# Patient Record
Sex: MALE | Race: WHITE | NOT HISPANIC OR LATINO
[De-identification: names, ages, dates, MRNs, and addresses within clinical notes are randomized per-mention and may not be internally consistent; named-entity substitution may affect disease eponyms.]

---

## 2018-02-21 ENCOUNTER — RESULT REVIEW (OUTPATIENT)
Age: 58
End: 2018-02-21

## 2018-10-16 ENCOUNTER — INPATIENT (INPATIENT)
Facility: HOSPITAL | Age: 58
LOS: 1 days | Discharge: ANOTHER HOSPITAL | End: 2018-10-18
Attending: INTERNAL MEDICINE | Admitting: INTERNAL MEDICINE
Payer: COMMERCIAL

## 2018-10-16 VITALS
HEART RATE: 114 BPM | RESPIRATION RATE: 16 BRPM | SYSTOLIC BLOOD PRESSURE: 153 MMHG | TEMPERATURE: 99 F | OXYGEN SATURATION: 100 % | DIASTOLIC BLOOD PRESSURE: 92 MMHG

## 2018-10-16 DIAGNOSIS — K42.9 UMBILICAL HERNIA WITHOUT OBSTRUCTION OR GANGRENE: Chronic | ICD-10-CM

## 2018-10-16 LAB
ALBUMIN SERPL ELPH-MCNC: 4.2 G/DL — SIGNIFICANT CHANGE UP (ref 3.3–5)
ALP SERPL-CCNC: 116 U/L — SIGNIFICANT CHANGE UP (ref 40–120)
ALT FLD-CCNC: 56 U/L — HIGH (ref 4–41)
APTT BLD: 33.4 SEC — SIGNIFICANT CHANGE UP (ref 27.5–37.4)
AST SERPL-CCNC: 39 U/L — SIGNIFICANT CHANGE UP (ref 4–40)
BASE EXCESS BLDV CALC-SCNC: -2.7 MMOL/L — SIGNIFICANT CHANGE UP
BASOPHILS # BLD AUTO: 0.03 K/UL — SIGNIFICANT CHANGE UP (ref 0–0.2)
BASOPHILS NFR BLD AUTO: 0.5 % — SIGNIFICANT CHANGE UP (ref 0–2)
BILIRUB SERPL-MCNC: 0.4 MG/DL — SIGNIFICANT CHANGE UP (ref 0.2–1.2)
BLOOD GAS VENOUS - CREATININE: 0.76 MG/DL — SIGNIFICANT CHANGE UP (ref 0.5–1.3)
BUN SERPL-MCNC: 19 MG/DL — SIGNIFICANT CHANGE UP (ref 7–23)
CALCIUM SERPL-MCNC: 9 MG/DL — SIGNIFICANT CHANGE UP (ref 8.4–10.5)
CHLORIDE BLDV-SCNC: 108 MMOL/L — SIGNIFICANT CHANGE UP (ref 96–108)
CHLORIDE SERPL-SCNC: 97 MMOL/L — LOW (ref 98–107)
CO2 SERPL-SCNC: 18 MMOL/L — LOW (ref 22–31)
CREAT SERPL-MCNC: 0.91 MG/DL — SIGNIFICANT CHANGE UP (ref 0.5–1.3)
EOSINOPHIL # BLD AUTO: 0.11 K/UL — SIGNIFICANT CHANGE UP (ref 0–0.5)
EOSINOPHIL NFR BLD AUTO: 1.9 % — SIGNIFICANT CHANGE UP (ref 0–6)
GAS PNL BLDV: 130 MMOL/L — LOW (ref 136–146)
GLUCOSE BLDV-MCNC: 312 — HIGH (ref 70–99)
GLUCOSE SERPL-MCNC: 329 MG/DL — HIGH (ref 70–99)
HBA1C BLD-MCNC: 9 % — HIGH (ref 4–5.6)
HCO3 BLDV-SCNC: 23 MMOL/L — SIGNIFICANT CHANGE UP (ref 20–27)
HCT VFR BLD CALC: 42.3 % — SIGNIFICANT CHANGE UP (ref 39–50)
HCT VFR BLDV CALC: 45.6 % — SIGNIFICANT CHANGE UP (ref 39–51)
HGB BLD-MCNC: 14.4 G/DL — SIGNIFICANT CHANGE UP (ref 13–17)
HGB BLDV-MCNC: 14.9 G/DL — SIGNIFICANT CHANGE UP (ref 13–17)
IMM GRANULOCYTES # BLD AUTO: 0.02 # — SIGNIFICANT CHANGE UP
IMM GRANULOCYTES NFR BLD AUTO: 0.3 % — SIGNIFICANT CHANGE UP (ref 0–1.5)
INR BLD: 0.95 — SIGNIFICANT CHANGE UP (ref 0.88–1.17)
LACTATE BLDV-MCNC: 1.8 MMOL/L — SIGNIFICANT CHANGE UP (ref 0.5–2)
LYMPHOCYTES # BLD AUTO: 1.18 K/UL — SIGNIFICANT CHANGE UP (ref 1–3.3)
LYMPHOCYTES # BLD AUTO: 20.5 % — SIGNIFICANT CHANGE UP (ref 13–44)
MCHC RBC-ENTMCNC: 30.4 PG — SIGNIFICANT CHANGE UP (ref 27–34)
MCHC RBC-ENTMCNC: 34 % — SIGNIFICANT CHANGE UP (ref 32–36)
MCV RBC AUTO: 89.2 FL — SIGNIFICANT CHANGE UP (ref 80–100)
MONOCYTES # BLD AUTO: 0.78 K/UL — SIGNIFICANT CHANGE UP (ref 0–0.9)
MONOCYTES NFR BLD AUTO: 13.6 % — SIGNIFICANT CHANGE UP (ref 2–14)
NEUTROPHILS # BLD AUTO: 3.63 K/UL — SIGNIFICANT CHANGE UP (ref 1.8–7.4)
NEUTROPHILS NFR BLD AUTO: 63.2 % — SIGNIFICANT CHANGE UP (ref 43–77)
NRBC # FLD: 0 — SIGNIFICANT CHANGE UP
PCO2 BLDV: 33 MMHG — LOW (ref 41–51)
PH BLDV: 7.42 PH — SIGNIFICANT CHANGE UP (ref 7.32–7.43)
PLATELET # BLD AUTO: 255 K/UL — SIGNIFICANT CHANGE UP (ref 150–400)
PMV BLD: 9.9 FL — SIGNIFICANT CHANGE UP (ref 7–13)
PO2 BLDV: 60 MMHG — HIGH (ref 35–40)
POTASSIUM BLDV-SCNC: 3.7 MMOL/L — SIGNIFICANT CHANGE UP (ref 3.4–4.5)
POTASSIUM SERPL-MCNC: 3.9 MMOL/L — SIGNIFICANT CHANGE UP (ref 3.5–5.3)
POTASSIUM SERPL-SCNC: 3.9 MMOL/L — SIGNIFICANT CHANGE UP (ref 3.5–5.3)
PROT SERPL-MCNC: 7.3 G/DL — SIGNIFICANT CHANGE UP (ref 6–8.3)
PROTHROM AB SERPL-ACNC: 10.9 SEC — SIGNIFICANT CHANGE UP (ref 9.8–13.1)
RBC # BLD: 4.74 M/UL — SIGNIFICANT CHANGE UP (ref 4.2–5.8)
RBC # FLD: 12.9 % — SIGNIFICANT CHANGE UP (ref 10.3–14.5)
SAO2 % BLDV: 91.6 % — HIGH (ref 60–85)
SODIUM SERPL-SCNC: 133 MMOL/L — LOW (ref 135–145)
TROPONIN T, HIGH SENSITIVITY: 11 NG/L — SIGNIFICANT CHANGE UP (ref ?–14)
TROPONIN T, HIGH SENSITIVITY: 17 NG/L — SIGNIFICANT CHANGE UP (ref ?–14)
WBC # BLD: 5.75 K/UL — SIGNIFICANT CHANGE UP (ref 3.8–10.5)
WBC # FLD AUTO: 5.75 K/UL — SIGNIFICANT CHANGE UP (ref 3.8–10.5)

## 2018-10-16 RX ORDER — ASPIRIN/CALCIUM CARB/MAGNESIUM 324 MG
162 TABLET ORAL DAILY
Qty: 0 | Refills: 0 | Status: DISCONTINUED | OUTPATIENT
Start: 2018-10-16 | End: 2018-10-16

## 2018-10-16 RX ORDER — LABETALOL HCL 100 MG
10 TABLET ORAL ONCE
Qty: 0 | Refills: 0 | Status: DISCONTINUED | OUTPATIENT
Start: 2018-10-16 | End: 2018-10-16

## 2018-10-16 RX ORDER — SODIUM CHLORIDE 9 MG/ML
1000 INJECTION, SOLUTION INTRAVENOUS ONCE
Qty: 0 | Refills: 0 | Status: COMPLETED | OUTPATIENT
Start: 2018-10-16 | End: 2018-10-16

## 2018-10-16 RX ORDER — DIAZEPAM 5 MG
5 TABLET ORAL ONCE
Qty: 0 | Refills: 0 | Status: DISCONTINUED | OUTPATIENT
Start: 2018-10-16 | End: 2018-10-16

## 2018-10-16 RX ORDER — ASPIRIN/CALCIUM CARB/MAGNESIUM 324 MG
162 TABLET ORAL ONCE
Qty: 0 | Refills: 0 | Status: COMPLETED | OUTPATIENT
Start: 2018-10-16 | End: 2018-10-16

## 2018-10-16 RX ORDER — ACETAMINOPHEN 500 MG
650 TABLET ORAL ONCE
Qty: 0 | Refills: 0 | Status: COMPLETED | OUTPATIENT
Start: 2018-10-16 | End: 2018-10-16

## 2018-10-16 RX ORDER — KETOROLAC TROMETHAMINE 30 MG/ML
15 SYRINGE (ML) INJECTION ONCE
Qty: 0 | Refills: 0 | Status: DISCONTINUED | OUTPATIENT
Start: 2018-10-16 | End: 2018-10-16

## 2018-10-16 RX ADMIN — Medication 162 MILLIGRAM(S): at 11:15

## 2018-10-16 RX ADMIN — Medication 650 MILLIGRAM(S): at 20:50

## 2018-10-16 RX ADMIN — Medication 650 MILLIGRAM(S): at 19:14

## 2018-10-16 RX ADMIN — Medication 5 MILLIGRAM(S): at 21:37

## 2018-10-16 RX ADMIN — SODIUM CHLORIDE 1000 MILLILITER(S): 9 INJECTION, SOLUTION INTRAVENOUS at 11:16

## 2018-10-16 RX ADMIN — Medication 15 MILLIGRAM(S): at 21:38

## 2018-10-16 RX ADMIN — Medication 15 MILLIGRAM(S): at 22:30

## 2018-10-16 RX ADMIN — SODIUM CHLORIDE 1000 MILLILITER(S): 9 INJECTION, SOLUTION INTRAVENOUS at 10:11

## 2018-10-16 NOTE — ED PROVIDER NOTE - OBJECTIVE STATEMENT
EM PGY1 Becky Banks MD: 59yo male with PMH of DM type 1 presents with chest pain since 5AM today. Pain is constant, sharp, in left upper chest radiating to left scapula. Pt has left arm numbness, weakness, feels a cold sensation in that arm. Never experienced this before. Took Tylenol at around 6AM. Pt has 15 pack year smoking history. Denies EtOH/drug use. Denies diaphoresis, SOB, N/V, fever, chills, abdominal pain.

## 2018-10-16 NOTE — ED ADULT NURSE NOTE - OBJECTIVE STATEMENT
pt received to room 8, ambulatory, c/o a sharp right sided chest pain that radiates to back and a feeling of weakness, numbness and tingling in left arm. pt states pain woke him from sleep around 5am.  pt has not improved since. pt took two tylenol with no relief.  pt states he cannot hold his coffee or drive as a result. pt denies SOB, nausea, abdominal pain. pt able to ambulate, no left sided weakness or decreased sensation noted to lower extremities. lower extremities equal in temperature. decreased sensation noted to left arm, no arm drift no slurred speech no facial droop. strength reduced in LUE. normal sinus on tele monitor, IV started, labs sent. vitals as noted. MD at bedside.

## 2018-10-16 NOTE — ED PROVIDER NOTE - ATTENDING CONTRIBUTION TO CARE
DR. ALVAREZ, ATTENDING MD-  I performed a face to face bedside interview with patient regarding history of present illness, review of symptoms and past medical history. I completed an independent physical exam.  I have discussed patient's plan of care with the resident.   Documentation as above in the note.   HPI: 57 yo M presents with chest pain, left sided, radiating to left arm with numbness and weakness left arm this morning upon waking. no slurring speech, no LE weakness. Chest pain radiating to back, no diaphoresis. Was driving to work then decided to come to ED.   EXAM: NAD,  MDM: Concern for ACS vs AAA/Dissection. Unlikely stroke/ICH. Will obtain CT head, labs, and CT chest/abd to r/o dissection but most likely needs admission for ECHO and stress given history and physical exam findings. DR. ALVAREZ, ATTENDING MD-  I performed a face to face bedside interview with patient regarding history of present illness, review of symptoms and past medical history. I completed an independent physical exam.  I have discussed patient's plan of care with the resident.   Documentation as above in the note.   HPI: 59 yo M presents with chest pain, left sided, radiating to left arm with numbness and weakness left arm this morning upon waking. no slurring speech, no LE weakness. Chest pain radiating to back, no diaphoresis. Was driving to work then decided to come to ED.   EXAM: NAD, Heart RRR; no M/R/G/JVD, Lungs CTAB bilaterally. Abd soft nontender. Pulses equal and palpable bilaterally. Warm and well perfused.   MDM: Concern for ACS vs AAA/Dissection. Unlikely stroke/ICH. Will obtain CT head, labs, and CT chest/abd to r/o dissection but most likely needs admission for ECHO and stress given history and physical exam findings.

## 2018-10-16 NOTE — CONSULT NOTE ADULT - ASSESSMENT
Mr. Lombardo is a 58y old Male with Hx of medicated DM2 who presents with acute LUE numbness and left shoulder pain since awakening this morning. This was initially associated with severe chest pain, which has since improved. Physical exam was notable for weakness of the left triceps and deltoid, and decreased sensation to light touch in the lower arm and hand. Labs showed hyperglycemia, CT head was negative, and EKG/serial troponins showed no sign of myocardial ischemia. Numbness and weakness is likely due to compressive vs. stretch neuropathy. Given improvement, additional imaging is not indicated at this time.     Recommend continuing aspirin and acetaminophen for pain control and repeat assessment for further improvement.  Pt can follow up with neurology as outpatient as needed if symptoms persist. Possible EMG/NCS as outpatient. Mr. Lombardo is a 58y old Male with Hx of medicated DM2 who presents with acute LUE numbness and left shoulder pain since awakening this morning. This was initially associated with severe chest pain, which has since improved. Physical exam was notable for weakness of the left triceps and deltoid, and decreased sensation to light touch in the lower arm and hand. Labs showed hyperglycemia, CT head was negative, and EKG/serial troponins showed no sign of myocardial ischemia. Numbness and weakness is likely due to compressive vs. stretch neuropathy. Given improvement, additional imaging is not indicated at this time.     Recommend continuing aspirin and acetaminophen for pain control and repeat assessment for further improvement.  Pt can follow up with neurology as outpatient as needed if symptoms persist. Possible EMG/NCS as outpatient. [address] [place] [address]. [phone].

## 2018-10-16 NOTE — ED ADULT NURSE REASSESSMENT NOTE - NS ED NURSE REASSESS COMMENT FT1
VSS.  Repeat EKG done as ordered.  Report given to receiving RN in CDU.  Pt to CDU in [facility].
SL x2 to right arm noted.  both ivs flushed and patent.  pts VSS.  s/p cta. awaits results.  resting in nad.

## 2018-10-16 NOTE — ED CDU PROVIDER INITIAL DAY NOTE - CHPI ED SYMPTOMS POS
Detail Level: Detailed Additional Notes (Optional): Discussed pathology numbness, weakness/CHEST PAIN

## 2018-10-16 NOTE — ED CDU PROVIDER INITIAL DAY NOTE - MEDICAL DECISION MAKING DETAILS
Pt is a 59 y/o M smoker PMHx DM, hernia repair p/w chest pain x 8 hrs.  - r/o ACS, possible radiculopathy -- telemetry, stress, neuro consult

## 2018-10-16 NOTE — ED ADULT NURSE NOTE - INTERVENTIONS DEFINITIONS
Provide visual cue, wrist band, yellow gown, etc./Monitor gait and stability/Non-slip footwear when patient is off stretcher/Stretcher in lowest position, wheels locked, appropriate side rails in place/Instruct patient to call for assistance/Physically safe environment: no spills, clutter or unnecessary equipment/Monitor for mental status changes and reorient to person, place, and time

## 2018-10-16 NOTE — CONSULT NOTE ADULT - SUBJECTIVE AND OBJECTIVE BOX
VINCENT LOMBARDOPatient is a 58y old  Male who presents with a chief complaint of     HPI:          MEDICATIONS  (STANDING):    MEDICATIONS  (PRN):    PAST MEDICAL & SURGICAL HISTORY:  Diabetes, type I  Paraumbilical hernia    FAMILY HISTORY:    Allergies    No Known Allergies    Intolerances        SHx - No smoking, No ETOH, No drug abuse      REVIEW OF SYSTEMS:    Constitutional: No fever, weight loss, or fatigue  Eyes: No eye pain, visual disturbances, or discharge  ENMT:  No difficulty hearing, tinnitus, vertigo; No sinus or throat pain  Neck: No pain or stiffness  Respiratory: No cough, wheezing, or shortness of breath  Cardiovascular: No chest pain, palpitations, or leg swelling  Gastrointestinal: No abdominal pain, nausea, vomiting, diarrhea or constipation.   Genitourinary: No dysuria, frequency, hematuria, or incontinence  Neurological: As per HPI  Skin: No itching, burning, rashes, or lesions   Endocrine: No heat or cold intolerance; No hair loss  Musculoskeletal: No joint pain or swelling; No muscle, back, or extremity pain  Psychiatric: No depression, anxiety, mood swings, or difficulty sleeping  Heme/Lymph: No easy bruising or bleeding; No enlarged glands      Vital Signs Last 24 Hrs  T(C): 36.6 (16 Oct 2018 12:07), Max: 37 (16 Oct 2018 06:19)  T(F): 97.9 (16 Oct 2018 12:07), Max: 98.6 (16 Oct 2018 06:19)  HR: 90 (16 Oct 2018 12:07) (90 - 114)  BP: 116/80 (16 Oct 2018 12:07) (116/80 - 157/101)  BP(mean): --  RR: 18 (16 Oct 2018 12:07) (16 - 18)  SpO2: 99% (16 Oct 2018 12:07) (99% - 100%)    General Exam:   General appearance: No acute distress    Cardiac:  Pulm:                 Neurological Exam:  Mental Status: Orientated to self, date and place.  Attention intact.  No dysarthria. Speech fluent.  Cranial Nerves:   PERRL, EOMI, VFF, no nystagmus.    CN V1-3 intact to light touch .  No facial asymmetry.  Hearing intact bilaterally.  Tongue  midline.  Sternocleidomastoid and Trapezius intact bilaterally.    Motor:   Tone: normal.                  Strength:     [] Upper extremity                      Delt       Bicep    Tricep                                                  R         5/5        5/5        5/5       5/5                                               L          5/5 5/5 5/5 5/5  [] Lower extremity                       HF          KE          KF        DF         PF                                               R        5/5 5/5 5/5 5/5 5/5                                               L         5/5 5/5 5/5 5/5 5/5             Dysmetria: None to finger-nose-finger or heel-shin-heel  No truncal ataxia.    Tremor: No resting, postural or action tremor.  No myoclonus.    Sensation: intact to light touch, pinprick, vibration and proprioception    Deep Tendon Reflexes:     Biceps          Triceps      BR        Patellar        Ankle         Babinski                                  R       2+                   2+           2+            2+               2+           downgoing                                  L        2+                  2+           2+            2+               2+           downgoing    Gait: normal.      Other:    10-16    133<L>  |  97<L>  |  19  ----------------------------<  329<H>  3.9   |  18<L>  |  0.91    Ca    9.0      16 Oct 2018 07:30    TPro  7.3  /  Alb  4.2  /  TBili  0.4  /  DBili  x   /  AST  39  /  ALT  56<H>  /  AlkPhos  116  10-16                            14.4   5.75  )-----------( 255      ( 16 Oct 2018 07:30 )             42.3       Radiology    CT:   MRI  EKG:  tele:  TTE:  EEG: SATHISH LOMBARDO    HPI: Patient is a 58y old Male with Hx of medicated DM2 who presents with a chief complaint of LUE numbness and left shoulder pain. He woke up this morning with sharp, 10/10 left-sided chest pain radiating to his back and associated with numbness and weakness. The shoulder pain has since improved to a 6/10 with aspirin, acetaminophen, and rest and is exacerbated by pulling himself to sitting from lying supine. He denies any recent traumas or heavy lifting and feels his pain is due to "trapping a nerve by sleeping on it funny." The numbness is mainly on the anterior side of the forearm into the first three digits, which is currently improving from this morning. He denies any headache, visual symptoms, difficulty walking.         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 No sinus or throat pain  Respiratory: 2 days of cough associated with rhinorrhea  Cardiovascular: chest pain. No palpitations, or leg swelling  Gastrointestinal: No abdominal pain, nausea, vomiting, diarrhea or constipation.   Genitourinary: No dysuria, frequency, hematuria, or incontinence  Neurological: As per HPI  Skin: No itching, burning, rashes, or lesions   Endocrine: No heat or cold intolerance; No hair loss  Musculoskeletal: No joint pain or swelling; No muscle, back, or extremity pain      Vital Signs Last 24 Hrs  T(C): 36.6 (16 Oct 2018 12:07), Max: 37 (16 Oct 2018 06:19)  T(F): 97.9 (16 Oct 2018 12:07), Max: 98.6 (16 Oct 2018 06:19)  HR: 90 (16 Oct 2018 12:07) (90 - 114)  BP: 116/80 (16 Oct 2018 12:07) (116/80 - 157/101)  BP(mean): --  RR: 18 (16 Oct 2018 12:07) (16 - 18)  SpO2: 99% (16 Oct 2018 12:07) (99% - 100%)    General Exam:   General appearance: No acute distress    Cardiac: Extremities well perfused, regular rate and rhythm  Pulm: Breathing nonlabored and even bilaterally  Neurological Exam:  Mental Status: Orientated to self, date and place.  Attention intact.  No dysarthria. Speech fluent.  Cranial Nerves:   PERRL, EOMI, VFF, no nystagmus.    CN V1-3 intact to light touch .  No facial asymmetry.  Hearing intact bilaterally.  Tongue and uvula midline.  Sternocleidomastoid intact bilaterally. Left trapezius motion limited by pain    Motor:   Tone: normal.                  Strength:     [] Upper extremity                      Delt       Bicep    Tricep              supraspinatus    wrist ext       wrist flex         finger flex                                          R         5/5        5/5        5/5       5/5                5/5                5/5               5/5                5/5                                           L          4+/5        5/5        4/5       5/5             4+/5              5/5               5/5                 5/5   [] Lower extremity                       HF          KE          KF        DF         PF                                               R        5/5        5/5        5/5       5/5       5/5                                               L         5/5        5/5       5/5       5/5        5/5             Dysmetria: None to finger-nose-finger or heel-shin-heel  No truncal ataxia.    Tremor: No resting, postural or action tremor.  No myoclonus.    Sensation: intact to light touch, vibration and proprioception. Sensation of light touch "blunted" on left medial forearm and medial palmar surface of left hand.    Deep Tendon Reflexes:     Biceps          Triceps      BR        Patellar        Ankle                                          R       2+                   2+           2+            2+               2+                                             L        1+                  1+           1+            2+               2+               Gait: normal.      Other:    10-16    133<L>  |  97<L>  |  19  ----------------------------<  329<H>  3.9   |  18<L>  |  0.91    Ca    9.0      16 Oct 2018 07:30    TPro  7.3  /  Alb  4.2  /  TBili  0.4  /  DBili  x   /  AST  39  /  ALT  56<H>  /  AlkPhos  116  10-16                            14.4   5.75  )-----------( 255      ( 16 Oct 2018 07:30 )             42.3       Radiology    CT: < from: CT Head No Cont (10.16.18 @ 12:35) >  IMPRESSION:    Normal non-contrast CT of the brain.

## 2018-10-16 NOTE — ED PROVIDER NOTE - PHYSICAL EXAMINATION
EM PGY1 Becky Banks MD:   CONSTITUTIONAL: Nontoxic, well nourished, well developed, middle-aged male, resting comfortably in no acute distress  HEAD: Normocephalic; atraumatic  EYES: Normal inspection, EOMI  ENMT: External appears normal; normal oropharynx  NECK: Supple; non-tender; no cervical lymphadenopathy  CARD: RRR; no audible murmurs, rubs, or gallops  RESP: No respiratory distress, lungs ctab/l  ABD: Soft, non-distended; non-tender; no rebound or guarding, well healed scar from periumbilical hernia    EXT: No LE pitting edema or calf tenderness; distal pulses intact with good capillary refill  SKIN: Warm, dry, intact  NEURO: aaox3, 4.5/5 strength left UE, 5/5 strength right UE and b/l LE, sensation decreased on radial aspect of left arm and first two digits on left

## 2018-10-16 NOTE — ED PROVIDER NOTE - MEDICAL DECISION MAKING DETAILS
EM PGY1 Becky Banks MD: 59yo male with PMH of DM type 1 presents with chest pain since 5AM today. Pt is hypertensive, tachycardic in ED. Left sided chest pain with neurological deficit in left arm. No pulse deficits. ACS vs aortic dissection vs radiculopathy. Plan: labs, troponin, CTA chest/abdomen, EKG, CXR.

## 2018-10-16 NOTE — ED CDU PROVIDER INITIAL DAY NOTE - OBJECTIVE STATEMENT
Pt is a 59 y/o M smoker PMHx DM, hernia repair p/w chest pain x 8 hrs.  Pt notes after waking up 8 hrs, pt tried to get up out of bed and noticed left upper chest pain radiating to left upper back and shoulder described as moderate to severe aching pain.  Pt notes pain radiated down left arm.  Pt also noted left arm numbness, tingling and weakness, stating that, he felt it was difficult to get a good .  Pt states chest pain and back pain worsens with movement and lifting with arm.  Pt states over time, pain has improved and weakness has nearly entirely resolved.  Pt still notes tingling to left arm.  Pt denies any fevers, chills, nausea, vomiting, headache, neck pain, trauma, falls, calf pain/swelling, h/o dvt/pe in past, hemoptysis, recent prolonged immobilization, recent surgeries, illicit drug use.  Pt's father had stents placed when father was in his 70s.

## 2018-10-16 NOTE — ED PROVIDER NOTE - CARE PLAN
Principal Discharge DX:	Numbness and tingling in left arm  Secondary Diagnosis:	Chest pain, unspecified type

## 2018-10-16 NOTE — ED CDU PROVIDER INITIAL DAY NOTE - NONTENDER LOCATION
left upper quadrant/right upper quadrant/right lower quadrant/left lower quadrant/umbilical/right costovertebral angle/periumbilical/suprapubic/left costovertebral angle

## 2018-10-16 NOTE — ED CDU PROVIDER INITIAL DAY NOTE - ATTENDING CONTRIBUTION TO CARE
Dr. Acosta: I performed a face to face bedside interview with patient regarding history of present illness, review of symptoms and past medical history. I completed an independent physical exam.  I have discussed patient's plan of care with PA.   I agree with note as stated above, having amended the EMR as needed to reflect my findings.   This includes HISTORY OF PRESENT ILLNESS, HIV, PAST MEDICAL/SURGICAL/FAMILY/SOCIAL HISTORY, ALLERGIES AND HOME MEDICATIONS, REVIEW OF SYSTEMS, PHYSICAL EXAM, and any PROGRESS NOTES during the time I functioned as the attending physician for this patient. 58M h/o dm, active smoker c/o cp x hours. Today pt woke up noted cp, left side chest, radiating over shoulder into left upper back and down lue. CP lasted hours, has since resolved, LUE tingling persists. Denies exertional sx. PE nad, aaox3, ctabl, rrr, s1s2, abd soft ntnd, neg le edema DDX atypical chest pain PLAN trop #2, tele monitor, stress.

## 2018-10-16 NOTE — ED PROVIDER NOTE - NS ED ROS FT
EM PGY1 Becky Banks MD:   General: denies fever, chills  HENT: denies nasal congestion, sore throat, rhinorrhea  Eyes: denies vision changes  CV: +chest pain  Resp: denies difficulty breathing, cough  Abdominal: denies nausea, vomiting, diarrhea, abdominal pain, blood in stool, dark stool  : denies pain with urination  MSK: denies recent trauma  Neuro: denies headaches, tingling, dizziness, lightheadedness, +numbness  Skin: denies new rashes

## 2018-10-16 NOTE — ED ADULT TRIAGE NOTE - CHIEF COMPLAINT QUOTE
Pt arrives w/ c/o left arm pain w/ chest discomfort radiating to shoulder pain since 0530. Pt rpts left hand pins and needles sensation. Denies SOB, nausea or vomiting.

## 2018-10-17 ENCOUNTER — TRANSCRIPTION ENCOUNTER (OUTPATIENT)
Age: 58
End: 2018-10-17

## 2018-10-17 VITALS
SYSTOLIC BLOOD PRESSURE: 146 MMHG | DIASTOLIC BLOOD PRESSURE: 95 MMHG | HEART RATE: 74 BPM | TEMPERATURE: 98 F | RESPIRATION RATE: 16 BRPM | OXYGEN SATURATION: 98 %

## 2018-10-17 DIAGNOSIS — E78.5 HYPERLIPIDEMIA, UNSPECIFIED: ICD-10-CM

## 2018-10-17 DIAGNOSIS — R07.9 CHEST PAIN, UNSPECIFIED: ICD-10-CM

## 2018-10-17 DIAGNOSIS — R94.39 ABNORMAL RESULT OF OTHER CARDIOVASCULAR FUNCTION STUDY: ICD-10-CM

## 2018-10-17 DIAGNOSIS — R20.0 ANESTHESIA OF SKIN: ICD-10-CM

## 2018-10-17 DIAGNOSIS — E11.9 TYPE 2 DIABETES MELLITUS WITHOUT COMPLICATIONS: ICD-10-CM

## 2018-10-17 LAB
BUN SERPL-MCNC: 17 MG/DL — SIGNIFICANT CHANGE UP (ref 7–23)
CALCIUM SERPL-MCNC: 8.9 MG/DL — SIGNIFICANT CHANGE UP (ref 8.4–10.5)
CHLORIDE SERPL-SCNC: 98 MMOL/L — SIGNIFICANT CHANGE UP (ref 98–107)
CO2 SERPL-SCNC: 20 MMOL/L — LOW (ref 22–31)
CREAT SERPL-MCNC: 1.06 MG/DL — SIGNIFICANT CHANGE UP (ref 0.5–1.3)
GLUCOSE BLDC GLUCOMTR-MCNC: 189 MG/DL — HIGH (ref 70–99)
GLUCOSE SERPL-MCNC: 223 MG/DL — HIGH (ref 70–99)
HCT VFR BLD CALC: 43.1 % — SIGNIFICANT CHANGE UP (ref 39–50)
HGB BLD-MCNC: 14.5 G/DL — SIGNIFICANT CHANGE UP (ref 13–17)
MCHC RBC-ENTMCNC: 29.7 PG — SIGNIFICANT CHANGE UP (ref 27–34)
MCHC RBC-ENTMCNC: 33.6 % — SIGNIFICANT CHANGE UP (ref 32–36)
MCV RBC AUTO: 88.1 FL — SIGNIFICANT CHANGE UP (ref 80–100)
NRBC # FLD: 0 — SIGNIFICANT CHANGE UP
PLATELET # BLD AUTO: 252 K/UL — SIGNIFICANT CHANGE UP (ref 150–400)
PMV BLD: 10.1 FL — SIGNIFICANT CHANGE UP (ref 7–13)
POTASSIUM SERPL-MCNC: 3.7 MMOL/L — SIGNIFICANT CHANGE UP (ref 3.5–5.3)
POTASSIUM SERPL-SCNC: 3.7 MMOL/L — SIGNIFICANT CHANGE UP (ref 3.5–5.3)
RBC # BLD: 4.89 M/UL — SIGNIFICANT CHANGE UP (ref 4.2–5.8)
RBC # FLD: 12.7 % — SIGNIFICANT CHANGE UP (ref 10.3–14.5)
SODIUM SERPL-SCNC: 136 MMOL/L — SIGNIFICANT CHANGE UP (ref 135–145)
WBC # BLD: 5.56 K/UL — SIGNIFICANT CHANGE UP (ref 3.8–10.5)
WBC # FLD AUTO: 5.56 K/UL — SIGNIFICANT CHANGE UP (ref 3.8–10.5)

## 2018-10-17 PROCEDURE — 78452 HT MUSCLE IMAGE SPECT MULT: CPT | Mod: 26

## 2018-10-17 PROCEDURE — 93018 CV STRESS TEST I&R ONLY: CPT | Mod: GC

## 2018-10-17 PROCEDURE — 93016 CV STRESS TEST SUPVJ ONLY: CPT | Mod: GC

## 2018-10-17 PROCEDURE — 93010 ELECTROCARDIOGRAM REPORT: CPT

## 2018-10-17 RX ORDER — OXYCODONE AND ACETAMINOPHEN 5; 325 MG/1; MG/1
1 TABLET ORAL ONCE
Qty: 0 | Refills: 0 | Status: DISCONTINUED | OUTPATIENT
Start: 2018-10-17 | End: 2018-10-17

## 2018-10-17 RX ORDER — DEXTROSE 50 % IN WATER 50 %
12.5 SYRINGE (ML) INTRAVENOUS ONCE
Qty: 0 | Refills: 0 | Status: DISCONTINUED | OUTPATIENT
Start: 2018-10-17 | End: 2018-10-18

## 2018-10-17 RX ORDER — SODIUM CHLORIDE 9 MG/ML
500 INJECTION INTRAMUSCULAR; INTRAVENOUS; SUBCUTANEOUS
Qty: 0 | Refills: 0 | Status: DISCONTINUED | OUTPATIENT
Start: 2018-10-17 | End: 2018-10-18

## 2018-10-17 RX ORDER — INSULIN LISPRO 100/ML
VIAL (ML) SUBCUTANEOUS
Qty: 0 | Refills: 0 | Status: DISCONTINUED | OUTPATIENT
Start: 2018-10-17 | End: 2018-10-18

## 2018-10-17 RX ORDER — GLUCAGON INJECTION, SOLUTION 0.5 MG/.1ML
1 INJECTION, SOLUTION SUBCUTANEOUS ONCE
Qty: 0 | Refills: 0 | Status: DISCONTINUED | OUTPATIENT
Start: 2018-10-17 | End: 2018-10-18

## 2018-10-17 RX ORDER — SODIUM CHLORIDE 9 MG/ML
1000 INJECTION, SOLUTION INTRAVENOUS
Qty: 0 | Refills: 0 | Status: DISCONTINUED | OUTPATIENT
Start: 2018-10-17 | End: 2018-10-18

## 2018-10-17 RX ORDER — ASPIRIN/CALCIUM CARB/MAGNESIUM 324 MG
81 TABLET ORAL DAILY
Qty: 0 | Refills: 0 | Status: DISCONTINUED | OUTPATIENT
Start: 2018-10-18 | End: 2018-10-18

## 2018-10-17 RX ORDER — DEXTROSE 50 % IN WATER 50 %
25 SYRINGE (ML) INTRAVENOUS ONCE
Qty: 0 | Refills: 0 | Status: DISCONTINUED | OUTPATIENT
Start: 2018-10-17 | End: 2018-10-18

## 2018-10-17 RX ORDER — DEXTROSE 50 % IN WATER 50 %
15 SYRINGE (ML) INTRAVENOUS ONCE
Qty: 0 | Refills: 0 | Status: DISCONTINUED | OUTPATIENT
Start: 2018-10-17 | End: 2018-10-18

## 2018-10-17 RX ORDER — ATORVASTATIN CALCIUM 80 MG/1
40 TABLET, FILM COATED ORAL AT BEDTIME
Qty: 0 | Refills: 0 | Status: DISCONTINUED | OUTPATIENT
Start: 2018-10-17 | End: 2018-10-18

## 2018-10-17 RX ORDER — INFLUENZA VIRUS VACCINE 15; 15; 15; 15 UG/.5ML; UG/.5ML; UG/.5ML; UG/.5ML
0.5 SUSPENSION INTRAMUSCULAR ONCE
Qty: 0 | Refills: 0 | Status: DISCONTINUED | OUTPATIENT
Start: 2018-10-17 | End: 2018-10-18

## 2018-10-17 RX ORDER — ASPIRIN/CALCIUM CARB/MAGNESIUM 324 MG
324 TABLET ORAL ONCE
Qty: 0 | Refills: 0 | Status: DISCONTINUED | OUTPATIENT
Start: 2018-10-17 | End: 2018-10-18

## 2018-10-17 RX ORDER — METOPROLOL TARTRATE 50 MG
12.5 TABLET ORAL
Qty: 0 | Refills: 0 | COMMUNITY
Start: 2018-10-17

## 2018-10-17 RX ORDER — METOPROLOL TARTRATE 50 MG
12.5 TABLET ORAL
Qty: 0 | Refills: 0 | Status: DISCONTINUED | OUTPATIENT
Start: 2018-10-17 | End: 2018-10-18

## 2018-10-17 RX ORDER — ATORVASTATIN CALCIUM 80 MG/1
1 TABLET, FILM COATED ORAL
Qty: 0 | Refills: 0 | COMMUNITY
Start: 2018-10-17

## 2018-10-17 RX ORDER — ACETAMINOPHEN 500 MG
650 TABLET ORAL ONCE
Qty: 0 | Refills: 0 | Status: COMPLETED | OUTPATIENT
Start: 2018-10-17 | End: 2018-10-17

## 2018-10-17 RX ADMIN — SODIUM CHLORIDE 75 MILLILITER(S): 9 INJECTION INTRAMUSCULAR; INTRAVENOUS; SUBCUTANEOUS at 18:30

## 2018-10-17 RX ADMIN — Medication 1: at 22:00

## 2018-10-17 RX ADMIN — OXYCODONE AND ACETAMINOPHEN 1 TABLET(S): 5; 325 TABLET ORAL at 18:30

## 2018-10-17 RX ADMIN — Medication 650 MILLIGRAM(S): at 07:11

## 2018-10-17 RX ADMIN — Medication 650 MILLIGRAM(S): at 07:45

## 2018-10-17 RX ADMIN — ATORVASTATIN CALCIUM 40 MILLIGRAM(S): 80 TABLET, FILM COATED ORAL at 21:39

## 2018-10-17 RX ADMIN — Medication 12.5 MILLIGRAM(S): at 22:38

## 2018-10-17 RX ADMIN — Medication 100 MILLIGRAM(S): at 07:10

## 2018-10-17 NOTE — ED CDU PROVIDER DISPOSITION NOTE - ATTENDING CONTRIBUTION TO CARE
Attending Statement: I have reviewed and agree with all pertinent clinical information, including history and physical exam and agree with treatment plan of the PA, except as noted.

## 2018-10-17 NOTE — H&P CARDIOLOGY - HISTORY OF PRESENT ILLNESS
58 year old male current smoker with family history of CAD with DM-II who presented to [facility] ED 10/17 complaining of chest pain/pressure with associated left arm numbness/weakness.  Denies dizziness, syncope, edema, orthopnea and PND.  CT head was performed showing no CVA and neurology was consulted who thought his symptoms were secondary to neuropathy.  Underwent a Cardiac work up, R/O MI, underwent Nuclear stress test revealing EF 52%, medium sized, moderate defects in inferior and inferoseptal walls that are reversible, suggestive of ischemia.   In light of patients cardiac risk factors, symptoms and abnormal noninvasive test findings there is high suspicion for CAD. Patient is now referred to [facility] for a cardiac catheterization with possible PTCA/stent.

## 2018-10-17 NOTE — CONSULT NOTE ADULT - ATTENDING COMMENTS
EP ATTENDING    Patient seen and examined. Agree with above. Called for palpitations and tachycardia (sinus) in setting of chest pain and abnormal NST implying RCA ischemia. Rhythms on tele/ekg are sinus tachycardia, and gated SPECT LVEF is normal. Recommend cath, and then outpatient event monitoring if inpatient tele remains sinus. Over 15 minutes of tobacco cessation counseling performed.
Patient seen and examined with neurology team and above note reviewed and I agree with assessment and plan as outlined.   Patient presented with left arm and shoulder pain with associated numbness and tingling and weakness in his right arm. He recent trauma, fall or injury and he does have weakness, albeit, improving since yesterday in the left deltoid and triceps and supraspinatus.  Reflexes present and he has decreased sensation in the left arm compared to right side.  CT Head reviewed and was negative for any acute infarct.     DDX: brachial plexus stretch injury vs rotator cuff injury with nerve impingement. Would recommend PT and OT and complete cardiac workup and continue pain control as needed with NSAIDS and if no improvement in the next 2-3 weeks will perform outpatient EMG at [phone].  All questions answered and he understood the plan.

## 2018-10-17 NOTE — DISCHARGE NOTE ADULT - MEDICATION SUMMARY - MEDICATIONS TO TAKE
I will START or STAY ON the medications listed below when I get home from the hospital:    Aspirin Enteric Coated 81 mg oral delayed release tablet  -- 1 tab(s) by mouth once a day  -- Indication: For CAD    glimepiride 2 mg oral tablet  -- 1 tab(s) by mouth once a day  -- Indication: For Diabetes mellitus type 2 in nonobese    atorvastatin 40 mg oral tablet  -- 1 tab(s) by mouth once a day (at bedtime)  -- Indication: For CAD    metoprolol  -- 12.5 milligram(s) by mouth 2 times a day  -- Indication: For CAD

## 2018-10-17 NOTE — DISCHARGE NOTE ADULT - CARE PROVIDER_API CALL
Rufus Baldwin), Surgery; Surgical Critical Care; Thoracic and Cardiac Surgery  [address] [place] [id number]  Phone: [phone]  Fax: [phone]

## 2018-10-17 NOTE — DISCHARGE NOTE ADULT - HOSPITAL COURSE
59 y/o M smoker PMHx DM, hernia repair p/w chest pain x 8 hrs.  Pt notes after waking up 8 hrs, pt tried to get up out of bed and noticed left upper chest pain radiating to left upper back and shoulder described as moderate to severe aching pain. Patient was initially stayed in CDU for stress test which showed * Myocardial Perfusion SPECT results are abnormal at 90 % of MPHR. * There are medium sized, moderate defects in inferior and inferoseptal walls that are reversible, suggestive of  ischemia. * Post-stress gated wall motion analysis was performed (LVEF = 52 %;LVEDV = 62 ml.), revealing mild hypokinesis of the inferior wall. RV function appeared normal.  RV function appeared normal. *** No previous Nuclear/Stress exam. Patient was admitted for abnormal stress test and underwent cardiac cath which showed: pLAD 70 with +IFR 0.89, OM1 90, m/dRCA 70; RFA access . Sheath removed by 21:30. RFA site appears stable. Heparin gtt to be started 6 hours s/p sheath removal. Patient to be transferred [facility] for CABG eval with Dr. Baldwin. Discussed with Dr. Hoffman 57 y/o M smoker PMHx DM, hernia repair p/w chest pain x 8 hrs.  Pt notes after waking up 8 hrs, pt tried to get up out of bed and noticed left upper chest pain radiating to left upper back and shoulder described as moderate to severe aching pain.     Patient was consulted by neurology for left arm and shoulder pain with associated numbness and tingling and weakness in his right arm. CT Head negative for any acute infarct.  Per neuro: DDX: brachial plexus stretch injury vs rotator cuff injury with nerve impingement. Would recommend PT and OT and complete cardiac workup and continue pain control as needed with NSAIDS and if no improvement in the next 2-3 weeks will perform outpatient EMG at [phone].    EP was consulted for occasional palpitations.  Recommended Outpatient event monitor.     Patient was initially stayed in CDU for stress test which showed * Myocardial Perfusion SPECT results are abnormal at 90 % of MPHR. * There are medium sized, moderate defects in inferior and inferoseptal walls that are reversible, suggestive of ischemia. * Post-stress gated wall motion analysis was performed (LVEF = 52 %;LVEDV = 62 ml.), revealing mild hypokinesis of the inferior wall. RV function appeared normal.  RV function appeared normal. *** No previous Nuclear/Stress exam. Patient was admitted for abnormal stress test and underwent cardiac cath which showed: pLAD 70 with +IFR 0.89, OM1 90, m/dRCA 70; RFA access . Sheath removed by 21:30. RFA site appears stable. No signs of bleeding or hematoma. Good pedal pulse.  It was recommended that Heparin gtt to be started 6 hours s/p sheath removal. Patient to be transferred [facility] for CABG eval with Dr. Baldwin. Discussed with Dr. Hoffman

## 2018-10-17 NOTE — ED CDU PROVIDER DISPOSITION NOTE - CLINICAL COURSE
59yo M hx of DM, smoker, presented chest pain and sob. States that he had "really bad" left sided chest pain radiated to the left arm, with "tingling and numbness of the left arm" no sob/daniels no orthopnea. no productive cough. no fever or chills. no nausea/vomit no abdominal pain. no leg swelling or calf pain. no travel. Now denies chest pain. Endorsing left arm/shoulder sharp pain.   Vital signs noted. pt sitting up eating, nontoxic. EOMI. mmm. normal S1-S2 No resp distress. able to speak in full and clear sentences. no wheeze, rales or stridor. AO3  plan tele monitoring no events. trop wnl. stress: moderate defect in inferior and inferoseptal wall that are reversible suggestive of ischemia.   cardiology consulted. pt admitted.

## 2018-10-17 NOTE — ED CDU PROVIDER SUBSEQUENT DAY NOTE - MEDICAL DECISION MAKING DETAILS
A:  -59yo M smoker w/ DM c/o chest pain, consider radicular component  P:  -Stress test, Valium 5mgs/Toradol 15mgs IVP

## 2018-10-17 NOTE — CONSULT NOTE ADULT - SUBJECTIVE AND OBJECTIVE BOX
Patient is a 58y old  Male who presents with a chief complaint of chest pain .      HPI:  58 year old male current smoker with family history of CAD with DM-II who presented to [facility] ED 10/17 complaining of chest pain/pressure with associated left arm numbness/weakness.  Denies dizziness, syncope, edema, orthopnea and PND.  CT head was performed showing no CVA and neurology was consulted who thought his symptoms were secondary to neuropathy.  Underwent a Cardiac work up, R/O MI, underwent Nuclear stress test revealing EF 52%, medium sized, moderate defects in inferior and inferoseptal walls that are reversible, suggestive of ischemia.   In light of patients cardiac risk factors, symptoms and abnormal noninvasive test findings there is high suspicion for CAD. Still have left shoulder pain with numbness left hand thumb and index finger.       PAST MEDICAL & SURGICAL HISTORY:  Umbilical hernia  Diabetes mellitus type 2 in nonobese  Diabetes, type I  Paraumbilical hernia      Social History:    FAMILY HISTORY:  Family history of coronary artery disease (Father, Mother)      Allergies    No Known Allergies    Intolerances        REVIEW OF SYSTEMS:    CONSTITUTIONAL: No fever, weight loss, or fatigue  EYES: No eye pain, visual disturbances, or discharge  RESPIRATORY: No cough, wheezing, chills or hemoptysis; No shortness of breath  CARDIOVASCULAR: No chest pain, palpitations, dizziness, or leg swelling  GASTROINTESTINAL: No abdominal or epigastric pain. No nausea, vomiting, or hematemesis; No diarrhea or constipation. No melena or hematochezia.  GENITOURINARY: No dysuria, frequency, hematuria, or incontinence  NEUROLOGICAL: No headaches, memory loss, loss of strength, numbness, or tremors  SKIN: No itching, burning, rashes, or lesions   ENDOCRINE: No heat or cold intolerance; No hair loss  MUSCULOSKELETAL: No joint pain or swelling; No muscle, back, or extremity pain  PSYCHIATRIC: No depression, anxiety, mood swings, or difficulty sleeping      MEDICATIONS  (STANDING):  aspirin  chewable 324 milliGRAM(s) Oral once    MEDICATIONS  (PRN):      Vital Signs Last 24 Hrs  T(C): 37 (17 Oct 2018 13:55), Max: 37 (17 Oct 2018 13:55)  T(F): 98.6 (17 Oct 2018 13:55), Max: 98.6 (17 Oct 2018 13:55)  HR: 85 (17 Oct 2018 13:55) (85 - 94)  BP: 134/94 (17 Oct 2018 13:55) (122/79 - 136/88)  BP(mean): --  RR: 16 (17 Oct 2018 13:55) (16 - 20)  SpO2: 97% (17 Oct 2018 13:55) (97% - 100%)    PHYSICAL EXAM:    GENERAL: NAD, well-groomed, well-developed  HEAD:  Atraumatic, Normocephalic  EYES: EOMI, PERRLA, conjunctiva and sclera clear  ENMT: No tonsillar erythema, exudates, or enlargement; Moist mucous membranes, Good dentition, No lesions  NECK: Supple, No JVD, Normal thyroid  NERVOUS SYSTEM:  Alert & Oriented X3, Good concentration; Motor Strength 5/5 B/L upper and lower extremities; DTRs 2+ intact and symmetric  CHEST/LUNG: Clear to percussion bilaterally; No rales, rhonchi, wheezing, or rubs  HEART: Regular rate and rhythm; No murmurs, rubs, or gallops  ABDOMEN: Soft, Nontender, Nondistended; Bowel sounds present  EXTREMITIES:  2+ Peripheral Pulses, No clubbing, cyanosis, or edema  LYMPH: No lymphadenopathy noted  SKIN: No rashes or lesions    LABS:                        14.4   5.75  )-----------( 255      ( 16 Oct 2018 07:30 )             42.3     10-16    133<L>  |  97<L>  |  19  ----------------------------<  329<H>  3.9   |  18<L>  |  0.91    Ca    9.0      16 Oct 2018 07:30    TPro  7.3  /  Alb  4.2  /  TBili  0.4  /  DBili  x   /  AST  39  /  ALT  56<H>  /  AlkPhos  116  10-16    PT/INR - ( 16 Oct 2018 07:30 )   PT: 10.9 SEC;   INR: 0.95          PTT - ( 16 Oct 2018 07:30 )  PTT:33.4 SEC        RADIOLOGY & ADDITIONAL STUDIES: Patient is a 58y old  Male who presents with a chief complaint of chest pain .      HPI:  58 year old male current smoker with family history of CAD with DM-II who presented to [facility] ED 10/17 complaining of chest pain/pressure with associated left arm numbness/weakness.  Denies dizziness, syncope, edema, orthopnea and PND.  CT head was performed showing no CVA and neurology was consulted who thought his symptoms were secondary to neuropathy.  Underwent a Cardiac work up, R/O MI, underwent Nuclear stress test revealing EF 52%, medium sized, moderate defects in inferior and inferoseptal walls that are reversible, suggestive of ischemia.   In light of patients cardiac risk factors, symptoms and abnormal noninvasive test findings there is high suspicion for CAD. Still have left shoulder pain with numbness left hand thumb and index finger.       PAST MEDICAL & SURGICAL HISTORY:  Umbilical hernia  Diabetes mellitus type 2 in nonobese  Diabetes, type I  Paraumbilical hernia      Social History:    FAMILY HISTORY:  Family history of coronary artery disease (Father, Mother)      Allergies    No Known Allergies    Intolerances        REVIEW OF SYSTEMS:    CONSTITUTIONAL: No fever, weight loss, or fatigue  EYES: No eye pain, visual disturbances, or discharge  RESPIRATORY: No cough, wheezing, chills or hemoptysis; No shortness of breath  CARDIOVASCULAR:  chest pain, but no palpitations, dizziness, or leg swelling  GASTROINTESTINAL: No abdominal or epigastric pain. No nausea, vomiting, or hematemesis; No diarrhea or constipation. No melena or hematochezia.  GENITOURINARY: No dysuria, frequency, hematuria, or incontinence  NEUROLOGICAL: No headaches, memory loss, loss of strength, but numbness left hand fingers.   SKIN: No itching, burning, rashes, or lesions   ENDOCRINE: No heat or cold intolerance; No hair loss      MEDICATIONS  (STANDING):  aspirin  chewable 324 milliGRAM(s) Oral once    MEDICATIONS  (PRN):      Vital Signs Last 24 Hrs  T(C): 37 (17 Oct 2018 13:55), Max: 37 (17 Oct 2018 13:55)  T(F): 98.6 (17 Oct 2018 13:55), Max: 98.6 (17 Oct 2018 13:55)  HR: 85 (17 Oct 2018 13:55) (85 - 94)  BP: 134/94 (17 Oct 2018 13:55) (122/79 - 136/88)  BP(mean): --  RR: 16 (17 Oct 2018 13:55) (16 - 20)  SpO2: 97% (17 Oct 2018 13:55) (97% - 100%)    PHYSICAL EXAM:    GENERAL: NAD, well-groomed, well-developed  HEAD:  Atraumatic, Normocephalic  EYES: EOMI, PERRLA, conjunctiva and sclera clear  ENMT: No tonsillar erythema, exudates, or enlargement; Moist mucous membranes, Good dentition, No lesions  NECK: Supple, No JVD, Normal thyroid  NERVOUS SYSTEM:  Alert & Oriented X3, No focal sign   CHEST/LUNG: Clear to percussion bilaterally; No rales, rhonchi, wheezing, or rubs  HEART: Regular rate and rhythm; No murmurs, rubs, or gallops  ABDOMEN: Soft, Nontender, Nondistended; Bowel sounds present  EXTREMITIES:  2+ Peripheral Pulses, No clubbing, cyanosis, or edema    LABS:                        14.4   5.75  )-----------( 255      ( 16 Oct 2018 07:30 )             42.3     10-16    133<L>  |  97<L>  |  19  ----------------------------<  329<H>  3.9   |  18<L>  |  0.91    Ca    9.0      16 Oct 2018 07:30    TPro  7.3  /  Alb  4.2  /  TBili  0.4  /  DBili  x   /  AST  39  /  ALT  56<H>  /  AlkPhos  116  10-16    PT/INR - ( 16 Oct 2018 07:30 )   PT: 10.9 SEC;   INR: 0.95          PTT - ( 16 Oct 2018 07:30 )  PTT:33.4 SEC        RADIOLOGY & ADDITIONAL STUDIES:

## 2018-10-17 NOTE — ED CDU PROVIDER SUBSEQUENT DAY NOTE - MUSCULOSKELETAL, MLM
Spine appears normal, range of motion is not limited, no muscle or joint tenderness, +chest wall tenderness L upper outer quadrant

## 2018-10-17 NOTE — ED CDU PROVIDER SUBSEQUENT DAY NOTE - PROGRESS NOTE DETAILS
JACOB Alexander: Pt resting comfortably in bed NAD, denies chest pain/sob.  Pt had stress test this AM.  Awaiting results.  Will continue to monitor. JACOB Alexander: received call from cardiology Dr Ferrer pt with +stress test.  Pt resting comfortably in bed, NAD, denies chest pain.  Pt advised of results.  Pt states he wants to talk to Cardiologist before admission. JACOB Alexander: pt seen at bedside by Dr Hoffman, pt agreeable to admission to cardiology.  Tele PA notified.

## 2018-10-17 NOTE — ED CDU PROVIDER SUBSEQUENT DAY NOTE - HISTORY
CDU Initial Day Note: Pt is a 57 y/o M smoker PMHx DM, hernia repair p/w chest pain x 8 hrs.  Pt notes after waking up 8 hrs, pt tried to get up out of bed and noticed left upper chest pain radiating to left upper back and shoulder described as moderate to severe aching pain.  Pt notes pain radiated down left arm.  Pt also noted left arm numbness, tingling and weakness, stating that, he felt it was difficult to get a good .  Pt states chest pain and back pain worsens with movement and lifting with arm.  Pt states over time, pain has improved and weakness has nearly entirely resolved.  Pt still notes tingling to left arm.  Pt denies any fevers, chills, nausea, vomiting, headache, neck pain, trauma, falls, calf pain/swelling, h/o dvt/pe in past, hemoptysis, recent prolonged immobilization, recent surgeries, illicit drug use.  Pt's father had stents placed when father was in his 70s.  CDU Subsequent Day Note: Pt resting in bed comfortably in NAD, VSS, pending Stress test in AM.

## 2018-10-17 NOTE — DISCHARGE NOTE ADULT - CARE PLAN
Principal Discharge DX:	CAD (coronary artery disease)  Goal:	You are being transferred [facility] for CABG Eval.  Assessment and plan of treatment:	You are being transferred to [facility] for CABG eval. Avoid heavy lifting. No strenuous activity x 3 weeks. No heavy lifting > 5-10 lbs x one week.  Monitor site of procedure for bleeding, pain, swelling, discharge. Notify MD if symptoms occur. You may shower,  no baths/swimming x one week. Heparin gtt to be started 6 hours post sheath pull. sheath pulled approximately at 21:30 on 10/17  Secondary Diagnosis:	Diabetes mellitus type 2 in nonobese  Goal:	To maintain a normal blood glucose level and HgA1C level < 5.7 and to prevent diabetic complications such as uncontrolled diabetes, diabetic coma, blindness, renal failure, and amputations.  Assessment and plan of treatment:	Monitor finger sticks pre-meal and bedtime, low salt, fat and carbohydrate diet, minimize glucose intake.  Exercise daily for at least 30 minutes and weight loss.  Follow up with primary care physician and endocrinologist for routine Hemoglobin A1C checks and management.  Follow up with your ophthalmologist for routine yearly vision exams.  Secondary Diagnosis:	HLD (hyperlipidemia)  Goal:	To maintain normal cholesterol levels to prevent stroke, coronary artery disease, peripheral vascular disease and heart attacks.  Assessment and plan of treatment:	Low fat diet, exercise daily and continue current medications. Follow up with primary care physician and cardiologist for management. Principal Discharge DX:	CAD (coronary artery disease)  Goal:	You are being transferred [facility] for CABG Eval.  Assessment and plan of treatment:	You are being transferred to [facility] for CABG eval. Avoid heavy lifting. No strenuous activity x 3 weeks. No heavy lifting > 5-10 lbs x one week.  Monitor site of procedure for bleeding, pain, swelling, discharge. Notify MD if symptoms occur. You may shower,  no baths/swimming x one week. Heparin gtt to be started 6 hours post sheath pull. sheath pulled approximately at 21:30 on 10/17  Secondary Diagnosis:	Diabetes mellitus type 2 in nonobese  Goal:	To maintain a normal blood glucose level and HgA1C level < 5.7 and to prevent diabetic complications such as uncontrolled diabetes, diabetic coma, blindness, renal failure, and amputations.  Assessment and plan of treatment:	Monitor finger sticks pre-meal and bedtime, low salt, fat and carbohydrate diet, minimize glucose intake.  Exercise daily for at least 30 minutes and weight loss.  Follow up with primary care physician and endocrinologist for routine Hemoglobin A1C checks and management.  Follow up with your ophthalmologist for routine yearly vision exams.  Secondary Diagnosis:	HLD (hyperlipidemia)  Goal:	To maintain normal cholesterol levels to prevent stroke, coronary artery disease, peripheral vascular disease and heart attacks.  Assessment and plan of treatment:	Low fat diet, exercise daily and continue current medications. Follow up with primary care physician and cardiologist for management.  Secondary Diagnosis:	LUE numbness  Goal:	You were seen by neurology for LUE numbness and pain. To improve your symptoms, pain control as needed.  Assessment and plan of treatment:	Neuro recommended: recommend PT and OT and complete cardiac workup and continue pain control as needed and if no improvement in the next 2-3 weeks will perform outpatient EMG at [phone].

## 2018-10-17 NOTE — CHART NOTE - NSCHARTNOTEFT_GEN_A_CORE
Rt femoral [5 ] Fr arterial sheath discontinued.Maunal pressure applied for  15  mins and good hemostatis obtained.No hematoma or bleeding noted.Vital sign stable .Patient tolerated procedure well. RN instructed to monitor groin for bleeding or hematoma  .

## 2018-10-17 NOTE — CONSULT NOTE ADULT - PROBLEM SELECTOR RECOMMENDATION 9
CTA chest and abdomen noted. Calcification of all 3 vessels . Awaiting cardiac cath . Cardiology helping.

## 2018-10-17 NOTE — CONSULT NOTE ADULT - ASSESSMENT
58 year old male current smoker with family history of CAD with DM-II who presented to [facility] ED 10/17 complaining of chest pain/pressure with associated left arm numbness/weakness.  Denies dizziness, syncope, edema, orthopnea and PND.  CT head was performed showing no CVA and neurology was consulted who thought his symptoms were secondary to neuropathy.  Underwent a Cardiac work up, R/O MI, underwent Nuclear stress test revealing EF 52%, medium sized, moderate defects in inferior and inferoseptal walls that are reversible, suggestive of ischemia.   In light of patients cardiac risk factors, symptoms and abnormal noninvasive test findings there is high suspicion for CAD. Still have left shoulder pain with numbness left hand thumb and index finger.

## 2018-10-17 NOTE — DISCHARGE NOTE ADULT - PATIENT PORTAL LINK FT
You can access the [facility] Patient Portal, offered by [facility], by registering with the following website: [email or web address]

## 2018-10-17 NOTE — CONSULT NOTE ADULT - SUBJECTIVE AND OBJECTIVE BOX
Requesting Physician : Dr. Gibson    Reason for Consultation: CAD    HISTORY OF PRESENT ILLNESS:  59 yo M with history of DM, tobacco abuse, FHx of cad who is being seen for new onset angina.  The patient was admitted with chest pain and left arm numbness/weakness.  CTH was performed showing no CVA and neurology was consulted who thought his symptoms were secondary to neuropathy.  NST performed inferior ischemia.  Interventional cardiology consulted for cath consult.  Pt. currently chest pain free.  He describes the pain as pressure like and occurring at rest.  Pt. reports medication compliance and no bleeding history.       PAST MEDICAL & SURGICAL HISTORY:  Diabetes mellitus type 2 in nonobese  Diabetes, type I  Paraumbilical hernia          MEDICATIONS:  MEDICATIONS  (STANDING):      Allergies    No Known Allergies    Intolerances        FAMILY HISTORY:  Family history of coronary artery disease (Father)        SOCIAL HISTORY:    [ ] Non-smoker  [x ] Smoker  [ ] Alcohol      REVIEW OF SYSTEMS:  [x ]chest pain  [  ]shortness of breath  [  ]palpitations  [  ]syncope  [ ]near syncope [x ]upper extremity weakness   [ ] lower extremity weakness  [  ]diplopia  [  ]altered mental status   [  ]fevers  [ ]chills [ ]nausea  [ ]vomitting  [  ]dysphagia    [ ]abdominal pain  [ ]melena  [ ]BRBPR    [  ]epistaxis  [  ]rash    [ ]lower extremity edema        [x ] All others negative	  [ ] Unable to obtain    PHYSICAL EXAM:  T(C): 37 (10-17-18 @ 13:55), Max: 37 (10-17-18 @ 13:55)  HR: 85 (10-17-18 @ 13:55) (85 - 94)  BP: 134/94 (10-17-18 @ 13:55) (122/79 - 136/88)  RR: 16 (10-17-18 @ 13:55) (16 - 20)  SpO2: 97% (10-17-18 @ 13:55) (97% - 100%)  Wt(kg): --  I&O's Summary        HEENT:   Normal oral mucosa, PERRL, EOMI	  Lymphatic: No lymphadenopathy , no edema  Cardiovascular: Normal S1 S2, No JVD, RRR, No murmurs , Peripheral pulses palpable 2+ bilaterally  Respiratory: Lungs clear to auscultation, normal effort 	  Gastrointestinal:  Soft, Non-tender, + BS	  Skin: No rashes, No ecchymoses, No cyanosis, warm to touch        TELEMETRY: SR	    ECG: NSR	  RADIOLOGY:  OTHER:     DIAGNOSTIC TESTING:  [ ] Echocardiogram:  [ ]  Catheterization:  [ ] Stress Test:    	  	  LABS:	 	    CARDIAC MARKERS:                              14.4   5.75  )-----------( 255      ( 16 Oct 2018 07:30 )             42.3     10-16    133<L>  |  97<L>  |  19  ----------------------------<  329<H>  3.9   |  18<L>  |  0.91    Ca    9.0      16 Oct 2018 07:30    TPro  7.3  /  Alb  4.2  /  TBili  0.4  /  DBili  x   /  AST  39  /  ALT  56<H>  /  AlkPhos  116  10-16    proBNP:   Lipid Profile:   HgA1c:   TSH:     ASSESSMENT/PLAN: 	58yMale with history of DM, Tobacco abuse, FHx of CAD admitted with chest pain and abnormal NST.   -Given symptoms, abnormal NST showing inferior ischemia, I am concerned for new onset unstable angina.   -Therefore recommend cardiac cath today  -All risks, benefits, indications, contraindications, inferior alternative options of cardiac cath explained to the patient.  He understands everything and elects for cardiac cath.   -Further workup pending cath      Linda Hoffman MD Requesting Physician : Dr. Gibson    Reason for Consultation: CAD    HISTORY OF PRESENT ILLNESS:  57 yo M with history of DM, tobacco abuse, FHx of cad who is being seen for new onset angina.  The patient was admitted with chest pain and left arm numbness/weakness.  CTH was performed showing no CVA and neurology was consulted who thought his symptoms were secondary to neuropathy.  NST performed inferior ischemia.  Interventional cardiology consulted for cath consult.  Pt. currently chest pain free.  He describes the pain as pressure like and occurring at rest.  Pt. reports medication compliance and no bleeding history.       PAST MEDICAL & SURGICAL HISTORY:  Diabetes mellitus type 2 in nonobese  Diabetes, type I  Paraumbilical hernia          MEDICATIONS:  MEDICATIONS  (STANDING):      Allergies    No Known Allergies    Intolerances        FAMILY HISTORY:  Family history of coronary artery disease (Father)        SOCIAL HISTORY:    [ ] Non-smoker  [x ] Smoker  [ ] Alcohol      REVIEW OF SYSTEMS:  [x ]chest pain  [  ]shortness of breath  [  ]palpitations  [  ]syncope  [ ]near syncope [x ]upper extremity weakness   [ ] lower extremity weakness  [  ]diplopia  [  ]altered mental status   [  ]fevers  [ ]chills [ ]nausea  [ ]vomitting  [  ]dysphagia    [ ]abdominal pain  [ ]melena  [ ]BRBPR    [  ]epistaxis  [  ]rash    [ ]lower extremity edema        [x ] All others negative	  [ ] Unable to obtain    PHYSICAL EXAM:  T(C): 37 (10-17-18 @ 13:55), Max: 37 (10-17-18 @ 13:55)  HR: 85 (10-17-18 @ 13:55) (85 - 94)  BP: 134/94 (10-17-18 @ 13:55) (122/79 - 136/88)  RR: 16 (10-17-18 @ 13:55) (16 - 20)  SpO2: 97% (10-17-18 @ 13:55) (97% - 100%)  Wt(kg): --  I&O's Summary        HEENT:   Normal oral mucosa, PERRL, EOMI	  Lymphatic: No lymphadenopathy , no edema  Cardiovascular: Normal S1 S2, No JVD, RRR, No murmurs , Peripheral pulses palpable 2+ bilaterally  Respiratory: Lungs clear to auscultation, normal effort 	  Gastrointestinal:  Soft, Non-tender, + BS	  Skin: No rashes, No ecchymoses, No cyanosis, warm to touch        TELEMETRY: SR	    ECG: NSR	  RADIOLOGY:  OTHER:     DIAGNOSTIC TESTING:  [ ] Echocardiogram:  [ ]  Catheterization:  [ ] Stress Test:    	  	  LABS:	 	    CARDIAC MARKERS:                              14.4   5.75  )-----------( 255      ( 16 Oct 2018 07:30 )             42.3     10-16    133<L>  |  97<L>  |  19  ----------------------------<  329<H>  3.9   |  18<L>  |  0.91    Ca    9.0      16 Oct 2018 07:30    TPro  7.3  /  Alb  4.2  /  TBili  0.4  /  DBili  x   /  AST  39  /  ALT  56<H>  /  AlkPhos  116  10-16    proBNP:   Lipid Profile:   HgA1c:   TSH:     ASSESSMENT/PLAN: 	58yMale with history of DM, Tobacco abuse, FHx of CAD admitted with chest pain and abnormal NST.   -Given symptoms, abnormal NST showing inferior ischemia, I am concerned for new onset unstable angina.   -Therefore recommend cardiac cath today  -All risks, benefits, indications, contraindications, inferior alternative options of cardiac cath explained to the patient.  He understands everything and elects for cardiac cath.   -Further workup pending cath  -tobacco cessation counseling provided       Linda Hoffman MD

## 2018-10-17 NOTE — ED CDU PROVIDER SUBSEQUENT DAY NOTE - ATTENDING CONTRIBUTION TO CARE
Attending Statement: I have reviewed and agree with all pertinent clinical information, including history and physical exam and agree with treatment plan of the PA, except as noted.  59yo M hx of DM, smoker, presented chest pain and sob. States that he had "really bad" left sided chest pain radiated to the left arm, with "tingling and numbness of the left arm" no sob/daniels no orthopnea. no productive cough. no fever or chills. no nausea/vomit no abdominal pain. no leg swelling or calf pain. no travel. Now denies chest pain. Endorsing left arm/shoulder sharp pain.   Vital signs noted. pt sitting up eating, nontoxic. EOMI. mmm. normal S1-S2 No resp distress. able to speak in full and clear sentences. no wheeze, rales or stridor. AO3  plan labs wn stress today

## 2018-10-17 NOTE — CONSULT NOTE ADULT - SUBJECTIVE AND OBJECTIVE BOX
HPI: 58 year old male with Type 2 DM and tobacco abuse, presented to ER 10/16 with severe chest pain radiating to his left shoulder with associated Left arm numbness and tingling.  CT head in ED negative and Neuro eval noted.  PT c/o ongoing L shoulder pain with movement and position.  He reports occasional palpitations, denies near syncope or syncope.      PAST MEDICAL & SURGICAL HISTORY:  Diabetes mellitus type 2   Paraumbilical hernia    MEDICATIONS  (STANDING): home: none    Allergies  No Known Allergies    FAMILY HISTORY:  Family history of coronary artery disease-father in 70s  Noncontributory for premature coronary disease or sudden cardiac death    SOCIAL HISTORY:    [ ] Non-smoker  [+ ] Smoker  [ ] Alcohol      REVIEW OF SYSTEMS:  [x ]chest pain  [  ]shortness of breath  [  ]palpitations  [  ]syncope  [ ]near syncope [ ]upper extremity weakness   [ ] lower extremity weakness  [  ]diplopia  [  ]altered mental status   [  ]fevers  [ ]chills [ ]nausea  [ ]vomiting  [  ]dysphagia    [ ]abdominal pain  [ ]melena  [ ]BRBPR    [  ]epistaxis  [  ]rash  [ ]lower extremity edema      [x ] All others negative	  [ ] Unable to obtain    PHYSICAL EXAM:  T(C): 37 (10-17-18 @ 13:55), Max: 37 (10-17-18 @ 13:55)  HR: 85 (10-17-18 @ 13:55) (85 - 94)  BP: 134/94 (10-17-18 @ 13:55) (122/79 - 136/88)  RR: 16 (10-17-18 @ 13:55) (16 - 20)  SpO2: 97% (10-17-18 @ 13:55) (97% - 100%)    Appearance: Normal	  HEENT:   Normal oral mucosa, PERRL, EOMI	  Lymphatic: No lymphadenopathy , no edema  Cardiovascular: Normal S1 S2, No JVD, No murmurs , Peripheral pulses palpable 2+ bilaterally  Respiratory: Lungs clear to auscultation, normal effort 	  Gastrointestinal:  Soft, Non-tender, + BS	  Skin: No rashes, No ecchymoses, No cyanosis, warm to touch  Psychiatry:  Mood & affect appropriate    DIAGNOSTIC DATA:      ECG:  NSR, normal intervals	    < from: Nuclear Stress Test-Exercise (10.17.18 @ 09:43) >  NUCLEAR FINDINGS:  Review of raw data shows: The study is of good technical  quality.  There are medium sized, moderate defects in inferior and  inferoseptal walls that are reversible, suggestive of  ischemia.  ------------------------------------------------------------------------  GATED ANALYSIS:  Post-stress gated wall motion analysis was performed (LVEF  = 52 %;LVEDV= 62 ml.), revealing mild hypokinesis of the  inferior wall. RV function appeared normal.  RV function  appeared normal.  ------------------------------------------------------------------------  IMPRESSIONS:Abnormal Study  * Myocardial Perfusion SPECT results are abnormal at 90 %  of MPHR.  * There are medium sized, moderate defects in inferior and  inferoseptal walls that are reversible, suggestive of  ischemia.  * Post-stress gated wall motion analysis was performed  (LVEF = 52 %;LVEDV = 62 ml.), revealing mild hypokinesis  of the inferior wall. RV function appeared normal.  RV  function appeared normal.  *** No previous Nuclear/Stress exam.  ------------------------------------------------------------------------  Confirmed on  10/17/2018- 13:17:14 by Alma Ferrer MD    < end of copied text >    < from: CT Head No Cont (10.16.18 @ 12:35) >  IMPRESSION:    Normal non-contrast CT of the brain.    < end of copied text >    < from: CT Angio Chest w/ IV Cont (10.16.18 @ 08:37) >  IMPRESSION:     1.  No intramural hematoma or aortic dissection.  2.  Coronary arterial calcifications involving the left anterior   descending artery, left circumflex artery, and right coronary artery.  < end of copied text >  	  LABS:	 	                      14.4   5.75  )-----------( 255      ( 16 Oct 2018 07:30 )             42.3    133<L>  |  97<L>  |  19  ----------------------------<  329<H>  3.9   |  18<L>  |  0.91    Ca    9.0      16 Oct 2018 07:30    TPro  7.3  /  Alb  4.2  /  TBili  0.4  /  DBili  x   /  AST  39  /  ALT  56<H>  /  AlkPhos  116  10-16      ASSESSMENT/PLAN:   58 year old male with Type 2 DM and tobacco abuse, presented to ER 10/16 with severe chest pain radiating to his left shoulder with associated Left arm numbness and tingling.  CT head in ED negative and Neuro eval noted.  PT c/o ongoing L shoulder pain with movement and position.  He reports occasional palpitations, denies near syncope or syncope.  A CTPA was negative for PE but revealed coronary calcifications, and a NST with ischemia, being admitted for [facility]    --f/u [facility] findings  --monitor tele  --When DC planned, recommend OP event monitor

## 2018-10-17 NOTE — DISCHARGE NOTE ADULT - PLAN OF CARE
You are being transferred [facility] for CABG Eval. You are being transferred to [facility] for CABG eval. Avoid heavy lifting. No strenuous activity x 3 weeks. No heavy lifting > 5-10 lbs x one week.  Monitor site of procedure for bleeding, pain, swelling, discharge. Notify MD if symptoms occur. You may shower,  no baths/swimming x one week. Heparin gtt to be started 6 hours post sheath pull. sheath pulled approximately at 21:30 on 10/17 To maintain a normal blood glucose level and HgA1C level < 5.7 and to prevent diabetic complications such as uncontrolled diabetes, diabetic coma, blindness, renal failure, and amputations. Monitor finger sticks pre-meal and bedtime, low salt, fat and carbohydrate diet, minimize glucose intake.  Exercise daily for at least 30 minutes and weight loss.  Follow up with primary care physician and endocrinologist for routine Hemoglobin A1C checks and management.  Follow up with your ophthalmologist for routine yearly vision exams. To maintain normal cholesterol levels to prevent stroke, coronary artery disease, peripheral vascular disease and heart attacks. Low fat diet, exercise daily and continue current medications. Follow up with primary care physician and cardiologist for management. You were seen by neurology for LUE numbness and pain. To improve your symptoms, pain control as needed. Neuro recommended: recommend PT and OT and complete cardiac workup and continue pain control as needed and if no improvement in the next 2-3 weeks will perform outpatient EMG at [phone].

## 2018-10-17 NOTE — CHART NOTE - NSCHARTNOTEFT_GEN_A_CORE
Patient is s/p cardiac cath. Patient without any complaints. RFA site is stable. No hematoma. No swelling Dressing is clean/intact/dry. good Pedal pulse..  will monitor closely. sheath removed at 21:30. It was recommended to start hep gtt 6 hours post sheath pull if RFA site is stable.

## 2018-10-18 ENCOUNTER — INPATIENT (INPATIENT)
Facility: HOSPITAL | Age: 58
LOS: 8 days | Discharge: HOME | DRG: 229 | End: 2018-10-27
Attending: THORACIC SURGERY (CARDIOTHORACIC VASCULAR SURGERY) | Admitting: THORACIC SURGERY (CARDIOTHORACIC VASCULAR SURGERY)
Payer: COMMERCIAL

## 2018-10-18 VITALS
OXYGEN SATURATION: 97 % | HEIGHT: 66 IN | HEART RATE: 71 BPM | DIASTOLIC BLOOD PRESSURE: 109 MMHG | RESPIRATION RATE: 18 BRPM | TEMPERATURE: 98 F | WEIGHT: 158.73 LBS | SYSTOLIC BLOOD PRESSURE: 162 MMHG

## 2018-10-18 DIAGNOSIS — I25.118 ATHEROSCLEROTIC HEART DISEASE OF NATIVE CORONARY ARTERY WITH OTHER FORMS OF ANGINA PECTORIS: ICD-10-CM

## 2018-10-18 DIAGNOSIS — I25.10 ATHEROSCLEROTIC HEART DISEASE OF NATIVE CORONARY ARTERY WITHOUT ANGINA PECTORIS: ICD-10-CM

## 2018-10-18 DIAGNOSIS — K42.9 UMBILICAL HERNIA WITHOUT OBSTRUCTION OR GANGRENE: Chronic | ICD-10-CM

## 2018-10-18 DIAGNOSIS — E11.9 TYPE 2 DIABETES MELLITUS WITHOUT COMPLICATIONS: ICD-10-CM

## 2018-10-18 PROBLEM — E10.9 TYPE 1 DIABETES MELLITUS WITHOUT COMPLICATIONS: Chronic | Status: INACTIVE | Noted: 2018-10-16 | Resolved: 2018-10-18

## 2018-10-18 LAB
ALBUMIN SERPL ELPH-MCNC: 3.8 G/DL — SIGNIFICANT CHANGE UP (ref 3.3–5)
ALP SERPL-CCNC: 77 U/L — SIGNIFICANT CHANGE UP (ref 40–120)
ALT FLD-CCNC: 60 U/L — HIGH (ref 10–45)
ANION GAP SERPL CALC-SCNC: 15 MMOL/L — SIGNIFICANT CHANGE UP (ref 5–17)
APPEARANCE UR: CLEAR — SIGNIFICANT CHANGE UP
APTT BLD: 30 SEC — SIGNIFICANT CHANGE UP (ref 27.5–37.4)
APTT BLD: 57.3 SEC — HIGH (ref 27.5–37.4)
AST SERPL-CCNC: 47 U/L — HIGH (ref 10–40)
BILIRUB SERPL-MCNC: 0.5 MG/DL — SIGNIFICANT CHANGE UP (ref 0.2–1.2)
BILIRUB UR-MCNC: NEGATIVE — SIGNIFICANT CHANGE UP
BLD GP AB SCN SERPL QL: NEGATIVE — SIGNIFICANT CHANGE UP
BUN SERPL-MCNC: 13 MG/DL — SIGNIFICANT CHANGE UP (ref 7–23)
CALCIUM SERPL-MCNC: 8.6 MG/DL — SIGNIFICANT CHANGE UP (ref 8.4–10.5)
CHLORIDE SERPL-SCNC: 103 MMOL/L — SIGNIFICANT CHANGE UP (ref 96–108)
CO2 SERPL-SCNC: 20 MMOL/L — LOW (ref 22–31)
COLOR SPEC: SIGNIFICANT CHANGE UP
CREAT SERPL-MCNC: 0.85 MG/DL — SIGNIFICANT CHANGE UP (ref 0.5–1.3)
DIFF PNL FLD: NEGATIVE — SIGNIFICANT CHANGE UP
GLUCOSE SERPL-MCNC: 177 MG/DL — HIGH (ref 70–99)
GLUCOSE UR QL: ABNORMAL
HCT VFR BLD CALC: 42.8 % — SIGNIFICANT CHANGE UP (ref 39–50)
HCT VFR BLD CALC: 46.4 % — SIGNIFICANT CHANGE UP (ref 39–50)
HGB BLD-MCNC: 14.8 G/DL — SIGNIFICANT CHANGE UP (ref 13–17)
HGB BLD-MCNC: 15.8 G/DL — SIGNIFICANT CHANGE UP (ref 13–17)
INR BLD: 1.08 RATIO — SIGNIFICANT CHANGE UP (ref 0.88–1.16)
INR BLD: 1.09 RATIO — SIGNIFICANT CHANGE UP (ref 0.88–1.16)
KETONES UR-MCNC: SIGNIFICANT CHANGE UP
LEUKOCYTE ESTERASE UR-ACNC: NEGATIVE — SIGNIFICANT CHANGE UP
MCHC RBC-ENTMCNC: 30.3 PG — SIGNIFICANT CHANGE UP (ref 27–34)
MCHC RBC-ENTMCNC: 30.6 PG — SIGNIFICANT CHANGE UP (ref 27–34)
MCHC RBC-ENTMCNC: 34 GM/DL — SIGNIFICANT CHANGE UP (ref 32–36)
MCHC RBC-ENTMCNC: 34.6 GM/DL — SIGNIFICANT CHANGE UP (ref 32–36)
MCV RBC AUTO: 88.4 FL — SIGNIFICANT CHANGE UP (ref 80–100)
MCV RBC AUTO: 89 FL — SIGNIFICANT CHANGE UP (ref 80–100)
MRSA PCR RESULT.: SIGNIFICANT CHANGE UP
NITRITE UR-MCNC: NEGATIVE — SIGNIFICANT CHANGE UP
NT-PROBNP SERPL-SCNC: 63 PG/ML — SIGNIFICANT CHANGE UP (ref 0–300)
PH UR: 6 — SIGNIFICANT CHANGE UP (ref 5–8)
PLATELET # BLD AUTO: 250 K/UL — SIGNIFICANT CHANGE UP (ref 150–400)
PLATELET # BLD AUTO: 261 K/UL — SIGNIFICANT CHANGE UP (ref 150–400)
POTASSIUM SERPL-MCNC: 3.8 MMOL/L — SIGNIFICANT CHANGE UP (ref 3.5–5.3)
POTASSIUM SERPL-SCNC: 3.8 MMOL/L — SIGNIFICANT CHANGE UP (ref 3.5–5.3)
PROT SERPL-MCNC: 7.1 G/DL — SIGNIFICANT CHANGE UP (ref 6–8.3)
PROT UR-MCNC: ABNORMAL
PROTHROM AB SERPL-ACNC: 11.8 SEC — SIGNIFICANT CHANGE UP (ref 9.8–12.7)
PROTHROM AB SERPL-ACNC: 11.8 SEC — SIGNIFICANT CHANGE UP (ref 9.8–12.7)
RBC # BLD: 4.85 M/UL — SIGNIFICANT CHANGE UP (ref 4.2–5.8)
RBC # BLD: 5.22 M/UL — SIGNIFICANT CHANGE UP (ref 4.2–5.8)
RBC # FLD: 12.5 % — SIGNIFICANT CHANGE UP (ref 10.3–14.5)
RBC # FLD: 12.8 % — SIGNIFICANT CHANGE UP (ref 10.3–14.5)
RH IG SCN BLD-IMP: POSITIVE — SIGNIFICANT CHANGE UP
S AUREUS DNA NOSE QL NAA+PROBE: SIGNIFICANT CHANGE UP
SODIUM SERPL-SCNC: 138 MMOL/L — SIGNIFICANT CHANGE UP (ref 135–145)
SP GR SPEC: 1.03 — HIGH (ref 1.01–1.02)
T3 SERPL-MCNC: 125 NG/DL — SIGNIFICANT CHANGE UP (ref 80–200)
T4 AB SER-ACNC: 7.3 UG/DL — SIGNIFICANT CHANGE UP (ref 4.6–12)
TSH SERPL-MCNC: 4.71 UIU/ML — HIGH (ref 0.27–4.2)
UROBILINOGEN FLD QL: NEGATIVE — SIGNIFICANT CHANGE UP
WBC # BLD: 5.5 K/UL — SIGNIFICANT CHANGE UP (ref 3.8–10.5)
WBC # BLD: 6.7 K/UL — SIGNIFICANT CHANGE UP (ref 3.8–10.5)
WBC # FLD AUTO: 5.5 K/UL — SIGNIFICANT CHANGE UP (ref 3.8–10.5)
WBC # FLD AUTO: 6.7 K/UL — SIGNIFICANT CHANGE UP (ref 3.8–10.5)

## 2018-10-18 PROCEDURE — 99223 1ST HOSP IP/OBS HIGH 75: CPT | Mod: 24

## 2018-10-18 PROCEDURE — 73030 X-RAY EXAM OF SHOULDER: CPT | Mod: 26,LT

## 2018-10-18 PROCEDURE — 93880 EXTRACRANIAL BILAT STUDY: CPT | Mod: 26

## 2018-10-18 PROCEDURE — 94010 BREATHING CAPACITY TEST: CPT | Mod: 26

## 2018-10-18 PROCEDURE — 93306 TTE W/DOPPLER COMPLETE: CPT | Mod: 26

## 2018-10-18 PROCEDURE — 71046 X-RAY EXAM CHEST 2 VIEWS: CPT | Mod: 26

## 2018-10-18 RX ORDER — DEXTROSE 50 % IN WATER 50 %
25 SYRINGE (ML) INTRAVENOUS ONCE
Qty: 0 | Refills: 0 | Status: DISCONTINUED | OUTPATIENT
Start: 2018-10-18 | End: 2018-10-22

## 2018-10-18 RX ORDER — SODIUM CHLORIDE 9 MG/ML
1000 INJECTION, SOLUTION INTRAVENOUS
Qty: 0 | Refills: 0 | Status: DISCONTINUED | OUTPATIENT
Start: 2018-10-18 | End: 2018-10-22

## 2018-10-18 RX ORDER — OXYCODONE AND ACETAMINOPHEN 5; 325 MG/1; MG/1
1 TABLET ORAL EVERY 4 HOURS
Qty: 0 | Refills: 0 | Status: DISCONTINUED | OUTPATIENT
Start: 2018-10-18 | End: 2018-10-22

## 2018-10-18 RX ORDER — ACETAMINOPHEN 500 MG
650 TABLET ORAL EVERY 6 HOURS
Qty: 0 | Refills: 0 | Status: DISCONTINUED | OUTPATIENT
Start: 2018-10-18 | End: 2018-10-22

## 2018-10-18 RX ORDER — ASPIRIN/CALCIUM CARB/MAGNESIUM 324 MG
81 TABLET ORAL DAILY
Qty: 0 | Refills: 0 | Status: DISCONTINUED | OUTPATIENT
Start: 2018-10-18 | End: 2018-10-22

## 2018-10-18 RX ORDER — METOPROLOL TARTRATE 50 MG
12.5 TABLET ORAL
Qty: 0 | Refills: 0 | Status: DISCONTINUED | OUTPATIENT
Start: 2018-10-18 | End: 2018-10-18

## 2018-10-18 RX ORDER — SODIUM CHLORIDE 9 MG/ML
3 INJECTION INTRAMUSCULAR; INTRAVENOUS; SUBCUTANEOUS EVERY 8 HOURS
Qty: 0 | Refills: 0 | Status: DISCONTINUED | OUTPATIENT
Start: 2018-10-18 | End: 2018-10-22

## 2018-10-18 RX ORDER — HYDROMORPHONE HYDROCHLORIDE 2 MG/ML
0.5 INJECTION INTRAMUSCULAR; INTRAVENOUS; SUBCUTANEOUS ONCE
Qty: 0 | Refills: 0 | Status: DISCONTINUED | OUTPATIENT
Start: 2018-10-18 | End: 2018-10-18

## 2018-10-18 RX ORDER — INSULIN LISPRO 100/ML
6 VIAL (ML) SUBCUTANEOUS
Qty: 0 | Refills: 0 | Status: DISCONTINUED | OUTPATIENT
Start: 2018-10-18 | End: 2018-10-21

## 2018-10-18 RX ORDER — MORPHINE SULFATE 50 MG/1
2 CAPSULE, EXTENDED RELEASE ORAL ONCE
Qty: 0 | Refills: 0 | Status: DISCONTINUED | OUTPATIENT
Start: 2018-10-18 | End: 2018-10-18

## 2018-10-18 RX ORDER — METOPROLOL TARTRATE 50 MG
12.5 TABLET ORAL
Qty: 0 | Refills: 0 | Status: DISCONTINUED | OUTPATIENT
Start: 2018-10-18 | End: 2018-10-19

## 2018-10-18 RX ORDER — OXYCODONE AND ACETAMINOPHEN 5; 325 MG/1; MG/1
2 TABLET ORAL EVERY 6 HOURS
Qty: 0 | Refills: 0 | Status: DISCONTINUED | OUTPATIENT
Start: 2018-10-18 | End: 2018-10-22

## 2018-10-18 RX ORDER — GLUCAGON INJECTION, SOLUTION 0.5 MG/.1ML
1 INJECTION, SOLUTION SUBCUTANEOUS ONCE
Qty: 0 | Refills: 0 | Status: DISCONTINUED | OUTPATIENT
Start: 2018-10-18 | End: 2018-10-22

## 2018-10-18 RX ORDER — DEXTROSE 50 % IN WATER 50 %
15 SYRINGE (ML) INTRAVENOUS ONCE
Qty: 0 | Refills: 0 | Status: DISCONTINUED | OUTPATIENT
Start: 2018-10-18 | End: 2018-10-22

## 2018-10-18 RX ORDER — INSULIN LISPRO 100/ML
VIAL (ML) SUBCUTANEOUS
Qty: 0 | Refills: 0 | Status: DISCONTINUED | OUTPATIENT
Start: 2018-10-18 | End: 2018-10-22

## 2018-10-18 RX ORDER — INSULIN GLARGINE 100 [IU]/ML
8 INJECTION, SOLUTION SUBCUTANEOUS AT BEDTIME
Qty: 0 | Refills: 0 | Status: DISCONTINUED | OUTPATIENT
Start: 2018-10-18 | End: 2018-10-19

## 2018-10-18 RX ORDER — DEXTROSE 50 % IN WATER 50 %
12.5 SYRINGE (ML) INTRAVENOUS ONCE
Qty: 0 | Refills: 0 | Status: DISCONTINUED | OUTPATIENT
Start: 2018-10-18 | End: 2018-10-22

## 2018-10-18 RX ORDER — ATORVASTATIN CALCIUM 80 MG/1
40 TABLET, FILM COATED ORAL AT BEDTIME
Qty: 0 | Refills: 0 | Status: DISCONTINUED | OUTPATIENT
Start: 2018-10-18 | End: 2018-10-22

## 2018-10-18 RX ORDER — HEPARIN SODIUM 5000 [USP'U]/ML
1000 INJECTION INTRAVENOUS; SUBCUTANEOUS
Qty: 25000 | Refills: 0 | Status: DISCONTINUED | OUTPATIENT
Start: 2018-10-18 | End: 2018-10-22

## 2018-10-18 RX ADMIN — SODIUM CHLORIDE 3 MILLILITER(S): 9 INJECTION INTRAMUSCULAR; INTRAVENOUS; SUBCUTANEOUS at 22:02

## 2018-10-18 RX ADMIN — Medication 6 UNIT(S): at 17:09

## 2018-10-18 RX ADMIN — Medication 2: at 22:04

## 2018-10-18 RX ADMIN — SODIUM CHLORIDE 3 MILLILITER(S): 9 INJECTION INTRAMUSCULAR; INTRAVENOUS; SUBCUTANEOUS at 12:45

## 2018-10-18 RX ADMIN — Medication 12.5 MILLIGRAM(S): at 05:25

## 2018-10-18 RX ADMIN — HYDROMORPHONE HYDROCHLORIDE 0.5 MILLIGRAM(S): 2 INJECTION INTRAMUSCULAR; INTRAVENOUS; SUBCUTANEOUS at 20:58

## 2018-10-18 RX ADMIN — Medication 2: at 12:43

## 2018-10-18 RX ADMIN — HEPARIN SODIUM 10 UNIT(S)/HR: 5000 INJECTION INTRAVENOUS; SUBCUTANEOUS at 03:30

## 2018-10-18 RX ADMIN — ATORVASTATIN CALCIUM 40 MILLIGRAM(S): 80 TABLET, FILM COATED ORAL at 22:04

## 2018-10-18 RX ADMIN — OXYCODONE AND ACETAMINOPHEN 2 TABLET(S): 5; 325 TABLET ORAL at 01:49

## 2018-10-18 RX ADMIN — OXYCODONE AND ACETAMINOPHEN 2 TABLET(S): 5; 325 TABLET ORAL at 02:20

## 2018-10-18 RX ADMIN — HYDROMORPHONE HYDROCHLORIDE 0.5 MILLIGRAM(S): 2 INJECTION INTRAMUSCULAR; INTRAVENOUS; SUBCUTANEOUS at 20:18

## 2018-10-18 RX ADMIN — Medication 2: at 08:36

## 2018-10-18 RX ADMIN — Medication 12.5 MILLIGRAM(S): at 17:10

## 2018-10-18 RX ADMIN — Medication 650 MILLIGRAM(S): at 12:48

## 2018-10-18 RX ADMIN — INSULIN GLARGINE 8 UNIT(S): 100 INJECTION, SOLUTION SUBCUTANEOUS at 22:04

## 2018-10-18 RX ADMIN — SODIUM CHLORIDE 3 MILLILITER(S): 9 INJECTION INTRAMUSCULAR; INTRAVENOUS; SUBCUTANEOUS at 05:26

## 2018-10-18 RX ADMIN — OXYCODONE AND ACETAMINOPHEN 2 TABLET(S): 5; 325 TABLET ORAL at 18:51

## 2018-10-18 RX ADMIN — MORPHINE SULFATE 2 MILLIGRAM(S): 50 CAPSULE, EXTENDED RELEASE ORAL at 01:25

## 2018-10-18 RX ADMIN — Medication 2: at 17:09

## 2018-10-18 RX ADMIN — MORPHINE SULFATE 2 MILLIGRAM(S): 50 CAPSULE, EXTENDED RELEASE ORAL at 00:49

## 2018-10-18 RX ADMIN — Medication 650 MILLIGRAM(S): at 13:20

## 2018-10-18 RX ADMIN — OXYCODONE AND ACETAMINOPHEN 2 TABLET(S): 5; 325 TABLET ORAL at 19:21

## 2018-10-18 RX ADMIN — Medication 81 MILLIGRAM(S): at 12:44

## 2018-10-18 NOTE — H&P ADULT - PROBLEM SELECTOR PLAN 1
Admit to 2 [facility] Telemetry floor   Morphine 2 mg IV x 1   Pre op cardiac surgery work up   TTE   carotid Duplex study   Activity as tolerated   PFT's   CXR PA/ LA   Continue with ASA 81 mg PO daily   Continue Lopressor 12.5 mg PO BID  Glycemic Control   Plan for cardiac surgery with Dr. Baldwin this admission Admit to 2 [facility] Telemetry floor   Morphine 2 mg IV x 1   Pre op cardiac surgery work up   Start Heparin gtt for ACS 6 hours post sheath removal   TTE   carotid Duplex study   Activity as tolerated   PFT's   CXR PA/ LA   Continue with ASA 81 mg PO daily   Continue Lopressor 12.5 mg PO BID  Glycemic Control   Plan for cardiac surgery with Dr. Baldwin this admission

## 2018-10-18 NOTE — PROGRESS NOTE ADULT - ASSESSMENT
58y Male WITH PMHx of current smoker, DM, Rt inguinal hernia repair and paraumbilical hernia repair who presented to [facility] ER with c/o chest pain x 8 hrs.  Pt notes after waking up 8 hrs, pt tried to get up out of bed and noticed left upper chest pain radiating to left upper back and shoulder described as moderate to severe aching pain. Patient was consulted by neurology for left arm and shoulder pain with associated numbness and tingling and weakness in his left arm. CT Head negative for any acute infarct.  Per neuro: DDX: brachial plexus stretch injury vs rotator cuff injury with nerve impingement. Would recommend PT and OT and complete cardiac workup and continue pain control as needed with NSAIDS and if no improvement in the next 2-3 weeks will perform outpatient EMG at [phone].     Patient was initially stayed in CDU for stress test which showed * Myocardial Perfusion SPECT results are abnormal at 90 % of MPHR. * There are medium sized, moderate defects in inferior and inferoseptal walls that are reversible, suggestive of ischemia. * Post-stress gated wall motion analysis was performed (LVEF = 52 %;LVEDV = 62 ml.), revealing mild hypokinesis of the inferior wall. RV function appeared normal.  RV function appeared normal. *** No previous Nuclear/Stress exam. Patient was admitted for abnormal stress test and underwent cardiac cath which showed: pLAD 70 with +IFR 0.89, OM1 90, m/dRCA 70; RFA access . Sheath removed by 21:30. RFA site appears stable. No signs of bleeding or hematoma. Good pedal pulse.  It was recommended that Heparin gtt to be started 6 hours s/p sheath removal and transferred to  [facility] for CABG eval with Dr. Baldwin.    10/18 Pt doing well no issues overnight.  No CP, SOB.  Has some R Arm and shoulder pain from prior nerve impingement.  Continue pre-op work-up with plan for CABG next week.

## 2018-10-18 NOTE — PROGRESS NOTE ADULT - SUBJECTIVE AND OBJECTIVE BOX
Patient denies chest pain or shortness of breath.   Review of systems otherwise (-)  	  MEDICATIONS:  MEDICATIONS  (STANDING):  aspirin enteric coated 81 milliGRAM(s) Oral daily  atorvastatin 40 milliGRAM(s) Oral at bedtime  dextrose 5%. 1000 milliLiter(s) (50 mL/Hr) IV Continuous <Continuous>  dextrose 50% Injectable 12.5 Gram(s) IV Push once  dextrose 50% Injectable 25 Gram(s) IV Push once  dextrose 50% Injectable 25 Gram(s) IV Push once  heparin  Infusion 1000 Unit(s)/Hr (11 mL/Hr) IV Continuous <Continuous>  insulin glargine Injectable (LANTUS) 8 Unit(s) SubCutaneous at bedtime  insulin lispro (HumaLOG) corrective regimen sliding scale   SubCutaneous Before meals and at bedtime  insulin lispro Injectable (HumaLOG) 6 Unit(s) SubCutaneous three times a day before meals  metoprolol tartrate 12.5 milliGRAM(s) Oral two times a day  sodium chloride 0.9% lock flush 3 milliLiter(s) IV Push every 8 hours      LABS:	 	    CARDIAC MARKERS:                                15.8   6.7   )-----------( 261      ( 18 Oct 2018 16:03 )             46.4     Hemoglobin: 15.8 g/dL (10-18 @ 16:03)  Hemoglobin: 14.8 g/dL (10-18 @ 02:30)  Hemoglobin: 14.5 g/dL (10-17 @ 16:30)  Hemoglobin: 14.4 g/dL (10-16 @ 07:30)      10-18    138  |  103  |  13  ----------------------------<  177<H>  3.8   |  20<L>  |  0.85    Ca    8.6      18 Oct 2018 02:30    TPro  7.1  /  Alb  3.8  /  TBili  0.5  /  DBili  x   /  AST  47<H>  /  ALT  60<H>  /  AlkPhos  77  10-18    Creatinine Trend: 0.85<--, 1.06<--, 0.91<--    COAGS:   PT/INR - ( 18 Oct 2018 16:03 )   PT: 11.8 sec;   INR: 1.08 ratio         PTT - ( 18 Oct 2018 16:03 )  PTT:57.3 sec    proBNP: Serum Pro-Brain Natriuretic Peptide: 63 pg/mL (10-18 @ 02:30)    Lipid Profile:   HgA1c:   TSH: Thyroid Stimulating Hormone, Serum: 4.71 uIU/mL (10-18 @ 05:38)        PHYSICAL EXAM:  T(C): 36.6 (10-18-18 @ 12:20), Max: 37.2 (10-17-18 @ 20:25)  HR: 79 (10-18-18 @ 17:08) (67 - 90)  BP: 142/97 (10-18-18 @ 17:08) (121/90 - 162/109)  RR: 18 (10-18-18 @ 12:20) (16 - 18)  SpO2: 98% (10-18-18 @ 12:20) (95% - 98%)  Wt(kg): --  I&O's Summary    17 Oct 2018 07:01  -  18 Oct 2018 07:00  --------------------------------------------------------  IN: 235 mL / OUT: 0 mL / NET: 235 mL    18 Oct 2018 07:01  -  18 Oct 2018 17:45  --------------------------------------------------------  IN: 460 mL / OUT: 500 mL / NET: -40 mL      Height (cm): 167.64 (10-18 @ 00:05), 167.64 (10-17 @ 20:25)  Weight (kg): 72 (10-18 @ 00:05), 78.6 (10-17 @ 20:25)  BMI (kg/m2): 25.6 (10-18 @ 00:05), 28 (10-17 @ 20:25)  BSA (m2): 1.81 (10-18 @ 00:05), 1.88 (10-17 @ 20:25)    Gen: Appears well in NAD  HEENT:  (-)icterus (-)pallor  CV: N S1 S2 1/6 LOS (+)2 Pulses B/l  Resp:  Clear to ausculatation B/L, normal effort  GI: (+) BS Soft, NT, ND  Lymph:  (-)Edema, (-)obvious lymphadenopathy  Skin: Warm to touch, Normal turgor  Psych: Appropriate mood and affect      TELEMETRY: 	  sinus         ASSESSMENT/PLAN: 	58y  Male istory of DM, Tobacco abuse, FHx of CAD admitted with chest pain and abnormal NST s/p cath found to have 3VD    - Smoking cessation stressed  - CTS f/u  - For CABG    León Gibson MD, FACC

## 2018-10-18 NOTE — PROGRESS NOTE ADULT - SUBJECTIVE AND OBJECTIVE BOX
58M DM smoker with Abnormal Nuclear stress test, admitted to [address] [place] [address] overnight for cardiac surgery.  No issues overnight, no CP SOB.        VITAL SIGNS    Telemetry:  SR 60-80     Vital Signs Last 24 Hrs  T(C): 36.9 (10-18-18 @ 05:04), Max: 37.2 (10-17-18 @ 20:25)  T(F): 98.4 (10-18-18 @ 05:04), Max: 99 (10-17-18 @ 20:25)  HR: 82 (10-18-18 @ 05:04) (67 - 90)  BP: 142/87 (10-18-18 @ 05:04) (122/69 - 162/109)  RR: 18 (10-18-18 @ 05:04) (16 - 18)  SpO2: 95% (10-18-18 @ 05:04) (95% - 98%)                   10-17 @ 07:01  -  10-18 @ 07:00  --------------------------------------------------------  IN: 235 mL / OUT: 0 mL / NET: 235 mL    10-18 @ 07:01  -  10-18 @ 10:58  --------------------------------------------------------  IN: 220 mL / OUT: 300 mL / NET: -80 mL          Daily Height in cm: 167.64 (18 Oct 2018 00:05)    Daily Weight in k (18 Oct 2018 07:35)        CAPILLARY BLOOD GLUCOSE      POCT Blood Glucose.: 199 mg/dL (18 Oct 2018 07:51)  POCT Blood Glucose.: 189 mg/dL (17 Oct 2018 21:49)  POCT Blood Glucose.: 219 mg/dL (17 Oct 2018 17:43)  POCT Blood Glucose.: 223 mg/dL (17 Oct 2018 15:52)  POCT Blood Glucose.: 219 mg/dL (17 Oct 2018 12:32)                              PHYSICAL EXAM    Neurology: alert and oriented x 3, nonfocal, no gross deficits  CV : RRR No audible murmurs  Sternal Wound :  CDI , Stable  Lungs: CTA B/L   Abdomen: soft, nontender, nondistended, positive bowel sounds, last bowel movement   :   _ Urination           Extremities:     FROM X4 NO C/E/E          acetaminophen   Tablet .. 650 milliGRAM(s) Oral every 6 hours PRN  aspirin enteric coated 81 milliGRAM(s) Oral daily  atorvastatin 40 milliGRAM(s) Oral at bedtime  dextrose 40% Gel 15 Gram(s) Oral once PRN  dextrose 5%. 1000 milliLiter(s) IV Continuous <Continuous>  dextrose 50% Injectable 12.5 Gram(s) IV Push once  dextrose 50% Injectable 25 Gram(s) IV Push once  dextrose 50% Injectable 25 Gram(s) IV Push once  glucagon  Injectable 1 milliGRAM(s) IntraMuscular once PRN  heparin  Infusion 1000 Unit(s)/Hr IV Continuous <Continuous>  insulin lispro (HumaLOG) corrective regimen sliding scale   SubCutaneous Before meals and at bedtime  metoprolol tartrate 12.5 milliGRAM(s) Oral two times a day  oxyCODONE    5 mG/acetaminophen 325 mG 2 Tablet(s) Oral every 6 hours PRN  oxyCODONE    5 mG/acetaminophen 325 mG 1 Tablet(s) Oral every 4 hours PRN  sodium chloride 0.9% lock flush 3 milliLiter(s) IV Push every 8 hours                    Physical Therapy Rec:   Home  [ x ]   Home w/ PT  [  ]  Rehab  [  ]  Discussed with Cardiothoracic Team at AM rounds.

## 2018-10-18 NOTE — PROGRESS NOTE ADULT - SUBJECTIVE AND OBJECTIVE BOX
INTERVAL HPI/OVERNIGHT EVENTS: My left shoulder and upper arm is hurting   Vital Signs Last 24 Hrs  T(C): 37.2 (18 Oct 2018 20:07), Max: 37.2 (17 Oct 2018 20:25)  T(F): 98.9 (18 Oct 2018 20:07), Max: 99 (17 Oct 2018 20:25)  HR: 85 (18 Oct 2018 20:07) (67 - 90)  BP: 130/79 (18 Oct 2018 20:07) (121/90 - 162/109)  BP(mean): --  RR: 18 (18 Oct 2018 20:07) (16 - 18)  SpO2: 95% (18 Oct 2018 20:07) (95% - 98%)  I&O's Summary    17 Oct 2018 07:01  -  18 Oct 2018 07:00  --------------------------------------------------------  IN: 235 mL / OUT: 0 mL / NET: 235 mL    18 Oct 2018 07:01  -  18 Oct 2018 20:21  --------------------------------------------------------  IN: 460 mL / OUT: 500 mL / NET: -40 mL      MEDICATIONS  (STANDING):  aspirin enteric coated 81 milliGRAM(s) Oral daily  atorvastatin 40 milliGRAM(s) Oral at bedtime  dextrose 5%. 1000 milliLiter(s) (50 mL/Hr) IV Continuous <Continuous>  dextrose 50% Injectable 12.5 Gram(s) IV Push once  dextrose 50% Injectable 25 Gram(s) IV Push once  dextrose 50% Injectable 25 Gram(s) IV Push once  heparin  Infusion 1000 Unit(s)/Hr (11 mL/Hr) IV Continuous <Continuous>  insulin glargine Injectable (LANTUS) 8 Unit(s) SubCutaneous at bedtime  insulin lispro (HumaLOG) corrective regimen sliding scale   SubCutaneous Before meals and at bedtime  insulin lispro Injectable (HumaLOG) 6 Unit(s) SubCutaneous three times a day before meals  metoprolol tartrate 12.5 milliGRAM(s) Oral two times a day  sodium chloride 0.9% lock flush 3 milliLiter(s) IV Push every 8 hours    MEDICATIONS  (PRN):  acetaminophen   Tablet .. 650 milliGRAM(s) Oral every 6 hours PRN Mild Pain (1 - 3)  dextrose 40% Gel 15 Gram(s) Oral once PRN Blood Glucose LESS THAN 70 milliGRAM(s)/deciLiter  glucagon  Injectable 1 milliGRAM(s) IntraMuscular once PRN Glucose <70 milliGRAM(s)/deciLiter  oxyCODONE    5 mG/acetaminophen 325 mG 2 Tablet(s) Oral every 6 hours PRN Severe Pain (7 - 10)  oxyCODONE    5 mG/acetaminophen 325 mG 1 Tablet(s) Oral every 4 hours PRN Moderate Pain (4 - 6)    LABS:                        15.8   6.7   )-----------( 261      ( 18 Oct 2018 16:03 )             46.4     10    138  |  103  |  13  ----------------------------<  177<H>  3.8   |  20<L>  |  0.85    Ca    8.6      18 Oct 2018 02:30    TPro  7.1  /  Alb  3.8  /  TBili  0.5  /  DBili  x   /  AST  47<H>  /  ALT  60<H>  /  AlkPhos  77  10-18    PT/INR - ( 18 Oct 2018 16:03 )   PT: 11.8 sec;   INR: 1.08 ratio         PTT - ( 18 Oct 2018 16:03 )  PTT:57.3 sec  Urinalysis Basic - ( 18 Oct 2018 08:50 )    Color: Light Yellow / Appearance: Clear / S.031 / pH: x  Gluc: x / Ketone: Trace  / Bili: Negative / Urobili: Negative   Blood: x / Protein: Trace / Nitrite: Negative   Leuk Esterase: Negative / RBC: 1 /hpf / WBC 1 /hpf   Sq Epi: x / Non Sq Epi: 0 /hpf / Bacteria: Negative      CAPILLARY BLOOD GLUCOSE      POCT Blood Glucose.: 197 mg/dL (18 Oct 2018 16:57)  POCT Blood Glucose.: 200 mg/dL (18 Oct 2018 12:12)  POCT Blood Glucose.: 199 mg/dL (18 Oct 2018 07:51)  POCT Blood Glucose.: 189 mg/dL (17 Oct 2018 21:49)        Urinalysis Basic - ( 18 Oct 2018 08:50 )    Color: Light Yellow / Appearance: Clear / S.031 / pH: x  Gluc: x / Ketone: Trace  / Bili: Negative / Urobili: Negative   Blood: x / Protein: Trace / Nitrite: Negative   Leuk Esterase: Negative / RBC: 1 /hpf / WBC 1 /hpf   Sq Epi: x / Non Sq Epi: 0 /hpf / Bacteria: Negative      REVIEW OF SYSTEMS:  CONSTITUTIONAL: No fever, weight loss, or fatigue  EYES: No eye pain, visual disturbances, or discharge  ENMT:  No difficulty hearing, tinnitus, vertigo; No sinus or throat pain  RESPIRATORY: No cough, wheezing, chills or hemoptysis; No shortness of breath  CARDIOVASCULAR:  chest pain, palpitations, dizziness, or leg swelling  GASTROINTESTINAL: No abdominal or epigastric pain. No nausea, vomiting, or hematemesis; No diarrhea or constipation. No melena or hematochezia.  GENITOURINARY: No dysuria, frequency, hematuria, or incontinence  NEUROLOGICAL: No headaches, memory loss, loss of strength, numbness, or tremors      Consultant(s) Notes Reviewed:  [x ] YES  [ ] NO    PHYSICAL EXAM:  GENERAL: NAD, well-groomed, well-developed,not in any distress ,  HEAD:  Atraumatic, Normocephalic  EYES: EOMI, PERRLA, conjunctiva and sclera clear  NECK: Supple, No JVD, Normal thyroid  NERVOUS SYSTEM:  Alert & Oriented X3, No focal deficit   CHEST/LUNG: Good air entry bilateral with no  rales, rhonchi, wheezing, or rubs  HEART: Regular rate and rhythm; No murmurs, rubs, or gallops  ABDOMEN: Soft, Nontender, Nondistended; Bowel sounds present  EXTREMITIES:  2+ Peripheral Pulses, No clubbing, cyanosis, or edema  left shoulder and left arm mild tenderness     Care Discussed with Consultants/Other Providers [ x] YES  [ ] NO

## 2018-10-18 NOTE — H&P ADULT - PMH
CAD (coronary artery disease)    Diabetes mellitus type 2 in nonobese    Diabetes, type I    Umbilical hernia CAD (coronary artery disease)    Diabetes mellitus type 2 in nonobese    Umbilical hernia

## 2018-10-18 NOTE — CONSULT NOTE ADULT - PROBLEM SELECTOR RECOMMENDATION 9
Will start Lantus 8 units at bed time.  Will start Humalog 6 units before each meal in addition to Humalog correction scale coverage.  Patient counseled for compliance with consistent low carb diet.

## 2018-10-18 NOTE — H&P ADULT - NSHPREVIEWOFSYSTEMS_GEN_ALL_CORE
Review of Systems  GENERAL:  Fevers[] chills[] sweats[] fatigue[] weight loss[] weight gain []                                        NEURO:  parathesias[] seizures []  syncope []  confusion []                                                                                  EYES: glasses[]  blurry vision[]  discharge[] pain[] glaucoma []                                                                            ENMT:  difficulty hearing []  vertigo[]  dysphagia[] epistaxis[] recent dental work []                                      CV:  chest pain[] palpitations[] HESS [] diaphoresis [] edema[]                                                                                             RESPIRATORY:  wheezing[] SOB[] cough [] sputum[] hemoptysis[]                                                                    GI:  nausea[]  vomiting []  diarrhea[] constipation [] melena []                                                                        : hematuria[ ]  dysuria[ ] urgency[] incontinence[]                                                                                              MUSCULOSKELETAL:  arthritis[ ]  joint swelling [ ] muscle weakness [ ]                                                                  SKIN/BREAST:  rash[ ] itching [ ]  hair loss[ ] masses[ ]                                                                                                PSYCH:  dementia [ ] depression [ ] anxiety[ ]                                                                                                                  HEME/LYMPH:  bruises easily[ ] enlarged lymph nodes[ ] tender lymph nodes[ ]                                                 ENDOCRINE:  cold intolerance[ ] heat intolerance[ ] polydipsia[ ] Review of Systems  GENERAL:  Fevers[] chills[] sweats[] fatigue[] weight loss[] weight gain []                                        NEURO:  parathesias[x] seizures []  syncope []  confusion []                                                                                  EYES: glasses[]  blurry vision[]  discharge[] pain[] glaucoma []                                                                            ENMT:  difficulty hearing []  vertigo[]  dysphagia[] epistaxis[] recent dental work []                                      CV:  chest pain[x] palpitations[] HESS [] diaphoresis [] edema[]                                                                                             RESPIRATORY:  wheezing[] SOB[] cough [] sputum[] hemoptysis[]                                                                    GI:  nausea[]  vomiting []  diarrhea[] constipation [] melena []                                                                        : hematuria[ ]  dysuria[ ] urgency[] incontinence[]                                                                                              MUSCULOSKELETAL:  arthritis[ ]  joint swelling [ ] muscle weakness [ ]                                                                  SKIN/BREAST:  rash[ ] itching [ ]  hair loss[ ] masses[ ]                                                                                                PSYCH:  dementia [ ] depression [ ] anxiety[ ]                                                                                                                  HEME/LYMPH:  bruises easily[ ] enlarged lymph nodes[ ] tender lymph nodes[ ]                                                 ENDOCRINE:  cold intolerance[ ] heat intolerance[ ] polydipsia[ ]

## 2018-10-18 NOTE — CONSULT NOTE ADULT - ASSESSMENT
Assessment  DMT2: 58y Male with DM T2 with hyperglycemia on insulin coverage, blood sugars running high, no hypoglycemic episode,  eating meals,  non compliant with low carb diet.  CAD: Planing surgery, on medications, stable, monitored.  HTN: Controlled, On med.  HLD: On statin

## 2018-10-18 NOTE — H&P ADULT - HISTORY OF PRESENT ILLNESS
History of Present Illness:    58y Male History of Present Illness:    58y Male 57 y/o M smoker PMHx DM, hernia repair p/w chest pain x 8 hrs.  Pt notes after waking up 8 hrs, pt tried to get up out of bed and noticed left upper chest pain radiating to left upper back and shoulder described as moderate to severe aching pain.     Patient was consulted by neurology for left arm and shoulder pain with associated numbness and tingling and weakness in his right arm. CT Head negative for any acute infarct.  Per neuro: DDX: brachial plexus stretch injury vs rotator cuff injury with nerve impingement. Would recommend PT and OT and complete cardiac workup and continue pain control as needed with NSAIDS and if no improvement in the next 2-3 weeks will perform outpatient EMG at [phone].    EP was consulted for occasional palpitations.  Recommended Outpatient event monitor.     Patient was initially stayed in CDU for stress test which showed * Myocardial Perfusion SPECT results are abnormal at 90 % of MPHR. * There are medium sized, moderate defects in inferior and inferoseptal walls that are reversible, suggestive of ischemia. * Post-stress gated wall motion analysis was performed (LVEF = 52 %;LVEDV = 62 ml.), revealing mild hypokinesis of the inferior wall. RV function appeared normal.  RV function appeared normal. *** No previous Nuclear/Stress exam. Patient was admitted for abnormal stress test and underwent cardiac cath which showed: pLAD 70 with +IFR 0.89, OM1 90, m/dRCA 70; RFA access . Sheath removed by 21:30. RFA site appears stable. No signs of bleeding or hematoma. Good pedal pulse.  It was recommended that Heparin gtt to be started 6 hours s/p sheath removal. Patient to be transferred [facility] for CABG eval with Dr. Baldwin. Discussed with Dr. Hoffman History of Present Illness:    58y Male WITH PMHx of current smoker, DM, Rt inguinal hernia repair and paraumbilical hernia repair who presented to [facility] ER with c/o chest pain x 8 hrs.  Pt notes after waking up 8 hrs, pt tried to get up out of bed and noticed left upper chest pain radiating to left upper back and shoulder described as moderate to severe aching pain. Patient was consulted by neurology for left arm and shoulder pain with associated numbness and tingling and weakness in his left arm. CT Head negative for any acute infarct.  Per neuro: DDX: brachial plexus stretch injury vs rotator cuff injury with nerve impingement. Would recommend PT and OT and complete cardiac workup and continue pain control as needed with NSAIDS and if no improvement in the next 2-3 weeks will perform outpatient EMG at [phone].     Patient was initially stayed in CDU for stress test which showed * Myocardial Perfusion SPECT results are abnormal at 90 % of MPHR. * There are medium sized, moderate defects in inferior and inferoseptal walls that are reversible, suggestive of ischemia. * Post-stress gated wall motion analysis was performed (LVEF = 52 %;LVEDV = 62 ml.), revealing mild hypokinesis of the inferior wall. RV function appeared normal.  RV function appeared normal. *** No previous Nuclear/Stress exam. Patient was admitted for abnormal stress test and underwent cardiac cath which showed: pLAD 70 with +IFR 0.89, OM1 90, m/dRCA 70; RFA access . Sheath removed by 21:30. RFA site appears stable. No signs of bleeding or hematoma. Good pedal pulse.  It was recommended that Heparin gtt to be started 6 hours s/p sheath removal and transferred to  [facility] for CABG eval with Dr. Baldwin.

## 2018-10-18 NOTE — CONSULT NOTE ADULT - SUBJECTIVE AND OBJECTIVE BOX
HPI:  History of Present Illness:    58y Male WITH PMHx of current smoker, DM, Rt inguinal hernia repair and paraumbilical hernia repair who presented to [facility] ER with c/o chest pain x 8 hrs.  Pt notes after waking up 8 hrs, pt tried to get up out of bed and noticed left upper chest pain radiating to left upper back and shoulder described as moderate to severe aching pain. Patient was consulted by neurology for left arm and shoulder pain with associated numbness and tingling and weakness in his left arm. CT Head negative for any acute infarct.  Per neuro: DDX: brachial plexus stretch injury vs rotator cuff injury with nerve impingement. Would recommend PT and OT and complete cardiac workup and continue pain control as needed with NSAIDS and if no improvement in the next 2-3 weeks will perform outpatient EMG at [phone].     Patient was initially stayed in CDU for stress test which showed * Myocardial Perfusion SPECT results are abnormal at 90 % of MPHR. * There are medium sized, moderate defects in inferior and inferoseptal walls that are reversible, suggestive of ischemia. * Post-stress gated wall motion analysis was performed (LVEF = 52 %;LVEDV = 62 ml.), revealing mild hypokinesis of the inferior wall. RV function appeared normal.  RV function appeared normal. *** No previous Nuclear/Stress exam. Patient was admitted for abnormal stress test and underwent cardiac cath which showed: pLAD 70 with +IFR 0.89, OM1 90, m/dRCA 70; RFA access . Sheath removed by 21:30. RFA site appears stable. No signs of bleeding or hematoma. Good pedal pulse.  It was recommended that Heparin gtt to be started 6 hours s/p sheath removal and transferred to  [facility] for CABG eval with Dr. Baldwin. (18 Oct 2018 00:43)  Patient has history of diabetes, on oral meds at home,  no recent hypoglycemic episodes, no polyuria polydipsia. Patient follows up with PCP for diabetes management.    PAST MEDICAL & SURGICAL HISTORY:  CAD (coronary artery disease)  Umbilical hernia  Diabetes mellitus type 2 in nonobese  Paraumbilical hernia      FAMILY HISTORY:  Family history of coronary artery disease (Father, Mother)      Social History:    Outpatient Medications:    MEDICATIONS  (STANDING):  aspirin enteric coated 81 milliGRAM(s) Oral daily  atorvastatin 40 milliGRAM(s) Oral at bedtime  dextrose 5%. 1000 milliLiter(s) (50 mL/Hr) IV Continuous <Continuous>  dextrose 50% Injectable 12.5 Gram(s) IV Push once  dextrose 50% Injectable 25 Gram(s) IV Push once  dextrose 50% Injectable 25 Gram(s) IV Push once  heparin  Infusion 1000 Unit(s)/Hr (11 mL/Hr) IV Continuous <Continuous>  insulin glargine Injectable (LANTUS) 8 Unit(s) SubCutaneous at bedtime  insulin lispro (HumaLOG) corrective regimen sliding scale   SubCutaneous Before meals and at bedtime  insulin lispro Injectable (HumaLOG) 6 Unit(s) SubCutaneous three times a day before meals  metoprolol tartrate 12.5 milliGRAM(s) Oral two times a day  sodium chloride 0.9% lock flush 3 milliLiter(s) IV Push every 8 hours    MEDICATIONS  (PRN):  acetaminophen   Tablet .. 650 milliGRAM(s) Oral every 6 hours PRN Mild Pain (1 - 3)  dextrose 40% Gel 15 Gram(s) Oral once PRN Blood Glucose LESS THAN 70 milliGRAM(s)/deciLiter  glucagon  Injectable 1 milliGRAM(s) IntraMuscular once PRN Glucose <70 milliGRAM(s)/deciLiter  oxyCODONE    5 mG/acetaminophen 325 mG 2 Tablet(s) Oral every 6 hours PRN Severe Pain (7 - 10)  oxyCODONE    5 mG/acetaminophen 325 mG 1 Tablet(s) Oral every 4 hours PRN Moderate Pain (4 - 6)      Allergies    No Known Allergies    Intolerances      Review of Systems:  Constitutional: No fever, no chills  Eyes: No blurry vision  Neuro: No tremors  HEENT: No pain, no neck swelling  Cardiovascular: No chest pain, no palpitations  Respiratory: Has SOB, no cough  GI: No nausea, vomiting, abdominal pain  : No dysuria  Skin: no rash  MSK: Has leg swelling.  Psych: no depression  Endocrine: no polyuria, polydipsia    ALL OTHER SYSTEMS REVIEWED AND NEGATIVE    UNABLE TO OBTAIN    PHYSICAL EXAM:  VITALS: T(C): 37.2 (10-18-18 @ 20:07)  T(F): 98.9 (10-18-18 @ 20:07), Max: 98.9 (10-18-18 @ 20:07)  HR: 85 (10-18-18 @ 20:07) (67 - 90)  BP: 130/79 (10-18-18 @ 20:07) (121/90 - 162/109)  RR:  (16 - 18)  SpO2:  (95% - 98%)  Wt(kg): --  GENERAL: NAD, well-groomed, well-developed  EYES: No proptosis, no lid lag  HEENT:  Atraumatic, Normocephalic  THYROID: Normal size, no palpable nodules  RESPIRATORY: Clear to auscultation bilaterally; No rales, rhonchi, wheezing  CARDIOVASCULAR: Si S2, No murmurs;  GI: Soft, non distended, normal bowel sounds  SKIN: Dry, intact, No rashes or lesions  MUSCULOSKELETAL: Has BL lower extremity edema.  NEURO:  no tremor, sensation decreased in feet BL,    POCT Blood Glucose.: 198 mg/dL (10-18-18 @ 21:53)  POCT Blood Glucose.: 197 mg/dL (10-18-18 @ 16:57)  POCT Blood Glucose.: 200 mg/dL (10-18-18 @ 12:12)  POCT Blood Glucose.: 199 mg/dL (10-18-18 @ 07:51)  POCT Blood Glucose.: 189 mg/dL (10-17-18 @ 21:49)  POCT Blood Glucose.: 219 mg/dL (10-17-18 @ 17:43)  POCT Blood Glucose.: 223 mg/dL (10-17-18 @ 15:52)  POCT Blood Glucose.: 219 mg/dL (10-17-18 @ 12:32)  POCT Blood Glucose.: 220 mg/dL (10-16-18 @ 17:20)  POCT Blood Glucose.: 178 mg/dL (10-16-18 @ 13:28)                            15.8   6.7   )-----------( 261      ( 18 Oct 2018 16:03 )             46.4       10-18    138  |  103  |  13  ----------------------------<  177<H>  3.8   |  20<L>  |  0.85    EGFR if : 112  EGFR if non : 97    Ca    8.6      10-18    TPro  7.1  /  Alb  3.8  /  TBili  0.5  /  DBili  x   /  AST  47<H>  /  ALT  60<H>  /  AlkPhos  77  10-18      Thyroid Function Tests:  10-18 @ 05:38 TSH 4.71 FreeT4 -- T3 125 Anti TPO -- Anti Thyroglobulin Ab -- TSI --      Hemoglobin A1C, Whole Blood: 9.0 % <H> [4.0 - 5.6] (10-16-18 @ 07:38)          Radiology:

## 2018-10-18 NOTE — H&P ADULT - NSHPPHYSICALEXAM_GEN_ALL_CORE
PHYSICAL EXAM  Vital Signs Last 24 Hrs  T(C): 36.9 (17 Oct 2018 23:30), Max: 37.2 (17 Oct 2018 20:25)  T(F): 98.5 (17 Oct 2018 23:30), Max: 99 (17 Oct 2018 20:25)  HR: 74 (17 Oct 2018 23:30) (74 - 94)  BP: 146/95 (17 Oct 2018 23:30) (122/69 - 147/101)  BP(mean): --  RR: 16 (17 Oct 2018 23:30) (16 - 20)  SpO2: 98% (17 Oct 2018 23:30) (95% - 100%)    General: Well nourished, well developed, no acute distress.                                                         Neuro: Normal exam oriented to person/place & time with no focal motor or sensory  deficits.                    Eyes: Normal exam of conjunctiva & lids, pupils equally reactive.   ENT: Normal exam of nasal/oral mucosa with absence of cyanosis.   Neck: Normal exam of jugular veins, trachea & thyroid.   Chest: Normal lung exam with good air movement absence of wheezes, rales, or rhonchi:                                                                          CV:  Auscultation: normal [ ] S3[ ] S4[ ] Irregular [ ] Rub[ ] Clicks[ ]  Murmurs none:[ ]systolic [ ]  diastolic [ ] holosystolic [ ]  Carotids: No Bruits[ ] Other____________ Abdominal Aorta: normal [ ] nonpalpable[ ]                                                                         GI: Normal exam of abdomen, liver & spleen with no noted masses or tenderness.  (+) BS X 4 Quadrants, Nontender / Non Distended.              Extremities: Normal no evidence of cyanosis or deformity Edema: none[ ]trace[ ]1+[ ]2+[ ]3+[ ]4+[ ]  Lower Extremity Pulses: Right[ ] Left[ ]Varicosities[ ]  SKIN : Normal exam to inspection & palpation. PHYSICAL EXAM  Vital Signs Last 24 Hrs  T(C): 36.9 (17 Oct 2018 23:30), Max: 37.2 (17 Oct 2018 20:25)  T(F): 98.5 (17 Oct 2018 23:30), Max: 99 (17 Oct 2018 20:25)  HR: 74 (17 Oct 2018 23:30) (74 - 94)  BP: 146/95 (17 Oct 2018 23:30) (122/69 - 147/101)  BP(mean): --  RR: 16 (17 Oct 2018 23:30) (16 - 20)  SpO2: 98% (17 Oct 2018 23:30) (95% - 100%)    General: Well nourished, well developed, no acute distress.                                                         Neuro: Normal exam oriented to person/place & time with no focal motor or sensory  deficits.                    Eyes: Normal exam of conjunctiva & lids, pupils equally reactive.   ENT: Normal exam of nasal/oral mucosa with absence of cyanosis.   Neck: Normal exam of jugular veins, trachea & thyroid.   Chest: Normal lung exam with good air movement absence of wheezes, rales, or rhonchi:                                                                          CV:  Auscultation: normal [X] S3[ ] S4[ ] Irregular [ ] Rub[ ] Clicks[ ]  Murmurs none:[X ]systolic [ ]  diastolic [ ] holosystolic [ ]  Carotids: No Bruits[X] Other____________ Abdominal Aorta: normal [X] nonpalpable[ X]                                                                         GI: Normal exam of abdomen, liver & spleen with no noted masses or tenderness.  (+) BS X 4 Quadrants, Nontender / Non Distended.              Extremities: Normal no evidence of cyanosis or deformity Edema: none[X ]trace[ ]1+[ ]2+[ ]3+[ ]4+[ ]  Lower Extremity Pulses: Right[ +2] Left[+2 ]Varicosities[- ]  SKIN : Normal exam to inspection & palpation.

## 2018-10-18 NOTE — H&P ADULT - NSHPSOCIALHISTORY_GEN_ALL_CORE
SOCIAL HISTORY:  Smoker: [X ] Yes  [ ] No        PACK YEARS: 30 pk/yr                        WHEN QUIT?  ETOH use: [ ] Yes  [X] No              FREQUENCY / QUANTITY:  Ilicit Drug use:  [ ] Yes  [X] No  Occupation:    Live with: Significant other   Assist device use: Denies

## 2018-10-18 NOTE — H&P ADULT - NSHPLABSRESULTS_GEN_ALL_CORE
LABS:                        14.5   5.56  )-----------( 252      ( 17 Oct 2018 16:30 )             43.1     10-17    136  |  98  |  17  ----------------------------<  223<H>  3.7   |  20<L>  |  1.06    Ca    8.9      17 Oct 2018 16:30    TPro  7.3  /  Alb  4.2  /  TBili  0.4  /  DBili  x   /  AST  39  /  ALT  56<H>  /  AlkPhos  116  10-16    PT/INR - ( 16 Oct 2018 07:30 )   PT: 10.9 SEC;   INR: 0.95          PTT - ( 16 Oct 2018 07:30 )  PTT:33.4 SEC

## 2018-10-18 NOTE — PROGRESS NOTE ADULT - SUBJECTIVE AND OBJECTIVE BOX
SUBJECTIVE: Patient with no anginal chest pain or shortness of breath        MEDICATIONS  (STANDING):  aspirin enteric coated 81 milliGRAM(s) Oral daily  atorvastatin 40 milliGRAM(s) Oral at bedtime  dextrose 5%. 1000 milliLiter(s) (50 mL/Hr) IV Continuous <Continuous>  dextrose 50% Injectable 12.5 Gram(s) IV Push once  dextrose 50% Injectable 25 Gram(s) IV Push once  dextrose 50% Injectable 25 Gram(s) IV Push once  heparin  Infusion 1000 Unit(s)/Hr (11 mL/Hr) IV Continuous <Continuous>  insulin glargine Injectable (LANTUS) 8 Unit(s) SubCutaneous at bedtime  insulin lispro (HumaLOG) corrective regimen sliding scale   SubCutaneous Before meals and at bedtime  insulin lispro Injectable (HumaLOG) 6 Unit(s) SubCutaneous three times a day before meals  metoprolol tartrate 12.5 milliGRAM(s) Oral two times a day  sodium chloride 0.9% lock flush 3 milliLiter(s) IV Push every 8 hours    MEDICATIONS  (PRN):  acetaminophen   Tablet .. 650 milliGRAM(s) Oral every 6 hours PRN Mild Pain (1 - 3)  dextrose 40% Gel 15 Gram(s) Oral once PRN Blood Glucose LESS THAN 70 milliGRAM(s)/deciLiter  glucagon  Injectable 1 milliGRAM(s) IntraMuscular once PRN Glucose <70 milliGRAM(s)/deciLiter  oxyCODONE    5 mG/acetaminophen 325 mG 2 Tablet(s) Oral every 6 hours PRN Severe Pain (7 - 10)  oxyCODONE    5 mG/acetaminophen 325 mG 1 Tablet(s) Oral every 4 hours PRN Moderate Pain (4 - 6)      LABS:                        14.8   5.5   )-----------( 250      ( 18 Oct 2018 02:30 )             42.8       10-18    138  |  103  |  13  ----------------------------<  177<H>  3.8   |  20<L>  |  0.85    Ca    8.6      18 Oct 2018 02:30    TPro  7.1  /  Alb  3.8  /  TBili  0.5  /  DBili  x   /  AST  47<H>  /  ALT  60<H>  /  AlkPhos  77  10-18      PT/INR - ( 18 Oct 2018 02:30 )   PT: 11.8 sec;   INR: 1.09 ratio         PTT - ( 18 Oct 2018 02:30 )  PTT:30.0 sec      Serum Pro-Brain Natriuretic Peptide: 63 pg/mL (10-18 @ 02:30)      PHYSICAL EXAM:  Vital Signs Last 24 Hrs  T(C): 36.6 (18 Oct 2018 12:20), Max: 37.2 (17 Oct 2018 20:25)  T(F): 97.8 (18 Oct 2018 12:20), Max: 99 (17 Oct 2018 20:25)  HR: 90 (18 Oct 2018 12:20) (67 - 90)  BP: 121/90 (18 Oct 2018 12:20) (121/90 - 162/109)  BP(mean): --  RR: 18 (18 Oct 2018 12:20) (16 - 18)  SpO2: 98% (18 Oct 2018 12:20) (95% - 98%)    HEENT: Normal Oral mucosa, PERRL, EOMI  Lymphatic: No obvious lymphadenopathy, No edema  Cardiovascular: Normal S1S2, No JVD, 1/6 LOS, Peripheral pulses palpable 2+ B/L  Respiratory: Lungs clear to auscultation, normal effort  Gastrointestinal: Abdomen soft, ND, NT, +BS  Skin: Warm, dry, intact. No cyanosis, No rash.  Musculoskeletal: Normal ROM, normal strength  Psychiatric: Appropriate Mood and Affect      DIAGNOSTIC DATA  TELEMETRY: nsr 70-80    RADIOLOGY:   < from: Xray Chest 2 Views PA/Lat (10.18.18 @ 11:56) >  IMPRESSION:   Clear lungs.         ASSESSMENT AND PLAN:  58yMale with history of DM, Tobacco abuse, FHx of CAD admitted with chest pain and abnormal NST s/p cath found to have 3VD    -- sent to [facility] for CTS evaluation  -- currently preop testing underway for CABG  -- cw hep gtt for now  -- HD Stable no evidence CHF  -- care per CTS

## 2018-10-18 NOTE — PROGRESS NOTE ADULT - SUBJECTIVE AND OBJECTIVE BOX
Subjective: no chest pain; complains of muscle cramps and pain in left arm   	  MEDICATIONS:  MEDICATIONS  (STANDING):  aspirin enteric coated 81 milliGRAM(s) Oral daily  atorvastatin 40 milliGRAM(s) Oral at bedtime  dextrose 5%. 1000 milliLiter(s) (50 mL/Hr) IV Continuous <Continuous>  dextrose 50% Injectable 12.5 Gram(s) IV Push once  dextrose 50% Injectable 25 Gram(s) IV Push once  dextrose 50% Injectable 25 Gram(s) IV Push once  heparin  Infusion 1000 Unit(s)/Hr (11 mL/Hr) IV Continuous <Continuous>  insulin glargine Injectable (LANTUS) 8 Unit(s) SubCutaneous at bedtime  insulin lispro (HumaLOG) corrective regimen sliding scale   SubCutaneous Before meals and at bedtime  insulin lispro Injectable (HumaLOG) 6 Unit(s) SubCutaneous three times a day before meals  metoprolol tartrate 12.5 milliGRAM(s) Oral two times a day  sodium chloride 0.9% lock flush 3 milliLiter(s) IV Push every 8 hours      LABS:	 	    CARDIAC MARKERS:                                15.8   6.7   )-----------( 261      ( 18 Oct 2018 16:03 )             46.4     10-18    138  |  103  |  13  ----------------------------<  177<H>  3.8   |  20<L>  |  0.85    Ca    8.6      18 Oct 2018 02:30    TPro  7.1  /  Alb  3.8  /  TBili  0.5  /  DBili  x   /  AST  47<H>  /  ALT  60<H>  /  AlkPhos  77  10-18    proBNP: Serum Pro-Brain Natriuretic Peptide: 63 pg/mL (10-18 @ 02:30)    Lipid Profile:   HgA1c:   TSH: Thyroid Stimulating Hormone, Serum: 4.71 uIU/mL (10-18 @ 05:38)        PHYSICAL EXAM:  T(C): 36.6 (10-18-18 @ 12:20), Max: 37.2 (10-17-18 @ 20:25)  HR: 79 (10-18-18 @ 17:08) (67 - 90)  BP: 142/97 (10-18-18 @ 17:08) (121/90 - 162/109)  RR: 18 (10-18-18 @ 12:20) (16 - 18)  SpO2: 98% (10-18-18 @ 12:20) (95% - 98%)  Wt(kg): --  I&O's Summary    17 Oct 2018 07:01  -  18 Oct 2018 07:00  --------------------------------------------------------  IN: 235 mL / OUT: 0 mL / NET: 235 mL    18 Oct 2018 07:01  -  18 Oct 2018 19:44  --------------------------------------------------------  IN: 460 mL / OUT: 500 mL / NET: -40 mL      Height (cm): 167.64 (10-18 @ 00:05), 167.64 (10-17 @ 20:25)  Weight (kg): 72 (10-18 @ 00:05), 78.6 (10-17 @ 20:25)  BMI (kg/m2): 25.6 (10-18 @ 00:05), 28 (10-17 @ 20:25)  BSA (m2): 1.81 (10-18 @ 00:05), 1.88 (10-17 @ 20:25)    Appearance: Normal	  HEENT:   Normal oral mucosa, PERRL, EOMI	  Lymphatic: No lymphadenopathy , no edema  Cardiovascular: Normal S1 S2, No JVD, No murmurs , Peripheral pulses palpable 2+ bilaterally  Respiratory: Lungs clear to auscultation, normal effort 	  Gastrointestinal:  Soft, Non-tender, + BS	  Skin: No rashes, No ecchymoses, No cyanosis, warm to touch  Musculoskeletal: Normal range of motion, normal strength  Psychiatry:  Mood & affect appropriate    TELEMETRY: SR	    ECG:  	  RADIOLOGY:   DIAGNOSTIC TESTING:  [ ] Echocardiogram:  [ ]  Catheterization:  [ ] Stress Test:    OTHER: 	      ASSESSMENT/PLAN: 	58y Male with tobacco abuse, fhx of cad admitted with unstable angina 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   -Cath with severe stenosis in OM1, LAD (which was functionally significant by IFR of 0.89) and RCA  -CTS eval with Dr. Baldwin appreciated  -continue with asa and statin  -hep gtt  -cabg workup in progress    Linda Hoffman MD

## 2018-10-18 NOTE — PROGRESS NOTE ADULT - ASSESSMENT
58 year old male current smoker with family history of CAD with DM-II who presented to [facility] ED 10/17 complaining of chest pain/pressure with associated left arm numbness/weakness.  Denies dizziness, syncope, edema, orthopnea and PND.  CT head was performed showing no CVA and neurology was consulted who thought his symptoms were secondary to neuropathy.  Underwent a Cardiac work up, R/O MI, underwent Nuclear stress test revealing EF 52%, medium sized, moderate defects in inferior and inferoseptal walls that are reversible, suggestive of ischemia.   In light of patients cardiac risk factors, symptoms and abnormal noninvasive test findings there is high suspicion for CAD. Still have left shoulder pain with numbness left hand thumb and index finger.        Problem/Recommendation - 1:  Problem: Chest pain with Multivessel CAD . Recommendation: On Aspirin, BB, Statin and Heparin . Awaiting CABG . CTS and Cardiology helping.      Problem/Recommendation - 2:  ·  Problem: left Shoulder and arm pain with Numbness and tingling in left hand.  Recommendation: CT Head noted . Neurology help appreciated and outpt follow up.      Problem/Recommendation - 3:  ·  Problem: Diabetes mellitus type 2 in nonobese.  Recommendation: Holding PO meds . Lantus and SSI .     Problem/Recommendation - 4:  ·  Problem: HLD (hyperlipidemia).  Recommendation: Statin .

## 2018-10-19 LAB
ANION GAP SERPL CALC-SCNC: 14 MMOL/L — SIGNIFICANT CHANGE UP (ref 5–17)
APTT BLD: 68.6 SEC — HIGH (ref 27.5–37.4)
APTT BLD: 70.7 SEC — HIGH (ref 27.5–37.4)
BLD GP AB SCN SERPL QL: NEGATIVE — SIGNIFICANT CHANGE UP
BUN SERPL-MCNC: 12 MG/DL — SIGNIFICANT CHANGE UP (ref 7–23)
CALCIUM SERPL-MCNC: 9.1 MG/DL — SIGNIFICANT CHANGE UP (ref 8.4–10.5)
CHLORIDE SERPL-SCNC: 102 MMOL/L — SIGNIFICANT CHANGE UP (ref 96–108)
CO2 SERPL-SCNC: 21 MMOL/L — LOW (ref 22–31)
CREAT SERPL-MCNC: 0.91 MG/DL — SIGNIFICANT CHANGE UP (ref 0.5–1.3)
GLUCOSE SERPL-MCNC: 187 MG/DL — HIGH (ref 70–99)
HCT VFR BLD CALC: 46 % — SIGNIFICANT CHANGE UP (ref 39–50)
HGB BLD-MCNC: 15.5 G/DL — SIGNIFICANT CHANGE UP (ref 13–17)
MCHC RBC-ENTMCNC: 30.1 PG — SIGNIFICANT CHANGE UP (ref 27–34)
MCHC RBC-ENTMCNC: 33.7 GM/DL — SIGNIFICANT CHANGE UP (ref 32–36)
MCV RBC AUTO: 89.3 FL — SIGNIFICANT CHANGE UP (ref 80–100)
PLATELET # BLD AUTO: 280 K/UL — SIGNIFICANT CHANGE UP (ref 150–400)
POTASSIUM SERPL-MCNC: 4 MMOL/L — SIGNIFICANT CHANGE UP (ref 3.5–5.3)
POTASSIUM SERPL-SCNC: 4 MMOL/L — SIGNIFICANT CHANGE UP (ref 3.5–5.3)
RBC # BLD: 5.15 M/UL — SIGNIFICANT CHANGE UP (ref 4.2–5.8)
RBC # FLD: 12.6 % — SIGNIFICANT CHANGE UP (ref 10.3–14.5)
RH IG SCN BLD-IMP: POSITIVE — SIGNIFICANT CHANGE UP
SODIUM SERPL-SCNC: 137 MMOL/L — SIGNIFICANT CHANGE UP (ref 135–145)
URATE SERPL-MCNC: 5.7 MG/DL — SIGNIFICANT CHANGE UP (ref 3.4–8.8)
WBC # BLD: 7 K/UL — SIGNIFICANT CHANGE UP (ref 3.8–10.5)
WBC # FLD AUTO: 7 K/UL — SIGNIFICANT CHANGE UP (ref 3.8–10.5)

## 2018-10-19 RX ORDER — CEFUROXIME AXETIL 250 MG
1500 TABLET ORAL ONCE
Qty: 0 | Refills: 0 | Status: DISCONTINUED | OUTPATIENT
Start: 2018-10-19 | End: 2018-10-22

## 2018-10-19 RX ORDER — INSULIN GLARGINE 100 [IU]/ML
12 INJECTION, SOLUTION SUBCUTANEOUS AT BEDTIME
Qty: 0 | Refills: 0 | Status: DISCONTINUED | OUTPATIENT
Start: 2018-10-19 | End: 2018-10-20

## 2018-10-19 RX ORDER — METOPROLOL TARTRATE 50 MG
25 TABLET ORAL
Qty: 0 | Refills: 0 | Status: DISCONTINUED | OUTPATIENT
Start: 2018-10-19 | End: 2018-10-22

## 2018-10-19 RX ADMIN — OXYCODONE AND ACETAMINOPHEN 1 TABLET(S): 5; 325 TABLET ORAL at 16:47

## 2018-10-19 RX ADMIN — Medication 81 MILLIGRAM(S): at 12:13

## 2018-10-19 RX ADMIN — OXYCODONE AND ACETAMINOPHEN 1 TABLET(S): 5; 325 TABLET ORAL at 16:17

## 2018-10-19 RX ADMIN — OXYCODONE AND ACETAMINOPHEN 2 TABLET(S): 5; 325 TABLET ORAL at 20:50

## 2018-10-19 RX ADMIN — SODIUM CHLORIDE 3 MILLILITER(S): 9 INJECTION INTRAMUSCULAR; INTRAVENOUS; SUBCUTANEOUS at 14:28

## 2018-10-19 RX ADMIN — SODIUM CHLORIDE 3 MILLILITER(S): 9 INJECTION INTRAMUSCULAR; INTRAVENOUS; SUBCUTANEOUS at 05:41

## 2018-10-19 RX ADMIN — OXYCODONE AND ACETAMINOPHEN 1 TABLET(S): 5; 325 TABLET ORAL at 17:51

## 2018-10-19 RX ADMIN — Medication 2: at 17:13

## 2018-10-19 RX ADMIN — HEPARIN SODIUM 11 UNIT(S)/HR: 5000 INJECTION INTRAVENOUS; SUBCUTANEOUS at 07:56

## 2018-10-19 RX ADMIN — OXYCODONE AND ACETAMINOPHEN 2 TABLET(S): 5; 325 TABLET ORAL at 08:31

## 2018-10-19 RX ADMIN — Medication 25 MILLIGRAM(S): at 17:12

## 2018-10-19 RX ADMIN — Medication 6 UNIT(S): at 07:56

## 2018-10-19 RX ADMIN — Medication 6 UNIT(S): at 12:13

## 2018-10-19 RX ADMIN — ATORVASTATIN CALCIUM 40 MILLIGRAM(S): 80 TABLET, FILM COATED ORAL at 21:23

## 2018-10-19 RX ADMIN — INSULIN GLARGINE 12 UNIT(S): 100 INJECTION, SOLUTION SUBCUTANEOUS at 21:37

## 2018-10-19 RX ADMIN — HEPARIN SODIUM 11 UNIT(S)/HR: 5000 INJECTION INTRAVENOUS; SUBCUTANEOUS at 00:30

## 2018-10-19 RX ADMIN — OXYCODONE AND ACETAMINOPHEN 2 TABLET(S): 5; 325 TABLET ORAL at 20:19

## 2018-10-19 RX ADMIN — OXYCODONE AND ACETAMINOPHEN 1 TABLET(S): 5; 325 TABLET ORAL at 17:21

## 2018-10-19 RX ADMIN — Medication 4: at 07:56

## 2018-10-19 RX ADMIN — Medication 12.5 MILLIGRAM(S): at 05:42

## 2018-10-19 RX ADMIN — SODIUM CHLORIDE 3 MILLILITER(S): 9 INJECTION INTRAMUSCULAR; INTRAVENOUS; SUBCUTANEOUS at 21:24

## 2018-10-19 RX ADMIN — Medication 6 UNIT(S): at 17:13

## 2018-10-19 RX ADMIN — OXYCODONE AND ACETAMINOPHEN 2 TABLET(S): 5; 325 TABLET ORAL at 08:01

## 2018-10-19 NOTE — PROGRESS NOTE ADULT - SUBJECTIVE AND OBJECTIVE BOX
Subjective: no chest pain or sob; left arm pain improving   	  acetaminophen   Tablet .. 650 milliGRAM(s) Oral every 6 hours PRN  aspirin enteric coated 81 milliGRAM(s) Oral daily  atorvastatin 40 milliGRAM(s) Oral at bedtime  dextrose 40% Gel 15 Gram(s) Oral once PRN  dextrose 5%. 1000 milliLiter(s) IV Continuous <Continuous>  dextrose 50% Injectable 12.5 Gram(s) IV Push once  dextrose 50% Injectable 25 Gram(s) IV Push once  dextrose 50% Injectable 25 Gram(s) IV Push once  glucagon  Injectable 1 milliGRAM(s) IntraMuscular once PRN  heparin  Infusion 1000 Unit(s)/Hr IV Continuous <Continuous>  insulin glargine Injectable (LANTUS) 12 Unit(s) SubCutaneous at bedtime  insulin lispro (HumaLOG) corrective regimen sliding scale   SubCutaneous Before meals and at bedtime  insulin lispro Injectable (HumaLOG) 6 Unit(s) SubCutaneous three times a day before meals  metoprolol tartrate 12.5 milliGRAM(s) Oral two times a day  oxyCODONE    5 mG/acetaminophen 325 mG 2 Tablet(s) Oral every 6 hours PRN  oxyCODONE    5 mG/acetaminophen 325 mG 1 Tablet(s) Oral every 4 hours PRN  sodium chloride 0.9% lock flush 3 milliLiter(s) IV Push every 8 hours                            15.5   7.0   )-----------( 280      ( 19 Oct 2018 06:14 )             46.0       10-19    137  |  102  |  12  ----------------------------<  187<H>  4.0   |  21<L>  |  0.91    Ca    9.1      19 Oct 2018 06:14    TPro  7.1  /  Alb  3.8  /  TBili  0.5  /  DBili  x   /  AST  47<H>  /  ALT  60<H>  /  AlkPhos  77  10-18            T(C): 36.8 (10-19-18 @ 04:57), Max: 37.2 (10-18-18 @ 20:07)  HR: 77 (10-19-18 @ 04:57) (77 - 90)  BP: 124/73 (10-19-18 @ 04:57) (121/90 - 142/97)  RR: 18 (10-19-18 @ 04:57) (18 - 18)  SpO2: 94% (10-19-18 @ 04:57) (94% - 98%)  Wt(kg): --    I&O's Summary    18 Oct 2018 07:01  -  19 Oct 2018 07:00  --------------------------------------------------------  IN: 700 mL / OUT: 750 mL / NET: -50 mL        Appearance: Normal	  HEENT:   Normal oral mucosa, PERRL, EOMI	  Lymphatic: No lymphadenopathy , no edema  Cardiovascular: Normal S1 S2, No JVD, No murmurs , Peripheral pulses palpable 2+ bilaterally  Respiratory: Lungs clear to auscultation, normal effort 	  Gastrointestinal:  Soft, Non-tender, + BS	  Skin: No rashes, No ecchymoses, No cyanosis, warm to touch  Musculoskeletal: Normal range of motion, normal strength  Psychiatry:  Mood & affect appropriate    TELEMETRY: SR	    ECG:  	  RADIOLOGY:   DIAGNOSTIC TESTING:  [ ] Echocardiogram:   [ ]  Catheterization:  [ ] Stress Test:  < from: Transthoracic Echocardiogram (10.18.18 @ 12:46) >  Conclusions:  1. Normal left ventricular internal dimensions and wall  thicknesses.  2. Mild global left ventricular systolic dysfunction with  mild segmental abnormalities.  Mild hypokinesis of the base  to mid anteroseptal, septal walls.  3. Normal diastolic function  4. Normal right ventricular size and function.  *** No previous Echo exam.    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   -Cath with severe stenosis in OM1, calcified LAD (which was functionally significant by IFR of 0.89) and RCA  -continue with asa and statin  -hep gtt  -cabg workup in progress  -cabg tentatively for Monday per cts  -follow up ct surgery     Linda Hoffman MD

## 2018-10-19 NOTE — PROGRESS NOTE ADULT - SUBJECTIVE AND OBJECTIVE BOX
COVERING ENDOCRINE ATTENDING FOR DR. MANN    Chief complaint  Patient is a 58y old  Male who presents with a chief complaint of Cardiac Surgery evaluation (19 Oct 2018 05:21)   Review of systems  Patient in bed, looks comfortable, no fever, no hypoglycemia.    Labs and Fingersticks    CAPILLARY BLOOD GLUCOSE    Anion Gap, Serum: 14 (10-19 @ 06:14)  Anion Gap, Serum: 15 (10-18 @ 02:30)      Calcium, Total Serum: 9.1 (10-19 @ 06:14)  Calcium, Total Serum: 8.6 (10-18 @ 02:30)  Calcium, Total Serum: 8.9 (10-17 @ 16:30)  Albumin, Serum: 3.8 (10-18 @ 02:30)    Alanine Aminotransferase (ALT/SGPT): 60 <H> (10-18 @ 02:30)  Alkaline Phosphatase, Serum: 77 (10-18 @ 02:30)  Aspartate Aminotransferase (AST/SGOT): 47 <H> (10-18 @ 02:30)        10-19    137  |  102  |  12  ----------------------------<  187<H>  4.0   |  21<L>  |  0.91    Ca    9.1      19 Oct 2018 06:14    TPro  7.1  /  Alb  3.8  /  TBili  0.5  /  DBili  x   /  AST  47<H>  /  ALT  60<H>  /  AlkPhos  77  10-18                        15.5   7.0   )-----------( 280      ( 19 Oct 2018 06:14 )             46.0     Medications  MEDICATIONS  (STANDING):  aspirin enteric coated 81 milliGRAM(s) Oral daily  atorvastatin 40 milliGRAM(s) Oral at bedtime  dextrose 5%. 1000 milliLiter(s) (50 mL/Hr) IV Continuous <Continuous>  dextrose 50% Injectable 12.5 Gram(s) IV Push once  dextrose 50% Injectable 25 Gram(s) IV Push once  dextrose 50% Injectable 25 Gram(s) IV Push once  heparin  Infusion 1000 Unit(s)/Hr (11 mL/Hr) IV Continuous <Continuous>  insulin glargine Injectable (LANTUS) 12 Unit(s) SubCutaneous at bedtime  insulin lispro (HumaLOG) corrective regimen sliding scale   SubCutaneous Before meals and at bedtime  insulin lispro Injectable (HumaLOG) 6 Unit(s) SubCutaneous three times a day before meals  metoprolol tartrate 12.5 milliGRAM(s) Oral two times a day  sodium chloride 0.9% lock flush 3 milliLiter(s) IV Push every 8 hours      Physical Exam  General: Patient comfortable in bed  Vital Signs Last 12 Hrs  T(F): 98.3 (10-19-18 @ 04:57), Max: 98.9 (10-18-18 @ 20:07)  HR: 77 (10-19-18 @ 04:57) (77 - 85)  BP: 124/73 (10-19-18 @ 04:57) (124/73 - 130/79)  BP(mean): --  RR: 18 (10-19-18 @ 04:57) (18 - 18)  SpO2: 94% (10-19-18 @ 04:57) (94% - 95%)  Neck: No palpable thyroid nodules.  CVS: S1S2, No murmurs  Respiratory: No wheezing, no crepitations  GI: Abdomen soft, bowel sounds positive  Musculoskeletal: Positive edema lower extremities.   Skin: No skin rashes, no ecchymosis    Diagnostics    Free Thyroxine, Serum: AM Sched. Collection: 20-Oct-2018 06:00 (10-19 @ 06:47)

## 2018-10-19 NOTE — PROGRESS NOTE ADULT - SUBJECTIVE AND OBJECTIVE BOX
pt seen and examined, no complaints, ROS - .     tele stable, no events overnight     acetaminophen   Tablet .. 650 milliGRAM(s) Oral every 6 hours PRN  aspirin enteric coated 81 milliGRAM(s) Oral daily  atorvastatin 40 milliGRAM(s) Oral at bedtime  dextrose 40% Gel 15 Gram(s) Oral once PRN  dextrose 5%. 1000 milliLiter(s) IV Continuous <Continuous>  dextrose 50% Injectable 12.5 Gram(s) IV Push once  dextrose 50% Injectable 25 Gram(s) IV Push once  dextrose 50% Injectable 25 Gram(s) IV Push once  glucagon  Injectable 1 milliGRAM(s) IntraMuscular once PRN  heparin  Infusion 1000 Unit(s)/Hr IV Continuous <Continuous>  insulin glargine Injectable (LANTUS) 8 Unit(s) SubCutaneous at bedtime  insulin lispro (HumaLOG) corrective regimen sliding scale   SubCutaneous Before meals and at bedtime  insulin lispro Injectable (HumaLOG) 6 Unit(s) SubCutaneous three times a day before meals  metoprolol tartrate 12.5 milliGRAM(s) Oral two times a day  oxyCODONE    5 mG/acetaminophen 325 mG 2 Tablet(s) Oral every 6 hours PRN  oxyCODONE    5 mG/acetaminophen 325 mG 1 Tablet(s) Oral every 4 hours PRN  sodium chloride 0.9% lock flush 3 milliLiter(s) IV Push every 8 hours                            15.8   6.7   )-----------( 261      ( 18 Oct 2018 16:03 )             46.4       Hemoglobin: 15.8 g/dL (10-18 @ 16:03)  Hemoglobin: 14.8 g/dL (10-18 @ 02:30)  Hemoglobin: 14.5 g/dL (10-17 @ 16:30)  Hemoglobin: 14.4 g/dL (10-16 @ 07:30)      10-18    138  |  103  |  13  ----------------------------<  177<H>  3.8   |  20<L>  |  0.85    Ca    8.6      18 Oct 2018 02:30    TPro  7.1  /  Alb  3.8  /  TBili  0.5  /  DBili  x   /  AST  47<H>  /  ALT  60<H>  /  AlkPhos  77  10-18    Creatinine Trend: 0.85<--, 1.06<--, 0.91<--    COAGS: PT/INR - ( 18 Oct 2018 16:03 )   PT: 11.8 sec;   INR: 1.08 ratio         PTT - ( 19 Oct 2018 00:15 )  PTT:68.6 sec          T(C): 36.8 (10-19-18 @ 04:57), Max: 37.2 (10-18-18 @ 20:07)  HR: 77 (10-19-18 @ 04:57) (77 - 90)  BP: 124/73 (10-19-18 @ 04:57) (121/90 - 142/97)  RR: 18 (10-19-18 @ 04:57) (18 - 18)  SpO2: 94% (10-19-18 @ 04:57) (94% - 98%)  Wt(kg): --    I&O's Summary    17 Oct 2018 07:01  -  18 Oct 2018 07:00  --------------------------------------------------------  IN: 235 mL / OUT: 0 mL / NET: 235 mL    18 Oct 2018 07:01  -  19 Oct 2018 05:22  --------------------------------------------------------  IN: 700 mL / OUT: 750 mL / NET: -50 mL        Appearance: Normal	  HEENT:   Normal oral mucosa, PERRL, EOMI	  Lymphatic: No lymphadenopathy , no edema  Cardiovascular: Normal S1 S2, No JVD, No murmurs , Peripheral pulses palpable 2+ bilaterally  Respiratory: Lungs clear to auscultation, normal effort 	  Gastrointestinal:  Soft, Non-tender, + BS	  Skin: No rashes, No ecchymoses, No cyanosis, warm to touch  Musculoskeletal: Normal range of motion, normal strength  Psychiatry:  Mood & affect appropriate    TELEMETRY: SR	      DIAGNOSTIC TESTING:  [ ] Echocardiogram: < from: Transthoracic Echocardiogram (10.18.18 @ 12:46) >  Conclusions:  1. Normal left ventricular internal dimensions and wall  thicknesses.  2. Mild global left ventricular systolic dysfunction with  mild segmental abnormalities.  Mild hypokinesis of the base  to mid anteroseptal, septal walls.  3. Normal diastolic function  4. Normal right ventricular size and function.  *** No previous Echo exam.    < end of copied text >    [ ]  Catheterization:  -Cath with severe stenosis in OM1, LAD (which was functionally significant by IFR of 0.89) and RCA    [ ] Stress Test:  < from: Nuclear Stress Test-Exercise (10.17.18 @ 09:43) >  IMPRESSIONS:Abnormal Study  * Myocardial Perfusion SPECT results are abnormal at 90 %  of MPHR.  * There are medium sized, moderate defects in inferior and  inferoseptal walls that are reversible, suggestive of  ischemia.  * Post-stress gated wall motion analysis was performed  (LVEF = 52 %;LVEDV = 62 ml.), revealing mild hypokinesis  of the inferior wall. RV function appeared normal.  RV  function appeared normal.  *** No previous Nuclear/Stress exam.    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     - tele stable   - ASA, statin   - cts work up in progress. CABG pending   -hep gtt  -  GI / DVT prophylaxis,  keep K>4, mag >2.0   D/W Dr Gibson

## 2018-10-19 NOTE — PROGRESS NOTE ADULT - SUBJECTIVE AND OBJECTIVE BOX
I feel ok     VITAL SIGNS    Telemetry:  Sr     Vital Signs Last 24 Hrs  T(C): 36.8 (10-19-18 @ 04:57), Max: 37.2 (10-18-18 @ 20:07)  T(F): 98.3 (10-19-18 @ 04:57), Max: 98.9 (10-18-18 @ 20:07)  HR: 77 (10-19-18 @ 04:57) (77 - 85)  BP: 124/73 (10-19-18 @ 04:57) (124/73 - 142/97)  RR: 18 (10-19-18 @ 04:57) (18 - 18)  SpO2: 94% (10-19-18 @ 04:57) (94% - 95%)                   10-18 @ 07:01  -  10-19 @ 07:00  --------------------------------------------------------  IN: 700 mL / OUT: 750 mL / NET: -50 mL    10-19 @ 07:01  -  10-19 @ 13:29  --------------------------------------------------------  IN: 120 mL / OUT: 0 mL / NET: 120 mL          Daily     Daily Weight in k.8 (19 Oct 2018 07:15)        CAPILLARY BLOOD GLUCOSE      POCT Blood Glucose.: 124 mg/dL (19 Oct 2018 11:46)  POCT Blood Glucose.: 226 mg/dL (19 Oct 2018 07:44)  POCT Blood Glucose.: 198 mg/dL (18 Oct 2018 21:53)  POCT Blood Glucose.: 197 mg/dL (18 Oct 2018 16:57)          Coumadin    [ ] YES          [X  ]      NO         Reason:     Heparin                           PHYSICAL EXAM    Neurology: alert and oriented x 3, nonfocal, no gross deficits  CV : RRR No audible murmurs  Sternal Wound :  CDI , Stable  Lungs: CTA B/L   Abdomen: soft, nontender, nondistended, positive bowel sounds, last bowel movement   :      + urination        Extremities:     FROM X 4 NO E/C/C        acetaminophen   Tablet .. 650 milliGRAM(s) Oral every 6 hours PRN  aspirin enteric coated 81 milliGRAM(s) Oral daily  atorvastatin 40 milliGRAM(s) Oral at bedtime  cefuroxime  IVPB 1500 milliGRAM(s) IV Intermittent once  dextrose 40% Gel 15 Gram(s) Oral once PRN  dextrose 5%. 1000 milliLiter(s) IV Continuous <Continuous>  dextrose 50% Injectable 12.5 Gram(s) IV Push once  dextrose 50% Injectable 25 Gram(s) IV Push once  dextrose 50% Injectable 25 Gram(s) IV Push once  glucagon  Injectable 1 milliGRAM(s) IntraMuscular once PRN  heparin  Infusion 1000 Unit(s)/Hr IV Continuous <Continuous>  insulin glargine Injectable (LANTUS) 12 Unit(s) SubCutaneous at bedtime  insulin lispro (HumaLOG) corrective regimen sliding scale   SubCutaneous Before meals and at bedtime  insulin lispro Injectable (HumaLOG) 6 Unit(s) SubCutaneous three times a day before meals  metoprolol tartrate 12.5 milliGRAM(s) Oral two times a day  oxyCODONE    5 mG/acetaminophen 325 mG 2 Tablet(s) Oral every 6 hours PRN  oxyCODONE    5 mG/acetaminophen 325 mG 1 Tablet(s) Oral every 4 hours PRN  sodium chloride 0.9% lock flush 3 milliLiter(s) IV Push every 8 hours                      Discussed with Cardiothoracic Team at AM rounds.

## 2018-10-19 NOTE — PROGRESS NOTE ADULT - SUBJECTIVE AND OBJECTIVE BOX
INTERVAL HPI/OVERNIGHT EVENTS: I feel fine.  Vital Signs Last 24 Hrs  T(C): 36.8 (19 Oct 2018 04:57), Max: 37.2 (18 Oct 2018 20:07)  T(F): 98.3 (19 Oct 2018 04:57), Max: 98.9 (18 Oct 2018 20:07)  HR: 77 (19 Oct 2018 04:57) (77 - 90)  BP: 124/73 (19 Oct 2018 04:57) (121/90 - 142/97)  BP(mean): --  RR: 18 (19 Oct 2018 04:57) (18 - 18)  SpO2: 94% (19 Oct 2018 04:57) (94% - 98%)  I&O's Summary    18 Oct 2018 07:01  -  19 Oct 2018 07:00  --------------------------------------------------------  IN: 700 mL / OUT: 750 mL / NET: -50 mL    19 Oct 2018 07:01  -  19 Oct 2018 12:07  --------------------------------------------------------  IN: 120 mL / OUT: 0 mL / NET: 120 mL      MEDICATIONS  (STANDING):  aspirin enteric coated 81 milliGRAM(s) Oral daily  atorvastatin 40 milliGRAM(s) Oral at bedtime  dextrose 5%. 1000 milliLiter(s) (50 mL/Hr) IV Continuous <Continuous>  dextrose 50% Injectable 12.5 Gram(s) IV Push once  dextrose 50% Injectable 25 Gram(s) IV Push once  dextrose 50% Injectable 25 Gram(s) IV Push once  heparin  Infusion 1000 Unit(s)/Hr (11 mL/Hr) IV Continuous <Continuous>  insulin glargine Injectable (LANTUS) 12 Unit(s) SubCutaneous at bedtime  insulin lispro (HumaLOG) corrective regimen sliding scale   SubCutaneous Before meals and at bedtime  insulin lispro Injectable (HumaLOG) 6 Unit(s) SubCutaneous three times a day before meals  metoprolol tartrate 12.5 milliGRAM(s) Oral two times a day  sodium chloride 0.9% lock flush 3 milliLiter(s) IV Push every 8 hours    MEDICATIONS  (PRN):  acetaminophen   Tablet .. 650 milliGRAM(s) Oral every 6 hours PRN Mild Pain (1 - 3)  dextrose 40% Gel 15 Gram(s) Oral once PRN Blood Glucose LESS THAN 70 milliGRAM(s)/deciLiter  glucagon  Injectable 1 milliGRAM(s) IntraMuscular once PRN Glucose <70 milliGRAM(s)/deciLiter  oxyCODONE    5 mG/acetaminophen 325 mG 2 Tablet(s) Oral every 6 hours PRN Severe Pain (7 - 10)  oxyCODONE    5 mG/acetaminophen 325 mG 1 Tablet(s) Oral every 4 hours PRN Moderate Pain (4 - 6)    LABS:                        15.5   7.0   )-----------( 280      ( 19 Oct 2018 06:14 )             46.0     10    137  |  102  |  12  ----------------------------<  187<H>  4.0   |  21<L>  |  0.91    Ca    9.1      19 Oct 2018 06:14    TPro  7.1  /  Alb  3.8  /  TBili  0.5  /  DBili  x   /  AST  47<H>  /  ALT  60<H>  /  AlkPhos  77  10-18    PT/INR - ( 18 Oct 2018 16:03 )   PT: 11.8 sec;   INR: 1.08 ratio         PTT - ( 19 Oct 2018 06:14 )  PTT:70.7 sec  Urinalysis Basic - ( 18 Oct 2018 08:50 )    Color: Light Yellow / Appearance: Clear / S.031 / pH: x  Gluc: x / Ketone: Trace  / Bili: Negative / Urobili: Negative   Blood: x / Protein: Trace / Nitrite: Negative   Leuk Esterase: Negative / RBC: 1 /hpf / WBC 1 /hpf   Sq Epi: x / Non Sq Epi: 0 /hpf / Bacteria: Negative      CAPILLARY BLOOD GLUCOSE      POCT Blood Glucose.: 124 mg/dL (19 Oct 2018 11:46)  POCT Blood Glucose.: 226 mg/dL (19 Oct 2018 07:44)  POCT Blood Glucose.: 198 mg/dL (18 Oct 2018 21:53)  POCT Blood Glucose.: 197 mg/dL (18 Oct 2018 16:57)  POCT Blood Glucose.: 200 mg/dL (18 Oct 2018 12:12)        Urinalysis Basic - ( 18 Oct 2018 08:50 )    Color: Light Yellow / Appearance: Clear / S.031 / pH: x  Gluc: x / Ketone: Trace  / Bili: Negative / Urobili: Negative   Blood: x / Protein: Trace / Nitrite: Negative   Leuk Esterase: Negative / RBC: 1 /hpf / WBC 1 /hpf   Sq Epi: x / Non Sq Epi: 0 /hpf / Bacteria: Negative      REVIEW OF SYSTEMS:  CONSTITUTIONAL: No fever, weight loss, or fatigue  EYES: No eye pain, visual disturbances, or discharge  ENMT:  No difficulty hearing, tinnitus, vertigo; No sinus or throat pain  NECK: No pain or stiffness  RESPIRATORY: No cough, wheezing, chills or hemoptysis; No shortness of breath  CARDIOVASCULAR: No chest pain, palpitations, dizziness, or leg swelling  GASTROINTESTINAL: No abdominal or epigastric pain. No nausea, vomiting, or hematemesis; No diarrhea or constipation. No melena or hematochezia.  GENITOURINARY: No dysuria, frequency, hematuria, or incontinence  NEUROLOGICAL: No headaches, memory loss, loss of strength, numbness, or tremors      Consultant(s) Notes Reviewed:  [x ] YES  [ ] NO    PHYSICAL EXAM:  GENERAL: NAD, well-groomed, well-developed,not in any distress ,  HEAD:  Atraumatic, Normocephalic  EYES: EOMI, PERRLA, conjunctiva and sclera clear  ENMT: No tonsillar erythema, exudates, or enlargement; Moist mucous membranes, Good dentition, No lesions  NECK: Supple, No JVD, Normal thyroid  NERVOUS SYSTEM:  Alert & Oriented X3, No focal deficit   CHEST/LUNG: Good air entry bilateral with no  rales, rhonchi, wheezing, or rubs  HEART: Regular rate and rhythm; No murmurs, rubs, or gallops  ABDOMEN: Soft, Nontender, Nondistended; Bowel sounds present  EXTREMITIES:  2+ Peripheral Pulses, No clubbing, cyanosis, or edema  SKIN: No rashes or lesions    Care Discussed with Consultants/Other Providers [ x] YES  [ ] NO

## 2018-10-19 NOTE — PROGRESS NOTE ADULT - ASSESSMENT
58 year old male current smoker with family history of CAD with DM-II who presented to [facility] ED 10/17 complaining of chest pain/pressure with associated left arm numbness/weakness.  Denies dizziness, syncope, edema, orthopnea and PND.  CT head was performed showing no CVA and neurology was consulted who thought his symptoms were secondary to neuropathy.  Underwent a Cardiac work up, R/O MI, underwent Nuclear stress test revealing EF 52%, medium sized, moderate defects in inferior and inferoseptal walls that are reversible, suggestive of ischemia.   In light of patients cardiac risk factors, symptoms and abnormal noninvasive test findings there is high suspicion for CAD. Still have left shoulder pain with numbness left hand thumb and index finger.        Problem/Recommendation - 1:  Problem: Chest pain with Multivessel CAD . Recommendation: On Aspirin, BB, Statin and Heparin . Awaiting CABG . CTS and Cardiology helping.      Problem/Recommendation - 2:  ·  Problem: left Shoulder and arm pain with Numbness and tingling in left hand.  Recommendation: CT Head noted . Neurology help appreciated and outpt follow up. X Ray noted and tendinosis.      Problem/Recommendation - 3:  ·  Problem: Diabetes mellitus type 2 in nonobese.  Recommendation: Holding PO meds . Lantus and SSI .     Problem/Recommendation - 4:  ·  Problem: HLD (hyperlipidemia).  Recommendation: Statin .

## 2018-10-19 NOTE — PROGRESS NOTE ADULT - ASSESSMENT
Assessment  DMT2: 58y Male with DM T2 with hyperglycemia on insulin, dose adjusted, blood sugars improving, no hypoglycemic episode,  eating meals,  non compliant with low carb diet.  CAD: Planing surgery, on medications, stable, monitored.  HTN: Controlled, On med.  HLD: On statin

## 2018-10-19 NOTE — PROVIDER CONTACT NOTE (OTHER) - ASSESSMENT
Patient specific protocol
Pt A&OX4, VSS, NSR on tele, heparin gtt @ 11ml/hr, pt asymptomatic, no c/o CP or SOB, K 4.0, currently on 12.5 mg metoprolol BID

## 2018-10-19 NOTE — PROGRESS NOTE ADULT - SUBJECTIVE AND OBJECTIVE BOX
SUBJECTIVE: Patient with no anginal chest pain or shortness of breath. Patient states his main concern is his left arm( Shoulder) pain. States "Im not fixing the heart till they fix my arm"        MEDICATIONS  (STANDING):  aspirin enteric coated 81 milliGRAM(s) Oral daily  atorvastatin 40 milliGRAM(s) Oral at bedtime  dextrose 5%. 1000 milliLiter(s) (50 mL/Hr) IV Continuous <Continuous>  dextrose 50% Injectable 12.5 Gram(s) IV Push once  dextrose 50% Injectable 25 Gram(s) IV Push once  dextrose 50% Injectable 25 Gram(s) IV Push once  heparin  Infusion 1000 Unit(s)/Hr (11 mL/Hr) IV Continuous <Continuous>  insulin glargine Injectable (LANTUS) 12 Unit(s) SubCutaneous at bedtime  insulin lispro (HumaLOG) corrective regimen sliding scale   SubCutaneous Before meals and at bedtime  insulin lispro Injectable (HumaLOG) 6 Unit(s) SubCutaneous three times a day before meals  metoprolol tartrate 12.5 milliGRAM(s) Oral two times a day  sodium chloride 0.9% lock flush 3 milliLiter(s) IV Push every 8 hours    MEDICATIONS  (PRN):  acetaminophen   Tablet .. 650 milliGRAM(s) Oral every 6 hours PRN Mild Pain (1 - 3)  dextrose 40% Gel 15 Gram(s) Oral once PRN Blood Glucose LESS THAN 70 milliGRAM(s)/deciLiter  glucagon  Injectable 1 milliGRAM(s) IntraMuscular once PRN Glucose <70 milliGRAM(s)/deciLiter  oxyCODONE    5 mG/acetaminophen 325 mG 2 Tablet(s) Oral every 6 hours PRN Severe Pain (7 - 10)  oxyCODONE    5 mG/acetaminophen 325 mG 1 Tablet(s) Oral every 4 hours PRN Moderate Pain (4 - 6)      LABS:                        15.5   7.0   )-----------( 280      ( 19 Oct 2018 06:14 )             46.0     Hemoglobin: 15.5 g/dL (10-19 @ 06:14)  Hemoglobin: 15.8 g/dL (10-18 @ 16:03)  Hemoglobin: 14.8 g/dL (10-18 @ 02:30)  Hemoglobin: 14.5 g/dL (10-17 @ 16:30)  Hemoglobin: 14.4 g/dL (10-16 @ 07:30)    10-19    137  |  102  |  12  ----------------------------<  187<H>  4.0   |  21<L>  |  0.91    Ca    9.1      19 Oct 2018 06:14    TPro  7.1  /  Alb  3.8  /  TBili  0.5  /  DBili  x   /  AST  47<H>  /  ALT  60<H>  /  AlkPhos  77  10-18    Creatinine Trend: 0.91<--, 0.85<--, 1.06<--, 0.91<--   PT/INR - ( 18 Oct 2018 16:03 )   PT: 11.8 sec;   INR: 1.08 ratio         PTT - ( 19 Oct 2018 06:14 )  PTT:70.7 sec        PHYSICAL EXAM  Vital Signs Last 24 Hrs  T(C): 36.8 (19 Oct 2018 04:57), Max: 37.2 (18 Oct 2018 20:07)  T(F): 98.3 (19 Oct 2018 04:57), Max: 98.9 (18 Oct 2018 20:07)  HR: 77 (19 Oct 2018 04:57) (77 - 90)  BP: 124/73 (19 Oct 2018 04:57) (121/90 - 142/97)  BP(mean): --  RR: 18 (19 Oct 2018 04:57) (18 - 18)  SpO2: 94% (19 Oct 2018 04:57) (94% - 98%)    HEENT: Normal Oral mucosa, PERRL, EOMI  Lymphatic: No obvious lymphadenopathy, No edema  Cardiovascular: Normal S1S2, No JVD, 1/6 LOS, Peripheral pulses palpable 2+ B/L  Respiratory: Lungs clear to auscultation, normal effort  Gastrointestinal: Abdomen soft, ND, NT, +BS  Skin: Warm, dry, intact. No cyanosis, No rash.  Musculoskeletal: + pain/discomfort with movement of the left arm at the shoulder.   Psych: Appropriate Mood and Affect      DIAGNOSTIC DATA  TELEMETRY: Continues NSR 70's-80's. No arrhtyhmias    RADIOLOGY:   < from: Xray Chest 2 Views PA/Lat (10.18.18 @ 11:56) >  IMPRESSION:   Clear lungs.         ASSESSMENT AND PLAN:  58yMale with history of DM, Tobacco abuse, FHx of CAD admitted with chest pain and abnormal NST s/p cath found to have 3VD now pending CTS       -- Preop/ Pending CABG w/ Dr. Baldwin  -- Will continue to monitor patient from EP perspective  -- cw hep gtt for now  -- HD Stable no evidence CHF  -- I have discussed with patient that his heart at this moment takes precedence over his LEFT shoulder/arm pain  -- care per CTS      Octavia Baird PA-C

## 2018-10-19 NOTE — PROVIDER CONTACT NOTE (OTHER) - ACTION/TREATMENT ORDERED:
NP aware. Keep Heparin drip at 1100 units/hr, labs as per protocol.
Increase metoprolol to 25 mg BID, no additional orders at this time, will continue to monitor pt

## 2018-10-19 NOTE — PROGRESS NOTE ADULT - ASSESSMENT
58y Male WITH PMHx of current smoker, DM, Rt inguinal hernia repair and paraumbilical hernia repair who presented to [facility] ER with c/o chest pain x 8 hrs.  Pt notes after waking up 8 hrs, pt tried to get up out of bed and noticed left upper chest pain radiating to left upper back and shoulder described as moderate to severe aching pain. Patient was consulted by neurology for left arm and shoulder pain with associated numbness and tingling and weakness in his left arm. CT Head negative for any acute infarct.  Per neuro: DDX: brachial plexus stretch injury vs rotator cuff injury with nerve impingement. Would recommend PT and OT and complete cardiac workup and continue pain control as needed with NSAIDS and if no improvement in the next 2-3 weeks will perform outpatient EMG at [phone].     Patient was initially stayed in CDU for stress test which showed * Myocardial Perfusion SPECT results are abnormal at 90 % of MPHR. * There are medium sized, moderate defects in inferior and inferoseptal walls that are reversible, suggestive of ischemia. * Post-stress gated wall motion analysis was performed (LVEF = 52 %;LVEDV = 62 ml.), revealing mild hypokinesis of the inferior wall. RV function appeared normal.  RV function appeared normal. *** No previous Nuclear/Stress exam. Patient was admitted for abnormal stress test and underwent cardiac cath which showed: pLAD 70 with +IFR 0.89, OM1 90, m/dRCA 70; RFA access . Sheath removed by 21:30. RFA site appears stable. No signs of bleeding or hematoma. Good pedal pulse.  It was recommended that Heparin gtt to be started 6 hours s/p sheath removal and transferred to  [facility] for CABG eval with Dr. Baldwin.    10/18 Pt doing well no issues overnight.  No CP, SOB.  Has some R Arm and shoulder pain from prior nerve impingement.  Continue pre-op work-up with plan for CABG next week.  10/19 IS CP free, no issues overnight awating OR Monday.  Heparin gtt theraputic

## 2018-10-20 LAB
ANION GAP SERPL CALC-SCNC: 11 MMOL/L — SIGNIFICANT CHANGE UP (ref 5–17)
APTT BLD: 73.2 SEC — HIGH (ref 27.5–37.4)
BUN SERPL-MCNC: 14 MG/DL — SIGNIFICANT CHANGE UP (ref 7–23)
CALCIUM SERPL-MCNC: 9.6 MG/DL — SIGNIFICANT CHANGE UP (ref 8.4–10.5)
CHLORIDE SERPL-SCNC: 101 MMOL/L — SIGNIFICANT CHANGE UP (ref 96–108)
CO2 SERPL-SCNC: 23 MMOL/L — SIGNIFICANT CHANGE UP (ref 22–31)
CREAT SERPL-MCNC: 0.96 MG/DL — SIGNIFICANT CHANGE UP (ref 0.5–1.3)
GLUCOSE SERPL-MCNC: 166 MG/DL — HIGH (ref 70–99)
HCT VFR BLD CALC: 47.3 % — SIGNIFICANT CHANGE UP (ref 39–50)
HGB BLD-MCNC: 16.2 G/DL — SIGNIFICANT CHANGE UP (ref 13–17)
INR BLD: 1.05 RATIO — SIGNIFICANT CHANGE UP (ref 0.88–1.16)
MCHC RBC-ENTMCNC: 30.9 PG — SIGNIFICANT CHANGE UP (ref 27–34)
MCHC RBC-ENTMCNC: 34.3 GM/DL — SIGNIFICANT CHANGE UP (ref 32–36)
MCV RBC AUTO: 90 FL — SIGNIFICANT CHANGE UP (ref 80–100)
PLATELET # BLD AUTO: 296 K/UL — SIGNIFICANT CHANGE UP (ref 150–400)
POTASSIUM SERPL-MCNC: 4.1 MMOL/L — SIGNIFICANT CHANGE UP (ref 3.5–5.3)
POTASSIUM SERPL-SCNC: 4.1 MMOL/L — SIGNIFICANT CHANGE UP (ref 3.5–5.3)
PROTHROM AB SERPL-ACNC: 11.4 SEC — SIGNIFICANT CHANGE UP (ref 9.8–12.7)
RBC # BLD: 5.26 M/UL — SIGNIFICANT CHANGE UP (ref 4.2–5.8)
RBC # FLD: 12.9 % — SIGNIFICANT CHANGE UP (ref 10.3–14.5)
SODIUM SERPL-SCNC: 135 MMOL/L — SIGNIFICANT CHANGE UP (ref 135–145)
T4 FREE SERPL-MCNC: 1.4 NG/DL — SIGNIFICANT CHANGE UP (ref 0.9–1.8)
WBC # BLD: 8.4 K/UL — SIGNIFICANT CHANGE UP (ref 3.8–10.5)
WBC # FLD AUTO: 8.4 K/UL — SIGNIFICANT CHANGE UP (ref 3.8–10.5)

## 2018-10-20 RX ORDER — INSULIN GLARGINE 100 [IU]/ML
16 INJECTION, SOLUTION SUBCUTANEOUS AT BEDTIME
Qty: 0 | Refills: 0 | Status: DISCONTINUED | OUTPATIENT
Start: 2018-10-20 | End: 2018-10-22

## 2018-10-20 RX ADMIN — Medication 2: at 08:12

## 2018-10-20 RX ADMIN — Medication 6 UNIT(S): at 12:07

## 2018-10-20 RX ADMIN — Medication 81 MILLIGRAM(S): at 12:06

## 2018-10-20 RX ADMIN — OXYCODONE AND ACETAMINOPHEN 2 TABLET(S): 5; 325 TABLET ORAL at 12:07

## 2018-10-20 RX ADMIN — SODIUM CHLORIDE 3 MILLILITER(S): 9 INJECTION INTRAMUSCULAR; INTRAVENOUS; SUBCUTANEOUS at 21:16

## 2018-10-20 RX ADMIN — OXYCODONE AND ACETAMINOPHEN 2 TABLET(S): 5; 325 TABLET ORAL at 22:31

## 2018-10-20 RX ADMIN — Medication 25 MILLIGRAM(S): at 17:01

## 2018-10-20 RX ADMIN — INSULIN GLARGINE 16 UNIT(S): 100 INJECTION, SOLUTION SUBCUTANEOUS at 21:42

## 2018-10-20 RX ADMIN — SODIUM CHLORIDE 3 MILLILITER(S): 9 INJECTION INTRAMUSCULAR; INTRAVENOUS; SUBCUTANEOUS at 14:06

## 2018-10-20 RX ADMIN — OXYCODONE AND ACETAMINOPHEN 2 TABLET(S): 5; 325 TABLET ORAL at 12:37

## 2018-10-20 RX ADMIN — Medication 6 UNIT(S): at 17:01

## 2018-10-20 RX ADMIN — ATORVASTATIN CALCIUM 40 MILLIGRAM(S): 80 TABLET, FILM COATED ORAL at 21:15

## 2018-10-20 RX ADMIN — Medication 25 MILLIGRAM(S): at 05:29

## 2018-10-20 RX ADMIN — OXYCODONE AND ACETAMINOPHEN 2 TABLET(S): 5; 325 TABLET ORAL at 23:00

## 2018-10-20 RX ADMIN — Medication 6 UNIT(S): at 08:12

## 2018-10-20 RX ADMIN — HEPARIN SODIUM 11 UNIT(S)/HR: 5000 INJECTION INTRAVENOUS; SUBCUTANEOUS at 08:12

## 2018-10-20 RX ADMIN — SODIUM CHLORIDE 3 MILLILITER(S): 9 INJECTION INTRAMUSCULAR; INTRAVENOUS; SUBCUTANEOUS at 05:29

## 2018-10-20 RX ADMIN — Medication 2: at 21:42

## 2018-10-20 NOTE — PROGRESS NOTE ADULT - SUBJECTIVE AND OBJECTIVE BOX
Patient denies CP, SOB, still with left arm pain Review of systems otherwise (-)    acetaminophen   Tablet .. 650 milliGRAM(s) Oral every 6 hours PRN  aspirin enteric coated 81 milliGRAM(s) Oral daily  atorvastatin 40 milliGRAM(s) Oral at bedtime  cefuroxime  IVPB 1500 milliGRAM(s) IV Intermittent once  dextrose 40% Gel 15 Gram(s) Oral once PRN  dextrose 5%. 1000 milliLiter(s) IV Continuous <Continuous>  dextrose 50% Injectable 12.5 Gram(s) IV Push once  dextrose 50% Injectable 25 Gram(s) IV Push once  dextrose 50% Injectable 25 Gram(s) IV Push once  glucagon  Injectable 1 milliGRAM(s) IntraMuscular once PRN  heparin  Infusion 1000 Unit(s)/Hr IV Continuous <Continuous>  insulin glargine Injectable (LANTUS) 12 Unit(s) SubCutaneous at bedtime  insulin lispro (HumaLOG) corrective regimen sliding scale   SubCutaneous Before meals and at bedtime  insulin lispro Injectable (HumaLOG) 6 Unit(s) SubCutaneous three times a day before meals  metoprolol tartrate 25 milliGRAM(s) Oral two times a day  oxyCODONE    5 mG/acetaminophen 325 mG 2 Tablet(s) Oral every 6 hours PRN  oxyCODONE    5 mG/acetaminophen 325 mG 1 Tablet(s) Oral every 4 hours PRN  sodium chloride 0.9% lock flush 3 milliLiter(s) IV Push every 8 hours                            16.2   8.4   )-----------( 296      ( 20 Oct 2018 05:48 )             47.3       Hemoglobin: 16.2 g/dL (10-20 @ 05:48)  Hemoglobin: 15.5 g/dL (10-19 @ 06:14)  Hemoglobin: 15.8 g/dL (10-18 @ 16:03)  Hemoglobin: 14.8 g/dL (10-18 @ 02:30)  Hemoglobin: 14.5 g/dL (10-17 @ 16:30)      10-20    135  |  101  |  14  ----------------------------<  166<H>  4.1   |  23  |  0.96    Ca    9.6      20 Oct 2018 05:48      Creatinine Trend: 0.96<--, 0.91<--, 0.85<--, 1.06<--, 0.91<--    COAGS: PT/INR - ( 20 Oct 2018 05:48 )   PT: 11.4 sec;   INR: 1.05 ratio         PTT - ( 20 Oct 2018 05:48 )  PTT:73.2 sec    CARDIAC MARKERS ( 19 Oct 2018 14:32 )  x     / x     / 141 U/L / x     / 1.6 ng/mL        T(C): 37 (10-20-18 @ 05:00), Max: 37 (10-20-18 @ 05:00)  HR: 79 (10-20-18 @ 05:00) (79 - 83)  BP: 118/72 (10-20-18 @ 05:00) (118/72 - 132/94)  RR: 18 (10-20-18 @ 05:00) (18 - 18)  SpO2: 94% (10-20-18 @ 05:00) (94% - 98%)  Wt(kg): --    I&O's Summary    19 Oct 2018 07:01  -  20 Oct 2018 07:00  --------------------------------------------------------  IN: 724 mL / OUT: 400 mL / NET: 324 mL    20 Oct 2018 07:01  -  20 Oct 2018 10:34  --------------------------------------------------------  IN: 120 mL / OUT: 0 mL / NET: 120 mL      Appearance: Normal	  HEENT:   Normal oral mucosa, PERRL, EOMI	  Lymphatic: No lymphadenopathy , no edema  Cardiovascular: Normal S1 S2, No JVD, No murmurs , Peripheral pulses palpable 2+ bilaterally  Respiratory: Lungs clear to auscultation, normal effort 	  Gastrointestinal:  Soft, Non-tender, + BS	  Skin: No rashes, No ecchymoses, No cyanosis, warm to touch  Musculoskeletal: Normal range of motion, normal strength  Psychiatry:  Mood & affect appropriate    TELEMETRY: SR	      DIAGNOSTIC TESTING:  [ ] Echocardiogram: < from: Transthoracic Echocardiogram (10.18.18 @ 12:46) >  Conclusions:  1. Normal left ventricular internal dimensions and wall  thicknesses.  2. Mild global left ventricular systolic dysfunction with  mild segmental abnormalities.  Mild hypokinesis of the base  to mid anteroseptal, septal walls.  3. Normal diastolic function  4. Normal right ventricular size and function.  *** No previous Echo exam.    < end of copied text >    [ ]  Catheterization:  -Cath with severe stenosis in OM1, LAD (which was functionally significant by IFR of 0.89) and RCA    [ ] Stress Test:  < from: Nuclear Stress Test-Exercise (10.17.18 @ 09:43) >  IMPRESSIONS:Abnormal Study  * Myocardial Perfusion SPECT results are abnormal at 90 %  of MPHR.  * There are medium sized, moderate defects in inferior and  inferoseptal walls that are reversible, suggestive of  ischemia.  * Post-stress gated wall motion analysis was performed  (LVEF = 52 %;LVEDV = 62 ml.), revealing mild hypokinesis  of the inferior wall. RV function appeared normal.  RV  function appeared normal.  *** No previous Nuclear/Stress exam.    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     - awaiting CABG monday  - No clinical CHF  - No pertinent findings on tele  - cont Asa Statin    León Gibson MD, FACC Patient denies CP, SOB, still with left arm pain Review of systems otherwise (-)    acetaminophen   Tablet .. 650 milliGRAM(s) Oral every 6 hours PRN  aspirin enteric coated 81 milliGRAM(s) Oral daily  atorvastatin 40 milliGRAM(s) Oral at bedtime  cefuroxime  IVPB 1500 milliGRAM(s) IV Intermittent once  dextrose 40% Gel 15 Gram(s) Oral once PRN  dextrose 5%. 1000 milliLiter(s) IV Continuous <Continuous>  dextrose 50% Injectable 12.5 Gram(s) IV Push once  dextrose 50% Injectable 25 Gram(s) IV Push once  dextrose 50% Injectable 25 Gram(s) IV Push once  glucagon  Injectable 1 milliGRAM(s) IntraMuscular once PRN  heparin  Infusion 1000 Unit(s)/Hr IV Continuous <Continuous>  insulin glargine Injectable (LANTUS) 12 Unit(s) SubCutaneous at bedtime  insulin lispro (HumaLOG) corrective regimen sliding scale   SubCutaneous Before meals and at bedtime  insulin lispro Injectable (HumaLOG) 6 Unit(s) SubCutaneous three times a day before meals  metoprolol tartrate 25 milliGRAM(s) Oral two times a day  oxyCODONE    5 mG/acetaminophen 325 mG 2 Tablet(s) Oral every 6 hours PRN  oxyCODONE    5 mG/acetaminophen 325 mG 1 Tablet(s) Oral every 4 hours PRN  sodium chloride 0.9% lock flush 3 milliLiter(s) IV Push every 8 hours                            16.2   8.4   )-----------( 296      ( 20 Oct 2018 05:48 )             47.3       Hemoglobin: 16.2 g/dL (10-20 @ 05:48)  Hemoglobin: 15.5 g/dL (10-19 @ 06:14)  Hemoglobin: 15.8 g/dL (10-18 @ 16:03)  Hemoglobin: 14.8 g/dL (10-18 @ 02:30)  Hemoglobin: 14.5 g/dL (10-17 @ 16:30)      10-20    135  |  101  |  14  ----------------------------<  166<H>  4.1   |  23  |  0.96    Ca    9.6      20 Oct 2018 05:48      Creatinine Trend: 0.96<--, 0.91<--, 0.85<--, 1.06<--, 0.91<--    COAGS: PT/INR - ( 20 Oct 2018 05:48 )   PT: 11.4 sec;   INR: 1.05 ratio         PTT - ( 20 Oct 2018 05:48 )  PTT:73.2 sec    CARDIAC MARKERS ( 19 Oct 2018 14:32 )  x     / x     / 141 U/L / x     / 1.6 ng/mL        T(C): 37 (10-20-18 @ 05:00), Max: 37 (10-20-18 @ 05:00)  HR: 79 (10-20-18 @ 05:00) (79 - 83)  BP: 118/72 (10-20-18 @ 05:00) (118/72 - 132/94)  RR: 18 (10-20-18 @ 05:00) (18 - 18)  SpO2: 94% (10-20-18 @ 05:00) (94% - 98%)  Wt(kg): --    I&O's Summary    19 Oct 2018 07:01  -  20 Oct 2018 07:00  --------------------------------------------------------  IN: 724 mL / OUT: 400 mL / NET: 324 mL    20 Oct 2018 07:01  -  20 Oct 2018 10:34  --------------------------------------------------------  IN: 120 mL / OUT: 0 mL / NET: 120 mL      Appearance: Normal	  HEENT:   Normal oral mucosa, PERRL, EOMI	  Lymphatic: No lymphadenopathy , no edema  Cardiovascular: Normal S1 S2, No JVD, No murmurs , Peripheral pulses palpable 2+ bilaterally  Respiratory: Lungs clear to auscultation, normal effort 	  Gastrointestinal:  Soft, Non-tender, + BS	  Skin: No rashes, No ecchymoses, No cyanosis, warm to touch  Musculoskeletal: Normal range of motion, normal strength  Psychiatry:  Mood & affect appropriate    TELEMETRY: SR	      DIAGNOSTIC TESTING:  [ ] Echocardiogram: < from: Transthoracic Echocardiogram (10.18.18 @ 12:46) >  Conclusions:  1. Normal left ventricular internal dimensions and wall  thicknesses.  2. Mild global left ventricular systolic dysfunction with  mild segmental abnormalities.  Mild hypokinesis of the base  to mid anteroseptal, septal walls.  3. Normal diastolic function  4. Normal right ventricular size and function.  *** No previous Echo exam.    < end of copied text >    [ ]  Catheterization:  -Cath with severe stenosis in OM1, LAD (which was functionally significant by IFR of 0.89) and RCA    [ ] Stress Test:  < from: Nuclear Stress Test-Exercise (10.17.18 @ 09:43) >  IMPRESSIONS:Abnormal Study  * Myocardial Perfusion SPECT results are abnormal at 90 %  of MPHR.  * There are medium sized, moderate defects in inferior and  inferoseptal walls that are reversible, suggestive of  ischemia.  * Post-stress gated wall motion analysis was performed  (LVEF = 52 %;LVEDV = 62 ml.), revealing mild hypokinesis  of the inferior wall. RV function appeared normal.  RV  function appeared normal.  *** No previous Nuclear/Stress exam.    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     - awaiting CABG monday  - No clinical CHF  - No pertinent findings on tele  - cont Asa Statin BB    León Gibson MD, FACC

## 2018-10-20 NOTE — PROGRESS NOTE ADULT - SUBJECTIVE AND OBJECTIVE BOX
SUBJECTIVE: Patient with no anginal chest pain or shortness of breath. States he's ready and pending CABG Monday with .    ROS otherwise negative.        MEDICATIONS  (STANDING):  aspirin enteric coated 81 milliGRAM(s) Oral daily  atorvastatin 40 milliGRAM(s) Oral at bedtime  cefuroxime  IVPB 1500 milliGRAM(s) IV Intermittent once  dextrose 5%. 1000 milliLiter(s) (50 mL/Hr) IV Continuous <Continuous>  dextrose 50% Injectable 12.5 Gram(s) IV Push once  dextrose 50% Injectable 25 Gram(s) IV Push once  dextrose 50% Injectable 25 Gram(s) IV Push once  heparin  Infusion 1000 Unit(s)/Hr (11 mL/Hr) IV Continuous <Continuous>  insulin glargine Injectable (LANTUS) 12 Unit(s) SubCutaneous at bedtime  insulin lispro (HumaLOG) corrective regimen sliding scale   SubCutaneous Before meals and at bedtime  insulin lispro Injectable (HumaLOG) 6 Unit(s) SubCutaneous three times a day before meals  metoprolol tartrate 25 milliGRAM(s) Oral two times a day  sodium chloride 0.9% lock flush 3 milliLiter(s) IV Push every 8 hours    MEDICATIONS  (PRN):  acetaminophen   Tablet .. 650 milliGRAM(s) Oral every 6 hours PRN Mild Pain (1 - 3)  dextrose 40% Gel 15 Gram(s) Oral once PRN Blood Glucose LESS THAN 70 milliGRAM(s)/deciLiter  glucagon  Injectable 1 milliGRAM(s) IntraMuscular once PRN Glucose <70 milliGRAM(s)/deciLiter  oxyCODONE    5 mG/acetaminophen 325 mG 2 Tablet(s) Oral every 6 hours PRN Severe Pain (7 - 10)  oxyCODONE    5 mG/acetaminophen 325 mG 1 Tablet(s) Oral every 4 hours PRN Moderate Pain (4 - 6)      LABS:                        16.2   8.4   )-----------( 296      ( 20 Oct 2018 05:48 )             47.3     Hemoglobin: 16.2 g/dL (10-20 @ 05:48)  Hemoglobin: 15.5 g/dL (10-19 @ 06:14)  Hemoglobin: 15.8 g/dL (10-18 @ 16:03)  Hemoglobin: 14.8 g/dL (10-18 @ 02:30)  Hemoglobin: 14.5 g/dL (10-17 @ 16:30)    10-20    135  |  101  |  14  ----------------------------<  166<H>  4.1   |  23  |  0.96    Ca    9.6      20 Oct 2018 05:48      Creatinine Trend: 0.96<--, 0.91<--, 0.85<--, 1.06<--, 0.91<--   PT/INR - ( 20 Oct 2018 05:48 )   PT: 11.4 sec;   INR: 1.05 ratio         PTT - ( 20 Oct 2018 05:48 )  PTT:73.2 sec  CARDIAC MARKERS ( 19 Oct 2018 14:32 )  x     / x     / 141 U/L / x     / 1.6 ng/mL        PHYSICAL EXAM  Vital Signs Last 24 Hrs  T(C): 37 (20 Oct 2018 05:00), Max: 37 (20 Oct 2018 05:00)  T(F): 98.6 (20 Oct 2018 05:00), Max: 98.6 (20 Oct 2018 05:00)  HR: 79 (20 Oct 2018 05:00) (79 - 83)  BP: 118/72 (20 Oct 2018 05:00) (118/72 - 132/94)  BP(mean): --  RR: 18 (20 Oct 2018 05:00) (18 - 18)  SpO2: 94% (20 Oct 2018 05:00) (94% - 98%)    HEENT: Normal Oral mucosa, PERRL, EOMI  Lymphatic: No obvious lymphadenopathy, No edema  Cardiovascular: Normal S1S2, No JVD, 1/6 LOS, Peripheral pulses palpable 2+ B/L  Respiratory: Lungs clear to auscultation, normal effort  Gastrointestinal: Abdomen soft, ND, NT, +BS  Skin: Warm, dry, intact. No cyanosis, No rash.  Musculoskeletal: + pain/discomfort with movement of the left arm at the shoulder.   Psych: Appropriate Mood and Affect      DIAGNOSTIC DATA  TELEMETRY: Continues NSR 70's-80's. No arrhtyhmias    RADIOLOGY:   < from: Xray Chest 2 Views PA/Lat (10.18.18 @ 11:56) >  IMPRESSION:   Clear lungs.         ASSESSMENT AND PLAN:  58yMale with history of DM, Tobacco abuse, FHx of CAD admitted with chest pain and abnormal NST s/p cath found to have 3VD now pending CTS       -- Preop/ Pending CABG w/ Dr. Baldwin on Monday  -- Will continue to monitor patient from EP perspective  -- Continue with  hep gtt for now  -- HD Stable no evidence CHF  -- care per CTS      Octavia Baird PA-C

## 2018-10-20 NOTE — PROGRESS NOTE ADULT - ASSESSMENT
58 year old male current smoker with family history of CAD with DM-II who presented to [facility] ED 10/17 complaining of chest pain/pressure with associated left arm numbness/weakness.  Denies dizziness, syncope, edema, orthopnea and PND.  CT head was performed showing no CVA and neurology was consulted who thought his symptoms were secondary to neuropathy.  Underwent a Cardiac work up, R/O MI, underwent Nuclear stress test revealing EF 52%, medium sized, moderate defects in inferior and inferoseptal walls that are reversible, suggestive of ischemia.   In light of patients cardiac risk factors, symptoms and abnormal noninvasive test findings there is high suspicion for CAD. Still have left shoulder pain with numbness left hand thumb and index finger.        Problem/Recommendation - 1:  Problem: Chest pain with Multivessel CAD . Recommendation: On Aspirin, BB, Statin and Heparin . Awaiting CABG . CTS and Cardiology helping.      Problem/Recommendation - 2:  ·  Problem: left Shoulder and arm pain with Numbness and tingling in left hand.  Recommendation: CT Head noted . Neurology help appreciated and outpt follow up. X Ray noted and tendinosis.      Problem/Recommendation - 3:  ·  Problem: Diabetes mellitus type 2 in nonobese.  Recommendation: Holding PO meds . Lantus and SSI .     Problem/Recommendation - 4:  ·  Problem: HLD (hyperlipidemia).  Recommendation: Statin .

## 2018-10-20 NOTE — PROGRESS NOTE ADULT - PROBLEM SELECTOR PLAN 1
Will increase Lantus to  16 units , stay on Humalog 6-6-6 . Will continue monitoring FS, log, will Follow up.  Patient counseled for compliance with consistent low carb diet.

## 2018-10-20 NOTE — PROGRESS NOTE ADULT - SUBJECTIVE AND OBJECTIVE BOX
Ian Alford MD Tel: [phone]  COVERING ENDOCRINE ATTENDING FOR DR. MANN     Chief complaint  Patient is a 58y old  Male who presents with a chief complaint of Cardiac Surgery evaluation (20 Oct 2018 13:14)   Review of systems  Patient in bed, looks comfortable, no fever,  had no hypoglycemia.    Labs and Fingersticks    CAPILLARY BLOOD GLUCOSE    Anion Gap, Serum: 11 (10-20 @ 05:48)  Anion Gap, Serum: 14 (10-19 @ 06:14)      Calcium, Total Serum: 9.6 (10-20 @ 05:48)  Calcium, Total Serum: 9.1 (10-19 @ 06:14)          10-20    135  |  101  |  14  ----------------------------<  166<H>  4.1   |  23  |  0.96    Ca    9.6      20 Oct 2018 05:48                          16.2   8.4   )-----------( 296      ( 20 Oct 2018 05:48 )             47.3     Medications  MEDICATIONS  (STANDING):  aspirin enteric coated 81 milliGRAM(s) Oral daily  atorvastatin 40 milliGRAM(s) Oral at bedtime  cefuroxime  IVPB 1500 milliGRAM(s) IV Intermittent once  dextrose 5%. 1000 milliLiter(s) (50 mL/Hr) IV Continuous <Continuous>  dextrose 50% Injectable 12.5 Gram(s) IV Push once  dextrose 50% Injectable 25 Gram(s) IV Push once  dextrose 50% Injectable 25 Gram(s) IV Push once  heparin  Infusion 1000 Unit(s)/Hr (11 mL/Hr) IV Continuous <Continuous>  insulin glargine Injectable (LANTUS) 12 Unit(s) SubCutaneous at bedtime  insulin lispro (HumaLOG) corrective regimen sliding scale   SubCutaneous Before meals and at bedtime  insulin lispro Injectable (HumaLOG) 6 Unit(s) SubCutaneous three times a day before meals  metoprolol tartrate 25 milliGRAM(s) Oral two times a day  sodium chloride 0.9% lock flush 3 milliLiter(s) IV Push every 8 hours      Physical Exam  General: Patient comfortable in bed,  and grand kids by her bedside  Vital Signs Last 12 Hrs  T(F): 98.2 (10-20-18 @ 13:26), Max: 98.6 (10-20-18 @ 05:00)  HR: 94 (10-20-18 @ 13:26) (79 - 94)  BP: 106/72 (10-20-18 @ 13:26) (106/72 - 118/72)  BP(mean): --  RR: 18 (10-20-18 @ 13:26) (18 - 18)  SpO2: 94% (10-20-18 @ 13:26) (94% - 94%)  Neck: No palpable thyroid nodules.  CVS: S1S2, No murmurs  Respiratory: No wheezing, no crepitations  GI: Abdomen soft, bowel sounds positive  Musculoskeletal: Positive edema lower extremities.   Skin: No skin rashes, no ecchymosis    Diagnostics    Free Thyroxine, Serum: AM Sched. Collection: 20-Oct-2018 06:00 (10-19 @ 06:47)

## 2018-10-20 NOTE — PROGRESS NOTE ADULT - ASSESSMENT
58y Male WITH PMHx of current smoker, DM, Rt inguinal hernia repair and paraumbilical hernia repair who presented to [facility] ER with c/o chest pain x 8 hrs.  Pt notes after waking up 8 hrs, pt tried to get up out of bed and noticed left upper chest pain radiating to left upper back and shoulder described as moderate to severe aching pain. Patient was consulted by neurology for left arm and shoulder pain with associated numbness and tingling and weakness in his left arm. CT Head negative for any acute infarct.  Per neuro: DDX: brachial plexus stretch injury vs rotator cuff injury with nerve impingement. Would recommend PT and OT and complete cardiac workup and continue pain control as needed with NSAIDS and if no improvement in the next 2-3 weeks will perform outpatient EMG at [phone].     Patient was initially stayed in CDU for stress test which showed * Myocardial Perfusion SPECT results are abnormal at 90 % of MPHR. * There are medium sized, moderate defects in inferior and inferoseptal walls that are reversible, suggestive of ischemia. * Post-stress gated wall motion analysis was performed (LVEF = 52 %;LVEDV = 62 ml.), revealing mild hypokinesis of the inferior wall. RV function appeared normal.  RV function appeared normal. *** No previous Nuclear/Stress exam. Patient was admitted for abnormal stress test and underwent cardiac cath which showed: pLAD 70 with +IFR 0.89, OM1 90, m/dRCA 70; RFA access . Sheath removed by 21:30. RFA site appears stable. No signs of bleeding or hematoma. Good pedal pulse.  It was recommended that Heparin gtt to be started 6 hours s/p sheath removal and transferred to  [facility] for CABG eval with Dr. Baldwin.    10/18 Pt doing well no issues overnight.  No CP, SOB.  Has some R Arm and shoulder pain from prior nerve impingement.  Continue pre-op work-up with plan for CABG next week.  10/19 IS CP free, no issues overnight awating OR Monday.  Heparin gtt theraputic

## 2018-10-20 NOTE — PROGRESS NOTE ADULT - SUBJECTIVE AND OBJECTIVE BOX
pt seen and examined, no complaints, ROS - .     acetaminophen   Tablet .. 650 milliGRAM(s) Oral every 6 hours PRN  aspirin enteric coated 81 milliGRAM(s) Oral daily  atorvastatin 40 milliGRAM(s) Oral at bedtime  cefuroxime  IVPB 1500 milliGRAM(s) IV Intermittent once  dextrose 40% Gel 15 Gram(s) Oral once PRN  dextrose 5%. 1000 milliLiter(s) IV Continuous <Continuous>  dextrose 50% Injectable 12.5 Gram(s) IV Push once  dextrose 50% Injectable 25 Gram(s) IV Push once  dextrose 50% Injectable 25 Gram(s) IV Push once  glucagon  Injectable 1 milliGRAM(s) IntraMuscular once PRN  heparin  Infusion 1000 Unit(s)/Hr IV Continuous <Continuous>  insulin glargine Injectable (LANTUS) 12 Unit(s) SubCutaneous at bedtime  insulin lispro (HumaLOG) corrective regimen sliding scale   SubCutaneous Before meals and at bedtime  insulin lispro Injectable (HumaLOG) 6 Unit(s) SubCutaneous three times a day before meals  metoprolol tartrate 25 milliGRAM(s) Oral two times a day  oxyCODONE    5 mG/acetaminophen 325 mG 2 Tablet(s) Oral every 6 hours PRN  oxyCODONE    5 mG/acetaminophen 325 mG 1 Tablet(s) Oral every 4 hours PRN  sodium chloride 0.9% lock flush 3 milliLiter(s) IV Push every 8 hours                            15.5   7.0   )-----------( 280      ( 19 Oct 2018 06:14 )             46.0       Hemoglobin: 15.5 g/dL (10-19 @ 06:14)  Hemoglobin: 15.8 g/dL (10-18 @ 16:03)  Hemoglobin: 14.8 g/dL (10-18 @ 02:30)  Hemoglobin: 14.5 g/dL (10-17 @ 16:30)  Hemoglobin: 14.4 g/dL (10-16 @ 07:30)      10-19    137  |  102  |  12  ----------------------------<  187<H>  4.0   |  21<L>  |  0.91    Ca    9.1      19 Oct 2018 06:14      Creatinine Trend: 0.91<--, 0.85<--, 1.06<--, 0.91<--    COAGS:     CARDIAC MARKERS ( 19 Oct 2018 14:32 )  x     / x     / 141 U/L / x     / 1.6 ng/mL        T(C): 36.7 (10-19-18 @ 19:18), Max: 36.7 (10-19-18 @ 19:18)  HR: 80 (10-19-18 @ 19:18) (80 - 83)  BP: 120/75 (10-19-18 @ 19:18) (120/75 - 132/94)  RR: 18 (10-19-18 @ 19:18) (18 - 18)  SpO2: 95% (10-19-18 @ 19:18) (95% - 98%)  Wt(kg): --    I&O's Summary    18 Oct 2018 07:01  -  19 Oct 2018 07:00  --------------------------------------------------------  IN: 700 mL / OUT: 750 mL / NET: -50 mL    19 Oct 2018 07:01  -  20 Oct 2018 04:59  --------------------------------------------------------  IN: 724 mL / OUT: 0 mL / NET: 724 mL        Appearance: Normal	  HEENT:   Normal oral mucosa, PERRL, EOMI	  Lymphatic: No lymphadenopathy , no edema  Cardiovascular: Normal S1 S2, No JVD, No murmurs , Peripheral pulses palpable 2+ bilaterally  Respiratory: Lungs clear to auscultation, normal effort 	  Gastrointestinal:  Soft, Non-tender, + BS	  Skin: No rashes, No ecchymoses, No cyanosis, warm to touch  Musculoskeletal: Normal range of motion, normal strength  Psychiatry:  Mood & affect appropriate    TELEMETRY: SR	      DIAGNOSTIC TESTING:  [ ] Echocardiogram: < from: Transthoracic Echocardiogram (10.18.18 @ 12:46) >  Conclusions:  1. Normal left ventricular internal dimensions and wall  thicknesses.  2. Mild global left ventricular systolic dysfunction with  mild segmental abnormalities.  Mild hypokinesis of the base  to mid anteroseptal, septal walls.  3. Normal diastolic function  4. Normal right ventricular size and function.  *** No previous Echo exam.    < end of copied text >    [ ]  Catheterization:  -Cath with severe stenosis in OM1, LAD (which was functionally significant by IFR of 0.89) and RCA    [ ] Stress Test:  < from: Nuclear Stress Test-Exercise (10.17.18 @ 09:43) >  IMPRESSIONS:Abnormal Study  * Myocardial Perfusion SPECT results are abnormal at 90 %  of MPHR.  * There are medium sized, moderate defects in inferior and  inferoseptal walls that are reversible, suggestive of  ischemia.  * Post-stress gated wall motion analysis was performed  (LVEF = 52 %;LVEDV = 62 ml.), revealing mild hypokinesis  of the inferior wall. RV function appeared normal.  RV  function appeared normal.  *** No previous Nuclear/Stress exam.    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     - tele stable   - ASA, statin    -hep gtt for CAD   - cotn BB tele stable.   -cabg workup in progress  -cabg tentatively for Monday per cts  -  GI / DVT prophylaxis,  keep K>4, mag >2.0   D/W Dr Hoffman

## 2018-10-20 NOTE — PROGRESS NOTE ADULT - SUBJECTIVE AND OBJECTIVE BOX
INTERVAL HPI/OVERNIGHT EVENTS: I feel fine.   Vital Signs Last 24 Hrs  T(C): 37 (20 Oct 2018 05:00), Max: 37 (20 Oct 2018 05:00)  T(F): 98.6 (20 Oct 2018 05:00), Max: 98.6 (20 Oct 2018 05:00)  HR: 79 (20 Oct 2018 05:00) (79 - 83)  BP: 118/72 (20 Oct 2018 05:00) (118/72 - 132/94)  BP(mean): --  RR: 18 (20 Oct 2018 05:00) (18 - 18)  SpO2: 94% (20 Oct 2018 05:00) (94% - 98%)  I&O's Summary    19 Oct 2018 07:01  -  20 Oct 2018 07:00  --------------------------------------------------------  IN: 724 mL / OUT: 400 mL / NET: 324 mL    20 Oct 2018 07:01  -  20 Oct 2018 11:51  --------------------------------------------------------  IN: 120 mL / OUT: 0 mL / NET: 120 mL      MEDICATIONS  (STANDING):  aspirin enteric coated 81 milliGRAM(s) Oral daily  atorvastatin 40 milliGRAM(s) Oral at bedtime  cefuroxime  IVPB 1500 milliGRAM(s) IV Intermittent once  dextrose 5%. 1000 milliLiter(s) (50 mL/Hr) IV Continuous <Continuous>  dextrose 50% Injectable 12.5 Gram(s) IV Push once  dextrose 50% Injectable 25 Gram(s) IV Push once  dextrose 50% Injectable 25 Gram(s) IV Push once  heparin  Infusion 1000 Unit(s)/Hr (11 mL/Hr) IV Continuous <Continuous>  insulin glargine Injectable (LANTUS) 12 Unit(s) SubCutaneous at bedtime  insulin lispro (HumaLOG) corrective regimen sliding scale   SubCutaneous Before meals and at bedtime  insulin lispro Injectable (HumaLOG) 6 Unit(s) SubCutaneous three times a day before meals  metoprolol tartrate 25 milliGRAM(s) Oral two times a day  sodium chloride 0.9% lock flush 3 milliLiter(s) IV Push every 8 hours    MEDICATIONS  (PRN):  acetaminophen   Tablet .. 650 milliGRAM(s) Oral every 6 hours PRN Mild Pain (1 - 3)  dextrose 40% Gel 15 Gram(s) Oral once PRN Blood Glucose LESS THAN 70 milliGRAM(s)/deciLiter  glucagon  Injectable 1 milliGRAM(s) IntraMuscular once PRN Glucose <70 milliGRAM(s)/deciLiter  oxyCODONE    5 mG/acetaminophen 325 mG 2 Tablet(s) Oral every 6 hours PRN Severe Pain (7 - 10)  oxyCODONE    5 mG/acetaminophen 325 mG 1 Tablet(s) Oral every 4 hours PRN Moderate Pain (4 - 6)    LABS:                        16.2   8.4   )-----------( 296      ( 20 Oct 2018 05:48 )             47.3     10-20    135  |  101  |  14  ----------------------------<  166<H>  4.1   |  23  |  0.96    Ca    9.6      20 Oct 2018 05:48      PT/INR - ( 20 Oct 2018 05:48 )   PT: 11.4 sec;   INR: 1.05 ratio         PTT - ( 20 Oct 2018 05:48 )  PTT:73.2 sec    CAPILLARY BLOOD GLUCOSE      POCT Blood Glucose.: 119 mg/dL (20 Oct 2018 11:48)  POCT Blood Glucose.: 183 mg/dL (20 Oct 2018 07:54)  POCT Blood Glucose.: 125 mg/dL (19 Oct 2018 21:28)  POCT Blood Glucose.: 163 mg/dL (19 Oct 2018 16:46)          REVIEW OF SYSTEMS:  CONSTITUTIONAL: No fever, weight loss, or fatigue  EYES: No eye pain, visual disturbances, or discharge  ENMT:  No difficulty hearing, tinnitus, vertigo; No sinus or throat pain  NECK: No pain or stiffness  RESPIRATORY: No cough, wheezing, chills or hemoptysis; No shortness of breath  CARDIOVASCULAR: No chest pain, palpitations, dizziness, or leg swelling  GASTROINTESTINAL: No abdominal or epigastric pain. No nausea, vomiting, or hematemesis; No diarrhea or constipation. No melena or hematochezia.  GENITOURINARY: No dysuria, frequency, hematuria, or incontinence  NEUROLOGICAL: No headaches, memory loss, loss of strength, numbness, or tremors      Consultant(s) Notes Reviewed:  [x ] YES  [ ] NO    PHYSICAL EXAM:  GENERAL: NAD, well-groomed, well-developed,not in any distress ,  HEAD:  Atraumatic, Normocephalic  EYES: EOMI, PERRLA, conjunctiva and sclera clear  ENMT: No tonsillar erythema, exudates, or enlargement; Moist mucous membranes, Good dentition, No lesions  NECK: Supple, No JVD, Normal thyroid  NERVOUS SYSTEM:  Alert & Oriented X3, No focal deficit   CHEST/LUNG: Good air entry bilateral with no  rales, rhonchi, wheezing, or rubs  HEART: Regular rate and rhythm; No murmurs, rubs, or gallops  ABDOMEN: Soft, Nontender, Nondistended; Bowel sounds present  EXTREMITIES:  2+ Peripheral Pulses, No clubbing, cyanosis, or edema    Care Discussed with Consultants/Other Providers [ x] YES  [ ] NO

## 2018-10-20 NOTE — PROGRESS NOTE ADULT - ASSESSMENT
Assessment  DMT2: 58y Male with DM T2 with hyperglycemia on insulin, dose adjusted, blood sugars improving, no hypoglycemic episode,  eating meals,  compliant with low carb diet.  CAD: Planing surgery, on medications, stable, monitored.  HTN: Controlled, On med.  HLD: On statin      Case discussed with the NP  Dr Alford [phone]

## 2018-10-21 LAB
ANION GAP SERPL CALC-SCNC: 12 MMOL/L — SIGNIFICANT CHANGE UP (ref 5–17)
APTT BLD: 73.2 SEC — HIGH (ref 27.5–37.4)
BLD GP AB SCN SERPL QL: NEGATIVE — SIGNIFICANT CHANGE UP
BUN SERPL-MCNC: 13 MG/DL — SIGNIFICANT CHANGE UP (ref 7–23)
CALCIUM SERPL-MCNC: 9.4 MG/DL — SIGNIFICANT CHANGE UP (ref 8.4–10.5)
CHLORIDE SERPL-SCNC: 102 MMOL/L — SIGNIFICANT CHANGE UP (ref 96–108)
CO2 SERPL-SCNC: 21 MMOL/L — LOW (ref 22–31)
CREAT SERPL-MCNC: 0.91 MG/DL — SIGNIFICANT CHANGE UP (ref 0.5–1.3)
GLUCOSE SERPL-MCNC: 145 MG/DL — HIGH (ref 70–99)
HCT VFR BLD CALC: 43.2 % — SIGNIFICANT CHANGE UP (ref 39–50)
HGB BLD-MCNC: 14.8 G/DL — SIGNIFICANT CHANGE UP (ref 13–17)
MCHC RBC-ENTMCNC: 30.8 PG — SIGNIFICANT CHANGE UP (ref 27–34)
MCHC RBC-ENTMCNC: 34.2 GM/DL — SIGNIFICANT CHANGE UP (ref 32–36)
MCV RBC AUTO: 89.9 FL — SIGNIFICANT CHANGE UP (ref 80–100)
PLATELET # BLD AUTO: 285 K/UL — SIGNIFICANT CHANGE UP (ref 150–400)
POTASSIUM SERPL-MCNC: 4 MMOL/L — SIGNIFICANT CHANGE UP (ref 3.5–5.3)
POTASSIUM SERPL-SCNC: 4 MMOL/L — SIGNIFICANT CHANGE UP (ref 3.5–5.3)
RBC # BLD: 4.81 M/UL — SIGNIFICANT CHANGE UP (ref 4.2–5.8)
RBC # FLD: 12.7 % — SIGNIFICANT CHANGE UP (ref 10.3–14.5)
RH IG SCN BLD-IMP: POSITIVE — SIGNIFICANT CHANGE UP
SODIUM SERPL-SCNC: 135 MMOL/L — SIGNIFICANT CHANGE UP (ref 135–145)
WBC # BLD: 6.6 K/UL — SIGNIFICANT CHANGE UP (ref 3.8–10.5)
WBC # FLD AUTO: 6.6 K/UL — SIGNIFICANT CHANGE UP (ref 3.8–10.5)

## 2018-10-21 RX ORDER — INSULIN LISPRO 100/ML
8 VIAL (ML) SUBCUTANEOUS
Qty: 0 | Refills: 0 | Status: DISCONTINUED | OUTPATIENT
Start: 2018-10-21 | End: 2018-10-22

## 2018-10-21 RX ORDER — CEFUROXIME AXETIL 250 MG
1500 TABLET ORAL ONCE
Qty: 0 | Refills: 0 | Status: DISCONTINUED | OUTPATIENT
Start: 2018-10-22 | End: 2018-10-22

## 2018-10-21 RX ORDER — CHLORHEXIDINE GLUCONATE 213 G/1000ML
1 SOLUTION TOPICAL ONCE
Qty: 0 | Refills: 0 | Status: COMPLETED | OUTPATIENT
Start: 2018-10-21 | End: 2018-10-21

## 2018-10-21 RX ORDER — CHLORHEXIDINE GLUCONATE 213 G/1000ML
5 SOLUTION TOPICAL ONCE
Qty: 0 | Refills: 0 | Status: DISCONTINUED | OUTPATIENT
Start: 2018-10-21 | End: 2018-10-22

## 2018-10-21 RX ADMIN — INSULIN GLARGINE 16 UNIT(S): 100 INJECTION, SOLUTION SUBCUTANEOUS at 21:39

## 2018-10-21 RX ADMIN — SODIUM CHLORIDE 3 MILLILITER(S): 9 INJECTION INTRAMUSCULAR; INTRAVENOUS; SUBCUTANEOUS at 05:27

## 2018-10-21 RX ADMIN — Medication 25 MILLIGRAM(S): at 18:11

## 2018-10-21 RX ADMIN — OXYCODONE AND ACETAMINOPHEN 2 TABLET(S): 5; 325 TABLET ORAL at 20:03

## 2018-10-21 RX ADMIN — CHLORHEXIDINE GLUCONATE 1 APPLICATION(S): 213 SOLUTION TOPICAL at 23:15

## 2018-10-21 RX ADMIN — Medication 25 MILLIGRAM(S): at 05:25

## 2018-10-21 RX ADMIN — OXYCODONE AND ACETAMINOPHEN 2 TABLET(S): 5; 325 TABLET ORAL at 20:33

## 2018-10-21 RX ADMIN — Medication 2: at 07:47

## 2018-10-21 RX ADMIN — ATORVASTATIN CALCIUM 40 MILLIGRAM(S): 80 TABLET, FILM COATED ORAL at 21:39

## 2018-10-21 RX ADMIN — Medication 81 MILLIGRAM(S): at 12:15

## 2018-10-21 RX ADMIN — SODIUM CHLORIDE 3 MILLILITER(S): 9 INJECTION INTRAMUSCULAR; INTRAVENOUS; SUBCUTANEOUS at 13:16

## 2018-10-21 RX ADMIN — Medication 6 UNIT(S): at 07:47

## 2018-10-21 RX ADMIN — SODIUM CHLORIDE 3 MILLILITER(S): 9 INJECTION INTRAMUSCULAR; INTRAVENOUS; SUBCUTANEOUS at 23:15

## 2018-10-21 RX ADMIN — Medication 8 UNIT(S): at 16:57

## 2018-10-21 RX ADMIN — Medication 2: at 12:15

## 2018-10-21 RX ADMIN — Medication 8 UNIT(S): at 12:15

## 2018-10-21 RX ADMIN — SODIUM CHLORIDE 3 MILLILITER(S): 9 INJECTION INTRAMUSCULAR; INTRAVENOUS; SUBCUTANEOUS at 20:18

## 2018-10-21 NOTE — PROGRESS NOTE ADULT - ASSESSMENT
58 year old male current smoker with family history of CAD with DM-II who presented to [facility] ED 10/17 complaining of chest pain/pressure with associated left arm numbness/weakness.  Denies dizziness, syncope, edema, orthopnea and PND.  CT head was performed showing no CVA and neurology was consulted who thought his symptoms were secondary to neuropathy.  Underwent a Cardiac work up, R/O MI, underwent Nuclear stress test revealing EF 52%, medium sized, moderate defects in inferior and inferoseptal walls that are reversible, suggestive of ischemia.   In light of patients cardiac risk factors, symptoms and abnormal noninvasive test findings there is high suspicion for CAD. Still have left shoulder pain with numbness left hand thumb and index finger.        Problem/Recommendation - 1:  Problem: Chest pain with Multivessel CAD . Recommendation: On Aspirin, BB, Statin and Heparin .Planned for  CABG tomorrow.  CTS and Cardiology helping.      Problem/Recommendation - 2:  ·  Problem: left Shoulder and arm pain with Numbness and tingling in left hand.  Recommendation: CT Head noted . Neurology help appreciated and outpt follow up. X Ray noted and tendinosis. PRN Pain meds.     Problem/Recommendation - 3:  ·  Problem: Diabetes mellitus type 2 in nonobese.  Recommendation: Sugars in good range . Holding PO meds . Lantus and SSI .     Problem/Recommendation - 4:  ·  Problem: HLD (hyperlipidemia).  Recommendation: Statin .

## 2018-10-21 NOTE — PROGRESS NOTE ADULT - SUBJECTIVE AND OBJECTIVE BOX
Ian Alford MD Tel: [phone]  COVERING ENDOCRINE ATTENDING FOR DR. MANN     Chief complaint  Patient is a 58y old  Male who presents with a chief complaint of Cardiac Surgery  preop (21 Oct 2018 06:28)   Review of systems  Patient in bed, looks comfortable, no fever,  had no hypoglycemia.    Labs and Fingersticks    CAPILLARY BLOOD GLUCOSE    Anion Gap, Serum: 12 (10-21 @ 06:51)  Anion Gap, Serum: 11 (10-20 @ 05:48)      Calcium, Total Serum: 9.4 (10-21 @ 06:51)  Calcium, Total Serum: 9.6 (10-20 @ 05:48)          10-21    135  |  102  |  13  ----------------------------<  145<H>  4.0   |  21<L>  |  0.91    Ca    9.4      21 Oct 2018 06:51                          14.8   6.6   )-----------( 285      ( 21 Oct 2018 06:56 )             43.2     Medications  MEDICATIONS  (STANDING):  aspirin enteric coated 81 milliGRAM(s) Oral daily  atorvastatin 40 milliGRAM(s) Oral at bedtime  cefuroxime  IVPB 1500 milliGRAM(s) IV Intermittent once  chlorhexidine 0.12% Liquid 5 milliLiter(s) Swish and Spit once  chlorhexidine 4% Liquid 1 Application(s) Topical once  dextrose 5%. 1000 milliLiter(s) (50 mL/Hr) IV Continuous <Continuous>  dextrose 50% Injectable 12.5 Gram(s) IV Push once  dextrose 50% Injectable 25 Gram(s) IV Push once  dextrose 50% Injectable 25 Gram(s) IV Push once  heparin  Infusion 1000 Unit(s)/Hr (11 mL/Hr) IV Continuous <Continuous>  insulin glargine Injectable (LANTUS) 16 Unit(s) SubCutaneous at bedtime  insulin lispro (HumaLOG) corrective regimen sliding scale   SubCutaneous Before meals and at bedtime  insulin lispro Injectable (HumaLOG) 8 Unit(s) SubCutaneous three times a day before meals  metoprolol tartrate 25 milliGRAM(s) Oral two times a day  sodium chloride 0.9% lock flush 3 milliLiter(s) IV Push every 8 hours      Physical Exam  General: Patient comfortable in bed  Vital Signs Last 12 Hrs  T(F): 98.2 (10-21-18 @ 05:27), Max: 98.2 (10-21-18 @ 05:27)  HR: 70 (10-21-18 @ 05:27) (70 - 70)  BP: 115/78 (10-21-18 @ 05:27) (115/78 - 115/78)  BP(mean): --  RR: 18 (10-21-18 @ 05:27) (18 - 18)  SpO2: 96% (10-21-18 @ 05:27) (96% - 96%)  Neck: No palpable thyroid nodules.  CVS: S1S2, No murmurs  Respiratory: No wheezing, no crepitations  GI: Abdomen soft, bowel sounds positive  Musculoskeletal: Positive edema lower extremities.   Skin: No skin rashes, no ecchymosis    Diagnostics    Free Thyroxine, Serum: AM Sched. Collection: 20-Oct-2018 06:00 (10-19 @ 06:47)

## 2018-10-21 NOTE — PROGRESS NOTE ADULT - SUBJECTIVE AND OBJECTIVE BOX
Cardiac Surgery Pre-op Note:    CC: Patient is a 58y old  Male who presents with a chief complaint of Cardiac Surgery evaluation (21 Oct 2018 05:23)      Referring Physician:                                                                                                           Surgeon:    Procedure: (Date) (Procedure)    Allergies    No Known Allergies    Intolerances        HPI:  History of Present Illness:    58y Male WITH PMHx of current smoker, DM, Rt inguinal hernia repair and paraumbilical hernia repair who presented to [facility] ER with c/o chest pain x 8 hrs.  Pt notes after waking up 8 hrs, pt tried to get up out of bed and noticed left upper chest pain radiating to left upper back and shoulder described as moderate to severe aching pain. Patient was consulted by neurology for left arm and shoulder pain with associated numbness and tingling and weakness in his left arm. CT Head negative for any acute infarct.  Per neuro: DDX: brachial plexus stretch injury vs rotator cuff injury with nerve impingement. Would recommend PT and OT and complete cardiac workup and continue pain control as needed with NSAIDS and if no improvement in the next 2-3 weeks will perform outpatient EMG at [phone].     Patient was initially stayed in CDU for stress test which showed * Myocardial Perfusion SPECT results are abnormal at 90 % of MPHR. * There are medium sized, moderate defects in inferior and inferoseptal walls that are reversible, suggestive of ischemia. * Post-stress gated wall motion analysis was performed (LVEF = 52 %;LVEDV = 62 ml.), revealing mild hypokinesis of the inferior wall. RV function appeared normal.  RV function appeared normal. *** No previous Nuclear/Stress exam. Patient was admitted for abnormal stress test and underwent cardiac cath which showed: pLAD 70 with +IFR 0.89, OM1 90, m/dRCA 70; RFA access . Sheath removed by 21:30. RFA site appears stable. No signs of bleeding or hematoma. Good pedal pulse.  It was recommended that Heparin gtt to be started 6 hours s/p sheath removal and transferred to  [facility] for CABG eval with Dr. Baldwin. (18 Oct 2018 00:43)      PAST MEDICAL & SURGICAL HISTORY:  CAD (coronary artery disease)  Umbilical hernia  Diabetes mellitus type 2 in nonobese  Paraumbilical hernia      MEDICATIONS  (STANDING):  aspirin enteric coated 81 milliGRAM(s) Oral daily  atorvastatin 40 milliGRAM(s) Oral at bedtime  cefuroxime  IVPB 1500 milliGRAM(s) IV Intermittent once  dextrose 5%. 1000 milliLiter(s) (50 mL/Hr) IV Continuous <Continuous>  dextrose 50% Injectable 12.5 Gram(s) IV Push once  dextrose 50% Injectable 25 Gram(s) IV Push once  dextrose 50% Injectable 25 Gram(s) IV Push once  heparin  Infusion 1000 Unit(s)/Hr (11 mL/Hr) IV Continuous <Continuous>  insulin glargine Injectable (LANTUS) 16 Unit(s) SubCutaneous at bedtime  insulin lispro (HumaLOG) corrective regimen sliding scale   SubCutaneous Before meals and at bedtime  insulin lispro Injectable (HumaLOG) 6 Unit(s) SubCutaneous three times a day before meals  metoprolol tartrate 25 milliGRAM(s) Oral two times a day  sodium chloride 0.9% lock flush 3 milliLiter(s) IV Push every 8 hours    MEDICATIONS  (PRN):  acetaminophen   Tablet .. 650 milliGRAM(s) Oral every 6 hours PRN Mild Pain (1 - 3)  dextrose 40% Gel 15 Gram(s) Oral once PRN Blood Glucose LESS THAN 70 milliGRAM(s)/deciLiter  glucagon  Injectable 1 milliGRAM(s) IntraMuscular once PRN Glucose <70 milliGRAM(s)/deciLiter  oxyCODONE    5 mG/acetaminophen 325 mG 2 Tablet(s) Oral every 6 hours PRN Severe Pain (7 - 10)  oxyCODONE    5 mG/acetaminophen 325 mG 1 Tablet(s) Oral every 4 hours PRN Moderate Pain (4 - 6)        Labs:                        16.2   8.4   )-----------( 296      ( 20 Oct 2018 05:48 )             47.3     10-20    135  |  101  |  14  ----------------------------<  166<H>  4.1   |  23  |  0.96    Ca    9.6      20 Oct 2018 05:48      PT/INR - ( 20 Oct 2018 05:48 )   PT: 11.4 sec;   INR: 1.05 ratio         PTT - ( 20 Oct 2018 05:48 )  PTT:73.2 sec    Blood Type: ABO Interpretation: B (10-19 @ 14:30)    HGB A1C: Hemoglobin A1C, Whole Blood: 9.0 % (10-16 @ 07:38)    Prealbumin:   Pro-BNP: Serum Pro-Brain Natriuretic Peptide: 63 pg/mL (10-18 @ 02:30)    Thyroid Panel: 10-20 @ 07:08/--  1.4/--/--  10-18 @ 05:38/4.71  --/7.3/125    MRSA: MRSA PCR Result.: Sole (10-18 @ 05:38)   / MSSA:       CXR:     EKG:    Carotid Duplex:      PFT's:    Echocardiogram:    Cardiac catheterization:    Vein Mapping:    Gen: WN/WD NAD  Neuro: AAOx3, nonfocal  Pulm: CTA B/L  CV: RRR, S1S2  Abd: Soft, NT, ND +BS  Ext: No edema, + peripheral pulses      Pt has AICD/PPM [ ] Yes  [ ] No             Brand Name:  Pre-op Beta Blocker ordered within 24 hrs of surgery (CABG ONLY)?  [ ] Yes  [ ] No  If not, Why?  Type & Cross  [ ] Yes  [ ] No  NPO after Midnight [ ] Yes  [ ] No  Pre-op ABX ordered, to be taped on chart:  [ ] Yes  [ ] No     Hibiclens/Peridex ordered [ ] Yes  [ ] No  Intraop on Hold: PRBCs, CXR, APRYL [ ]   Consent obtained  [ ] Yes  [ ] No Cardiac Surgery Pre-op Note:    CAD                                                                                                           Surgeon:  Denny    Procedure: cabg   10/22/18    Allergies    No Known Allergies    Intolerances  History of Present Illness:    58y Male WITH PMHx of current smoker, DM, Rt inguinal hernia repair and paraumbilical hernia repair who presented to [facility] ER with c/o chest pain x 8 hrs.  Pt notes after waking up 8 hrs, pt tried to get up out of bed and noticed left upper chest pain radiating to left upper back and shoulder described as moderate to severe aching pain. Patient was consulted by neurology for left arm and shoulder pain with associated numbness and tingling and weakness in his left arm. CT Head negative for any acute infarct.  Per neuro: DDX: brachial plexus stretch injury vs rotator cuff injury with nerve impingement. Would recommend PT and OT and complete cardiac workup and continue pain control as needed with NSAIDS and if no improvement in the next 2-3 weeks will perform outpatient EMG at [phone].     Patient was initially stayed in CDU for stress test which showed * Myocardial Perfusion SPECT results are abnormal at 90 % of MPHR. * There are medium sized, moderate defects in inferior and inferoseptal walls that are reversible, suggestive of ischemia. * Post-stress gated wall motion analysis was performed (LVEF = 52 %;LVEDV = 62 ml.), revealing mild hypokinesis of the inferior wall. RV function appeared normal.  RV function appeared normal. *** No previous Nuclear/Stress exam. Patient was admitted for abnormal stress test and underwent cardiac cath which showed: pLAD 70 with +IFR 0.89, OM1 90, m/dRCA 70; RFA access . Sheath removed by 21:30. RFA site appears stable. No signs of bleeding or hematoma.  It was recommended that Heparin gtt to be started 6 hours s/p sheath removal and transferred to  [facility] for CABG eval with Dr. Baldwin. (18 Oct 2018 00:43)      PAST MEDICAL & SURGICAL HISTORY:  CAD (coronary artery disease)  Umbilical hernia  Diabetes mellitus type 2 in nonobese  Paraumbilical hernia      MEDICATIONS  (STANDING):  aspirin enteric coated 81 milliGRAM(s) Oral daily  atorvastatin 40 milliGRAM(s) Oral at bedtime  cefuroxime  IVPB 1500 milliGRAM(s) IV Intermittent once  dextrose 5%. 1000 milliLiter(s) (50 mL/Hr) IV Continuous <Continuous>  dextrose 50% Injectable 12.5 Gram(s) IV Push once  dextrose 50% Injectable 25 Gram(s) IV Push once  dextrose 50% Injectable 25 Gram(s) IV Push once  heparin  Infusion 1000 Unit(s)/Hr (11 mL/Hr) IV Continuous <Continuous>  insulin glargine Injectable (LANTUS) 16 Unit(s) SubCutaneous at bedtime  insulin lispro (HumaLOG) corrective regimen sliding scale   SubCutaneous Before meals and at bedtime  insulin lispro Injectable (HumaLOG) 6 Unit(s) SubCutaneous three times a day before meals  metoprolol tartrate 25 milliGRAM(s) Oral two times a day  sodium chloride 0.9% lock flush 3 milliLiter(s) IV Push every 8 hours    MEDICATIONS  (PRN):  acetaminophen   Tablet .. 650 milliGRAM(s) Oral every 6 hours PRN Mild Pain (1 - 3)  dextrose 40% Gel 15 Gram(s) Oral once PRN Blood Glucose LESS THAN 70 milliGRAM(s)/deciLiter  glucagon  Injectable 1 milliGRAM(s) IntraMuscular once PRN Glucose <70 milliGRAM(s)/deciLiter  oxyCODONE    5 mG/acetaminophen 325 mG 2 Tablet(s) Oral every 6 hours PRN Severe Pain (7 - 10)  oxyCODONE    5 mG/acetaminophen 325 mG 1 Tablet(s) Oral every 4 hours PRN Moderate Pain (4 - 6)        Labs:                        16.2   8.4   )-----------( 296      ( 20 Oct 2018 05:48 )             47.3     10-20    135  |  101  |  14  ----------------------------<  166<H>  4.1   |  23  |  0.96    Ca    9.6      20 Oct 2018 05:48      PT/INR - ( 20 Oct 2018 05:48 )   PT: 11.4 sec;   INR: 1.05 ratio         PTT - ( 20 Oct 2018 05:48 )  PTT:73.2 sec    Blood Type: ABO Interpretation: B (10-19 @ 14:30)    HGB A1C: Hemoglobin A1C, Whole Blood: 9.0 % (10-16 @ 07:38)    Pro-BNP: Serum Pro-Brain Natriuretic Peptide: 63 pg/mL (10-18 @ 02:30)    Thyroid Panel: 10-20 @ 07:08/--  1.4/--/--  10-18 @ 05:38/4.71  --/7.3/125    MRSA: MRSA PCR Result.: Sole (10-18 @ 05:38)   / MSSA:       CXR:     EKG:    Carotid Duplex   no significant stenosis    Echocardiogram:Conclusions:  1. Normal left ventricular internal dimensions and wall  thicknesses.  2. Mild global left ventricular systolic dysfunction with  mild segmental abnormalities.  Mild hypokinesis of the base  to mid anteroseptal, septal walls.  3. Normal diastolic function  4. Normal right ventricular size and function.        Gen: WN/WD NAD  Neuro: AAOx3, nonfocal  Pulm: CTA B/L  CV: RRR, S1S2  Abd: Soft, NT, ND +BS  Ext: No edema, + peripheral pulses      Pt has AICD/PPM No         Pre-op Beta Blocker ordered within 24 hrs of surgery (CABG ONLYyes  Type & Cross  Yes   NPO after Midnight Yes  Pre-op ABX ordered, to be taped on chart:  Yes   Hibiclens/Peridex ordered Yes   Intraop on Hold: PRBCs, CXR, APRYL  Consent obtained  [ ] Yes  [ ] No Cardiac Surgery Pre-op Note:    CAD                                                                                                           Surgeon:  Denny    Procedure: cabg   10/22/18    Allergies    No Known Allergies    Intolerances  History of Present Illness:    58y Male WITH PMHx of current smoker, DM, Rt inguinal hernia repair and paraumbilical hernia repair who presented to [facility] ER with c/o chest pain x 8 hrs.  Pt notes after waking up 8 hrs, pt tried to get up out of bed and noticed left upper chest pain radiating to left upper back and shoulder described as moderate to severe aching pain. Patient was consulted by neurology for left arm and shoulder pain with associated numbness and tingling and weakness in his left arm. CT Head negative for any acute infarct.  Per neuro: DDX: brachial plexus stretch injury vs rotator cuff injury with nerve impingement. Would recommend PT and OT and complete cardiac workup and continue pain control as needed with NSAIDS and if no improvement in the next 2-3 weeks will perform outpatient EMG at [phone].     Patient was initially stayed in CDU for stress test which showed * Myocardial Perfusion SPECT results are abnormal at 90 % of MPHR. * There are medium sized, moderate defects in inferior and inferoseptal walls that are reversible, suggestive of ischemia. * Post-stress gated wall motion analysis was performed (LVEF = 52 %;LVEDV = 62 ml.), revealing mild hypokinesis of the inferior wall. RV function appeared normal.  RV function appeared normal. *** No previous Nuclear/Stress exam. Patient was admitted for abnormal stress test and underwent cardiac cath which showed: pLAD 70 with +IFR 0.89, OM1 90, m/dRCA 70; RFA access . Sheath removed by 21:30. RFA site appears stable. No signs of bleeding or hematoma.  It was recommended that Heparin gtt to be started 6 hours s/p sheath removal and transferred to  [facility] for CABG eval with Dr. Baldwin. (18 Oct 2018 00:43)      PAST MEDICAL & SURGICAL HISTORY:  CAD (coronary artery disease)  Umbilical hernia  Diabetes mellitus type 2 in nonobese  Paraumbilical hernia      MEDICATIONS  (STANDING):  aspirin enteric coated 81 milliGRAM(s) Oral daily  atorvastatin 40 milliGRAM(s) Oral at bedtime  cefuroxime  IVPB 1500 milliGRAM(s) IV Intermittent once  dextrose 5%. 1000 milliLiter(s) (50 mL/Hr) IV Continuous <Continuous>  dextrose 50% Injectable 12.5 Gram(s) IV Push once  dextrose 50% Injectable 25 Gram(s) IV Push once  dextrose 50% Injectable 25 Gram(s) IV Push once  heparin  Infusion 1000 Unit(s)/Hr (11 mL/Hr) IV Continuous <Continuous>  insulin glargine Injectable (LANTUS) 16 Unit(s) SubCutaneous at bedtime  insulin lispro (HumaLOG) corrective regimen sliding scale   SubCutaneous Before meals and at bedtime  insulin lispro Injectable (HumaLOG) 6 Unit(s) SubCutaneous three times a day before meals  metoprolol tartrate 25 milliGRAM(s) Oral two times a day  sodium chloride 0.9% lock flush 3 milliLiter(s) IV Push every 8 hours    MEDICATIONS  (PRN):  acetaminophen   Tablet .. 650 milliGRAM(s) Oral every 6 hours PRN Mild Pain (1 - 3)  dextrose 40% Gel 15 Gram(s) Oral once PRN Blood Glucose LESS THAN 70 milliGRAM(s)/deciLiter  glucagon  Injectable 1 milliGRAM(s) IntraMuscular once PRN Glucose <70 milliGRAM(s)/deciLiter  oxyCODONE    5 mG/acetaminophen 325 mG 2 Tablet(s) Oral every 6 hours PRN Severe Pain (7 - 10)  oxyCODONE    5 mG/acetaminophen 325 mG 1 Tablet(s) Oral every 4 hours PRN Moderate Pain (4 - 6)        Labs:                        16.2   8.4   )-----------( 296      ( 20 Oct 2018 05:48 )             47.3     10-20    135  |  101  |  14  ----------------------------<  166<H>  4.1   |  23  |  0.96    Ca    9.6      20 Oct 2018 05:48      PT/INR - ( 20 Oct 2018 05:48 )   PT: 11.4 sec;   INR: 1.05 ratio         PTT - ( 20 Oct 2018 05:48 )  PTT:73.2 sec    Blood Type: ABO Interpretation: B (10-19 @ 14:30)    HGB A1C: Hemoglobin A1C, Whole Blood: 9.0 % (10-16 @ 07:38)    Pro-BNP: Serum Pro-Brain Natriuretic Peptide: 63 pg/mL (10-18 @ 02:30)    Thyroid Panel: 10-20 @ 07:08/--  1.4/--/--  10-18 @ 05:38/4.71  --/7.3/125    MRSA: MRSA PCR Result.: Sole (10-18 @ 05:38)   / MSSA:       CXR: normal lungs    EKG:sr    Carotid Duplex   no significant stenosis    Echocardiogram:Conclusions:  1. Normal left ventricular internal dimensions and wall  thicknesses.  2. Mild global left ventricular systolic dysfunction with  mild segmental abnormalities.  Mild hypokinesis of the base  to mid anteroseptal, septal walls.  3. Normal diastolic function  4. Normal right ventricular size and function.        Gen: WN/WD NAD  Neuro: AAOx3, nonfocal  Pulm: CTA B/L  CV: RRR, S1S2  Abd: Soft, NT, ND +BS  Ext: No edema, + peripheral pulses      Pt has AICD/PPM No         Pre-op Beta Blocker ordered within 24 hrs of surgery (CABG ONLYyes  Type & Cross  Yes   NPO after Midnight Yes  Pre-op ABX ordered, to be taped on chart:  Yes   Hibiclens/Peridex ordered Yes   Intraop on Hold: PRBCs, CXR, APRYL  Consent obtained  [ ] Yes  [ ] No

## 2018-10-21 NOTE — PROGRESS NOTE ADULT - SUBJECTIVE AND OBJECTIVE BOX
INTERVAL HPI/OVERNIGHT EVENTS: I feel fine. Less shoulder pain now.   Vital Signs Last 24 Hrs  T(C): 36.7 (21 Oct 2018 12:11), Max: 36.8 (20 Oct 2018 13:26)  T(F): 98.1 (21 Oct 2018 12:11), Max: 98.2 (20 Oct 2018 13:26)  HR: 77 (21 Oct 2018 12:11) (70 - 94)  BP: 121/78 (21 Oct 2018 12:11) (106/72 - 123/77)  BP(mean): --  RR: 19 (21 Oct 2018 12:11) (18 - 19)  SpO2: 96% (21 Oct 2018 12:11) (94% - 96%)  I&O's Summary    20 Oct 2018 07:01  -  21 Oct 2018 07:00  --------------------------------------------------------  IN: 1132 mL / OUT: 1000 mL / NET: 132 mL    21 Oct 2018 07:01  -  21 Oct 2018 13:08  --------------------------------------------------------  IN: 415 mL / OUT: 0 mL / NET: 415 mL      MEDICATIONS  (STANDING):  aspirin enteric coated 81 milliGRAM(s) Oral daily  atorvastatin 40 milliGRAM(s) Oral at bedtime  cefuroxime  IVPB 1500 milliGRAM(s) IV Intermittent once  chlorhexidine 0.12% Liquid 5 milliLiter(s) Swish and Spit once  chlorhexidine 4% Liquid 1 Application(s) Topical once  dextrose 5%. 1000 milliLiter(s) (50 mL/Hr) IV Continuous <Continuous>  dextrose 50% Injectable 12.5 Gram(s) IV Push once  dextrose 50% Injectable 25 Gram(s) IV Push once  dextrose 50% Injectable 25 Gram(s) IV Push once  heparin  Infusion 1000 Unit(s)/Hr (11 mL/Hr) IV Continuous <Continuous>  insulin glargine Injectable (LANTUS) 16 Unit(s) SubCutaneous at bedtime  insulin lispro (HumaLOG) corrective regimen sliding scale   SubCutaneous Before meals and at bedtime  insulin lispro Injectable (HumaLOG) 8 Unit(s) SubCutaneous three times a day before meals  metoprolol tartrate 25 milliGRAM(s) Oral two times a day  sodium chloride 0.9% lock flush 3 milliLiter(s) IV Push every 8 hours    MEDICATIONS  (PRN):  acetaminophen   Tablet .. 650 milliGRAM(s) Oral every 6 hours PRN Mild Pain (1 - 3)  dextrose 40% Gel 15 Gram(s) Oral once PRN Blood Glucose LESS THAN 70 milliGRAM(s)/deciLiter  glucagon  Injectable 1 milliGRAM(s) IntraMuscular once PRN Glucose <70 milliGRAM(s)/deciLiter  oxyCODONE    5 mG/acetaminophen 325 mG 2 Tablet(s) Oral every 6 hours PRN Severe Pain (7 - 10)  oxyCODONE    5 mG/acetaminophen 325 mG 1 Tablet(s) Oral every 4 hours PRN Moderate Pain (4 - 6)    LABS:                        14.8   6.6   )-----------( 285      ( 21 Oct 2018 06:56 )             43.2     10-21    135  |  102  |  13  ----------------------------<  145<H>  4.0   |  21<L>  |  0.91    Ca    9.4      21 Oct 2018 06:51      PT/INR - ( 20 Oct 2018 05:48 )   PT: 11.4 sec;   INR: 1.05 ratio         PTT - ( 21 Oct 2018 06:56 )  PTT:73.2 sec    CAPILLARY BLOOD GLUCOSE      POCT Blood Glucose.: 156 mg/dL (21 Oct 2018 11:58)  POCT Blood Glucose.: 176 mg/dL (21 Oct 2018 07:38)  POCT Blood Glucose.: 169 mg/dL (20 Oct 2018 21:24)  POCT Blood Glucose.: 147 mg/dL (20 Oct 2018 16:13)          REVIEW OF SYSTEMS:  CONSTITUTIONAL: No fever, weight loss, or fatigue  EYES: No eye pain, visual disturbances, or discharge  ENMT:  No difficulty hearing, tinnitus, vertigo; No sinus or throat pain  NECK: No pain or stiffness  RESPIRATORY: No cough, wheezing, chills or hemoptysis; No shortness of breath  CARDIOVASCULAR: No chest pain, palpitations, dizziness, or leg swelling  GASTROINTESTINAL: No abdominal or epigastric pain. No nausea, vomiting, or hematemesis; No diarrhea or constipation. No melena or hematochezia.  GENITOURINARY: No dysuria, frequency, hematuria, or incontinence  NEUROLOGICAL: No headaches, memory loss, loss of strength, numbness, or tremors    Consultant(s) Notes Reviewed:  [x ] YES  [ ] NO    PHYSICAL EXAM:  GENERAL: NAD, well-groomed, well-developed, not in any distress ,  HEAD:  Atraumatic, Normocephalic  EYES: EOMI, PERRLA, conjunctiva and sclera clear  NECK: Supple, No JVD, Normal thyroid  NERVOUS SYSTEM:  Alert & Oriented X3, No focal deficit   CHEST/LUNG: Good air entry bilateral with no  rales, rhonchi, wheezing, or rubs  HEART: Regular rate and rhythm; No murmurs, rubs, or gallops  ABDOMEN: Soft, Nontender, Nondistended; Bowel sounds present  EXTREMITIES:  2+ Peripheral Pulses, No clubbing, cyanosis, or edema    Care Discussed with Consultants/Other Providers [ x] YES  [ ] NO

## 2018-10-21 NOTE — PROGRESS NOTE ADULT - PROBLEM SELECTOR PLAN 1
Simone miller OR in am   monday  npo after midnight OR in am   monday   heparin GTT to OR  npo after midnight

## 2018-10-21 NOTE — PROGRESS NOTE ADULT - SUBJECTIVE AND OBJECTIVE BOX
pt seen and examined, no complaints, ROS - .     acetaminophen   Tablet .. 650 milliGRAM(s) Oral every 6 hours PRN  aspirin enteric coated 81 milliGRAM(s) Oral daily  atorvastatin 40 milliGRAM(s) Oral at bedtime  cefuroxime  IVPB 1500 milliGRAM(s) IV Intermittent once  dextrose 40% Gel 15 Gram(s) Oral once PRN  dextrose 5%. 1000 milliLiter(s) IV Continuous <Continuous>  dextrose 50% Injectable 12.5 Gram(s) IV Push once  dextrose 50% Injectable 25 Gram(s) IV Push once  dextrose 50% Injectable 25 Gram(s) IV Push once  glucagon  Injectable 1 milliGRAM(s) IntraMuscular once PRN  heparin  Infusion 1000 Unit(s)/Hr IV Continuous <Continuous>  insulin glargine Injectable (LANTUS) 16 Unit(s) SubCutaneous at bedtime  insulin lispro (HumaLOG) corrective regimen sliding scale   SubCutaneous Before meals and at bedtime  insulin lispro Injectable (HumaLOG) 6 Unit(s) SubCutaneous three times a day before meals  metoprolol tartrate 25 milliGRAM(s) Oral two times a day  oxyCODONE    5 mG/acetaminophen 325 mG 2 Tablet(s) Oral every 6 hours PRN  oxyCODONE    5 mG/acetaminophen 325 mG 1 Tablet(s) Oral every 4 hours PRN  sodium chloride 0.9% lock flush 3 milliLiter(s) IV Push every 8 hours                            16.2   8.4   )-----------( 296      ( 20 Oct 2018 05:48 )             47.3       Hemoglobin: 16.2 g/dL (10-20 @ 05:48)  Hemoglobin: 15.5 g/dL (10-19 @ 06:14)  Hemoglobin: 15.8 g/dL (10-18 @ 16:03)  Hemoglobin: 14.8 g/dL (10-18 @ 02:30)  Hemoglobin: 14.5 g/dL (10-17 @ 16:30)      10-20    135  |  101  |  14  ----------------------------<  166<H>  4.1   |  23  |  0.96    Ca    9.6      20 Oct 2018 05:48      Creatinine Trend: 0.96<--, 0.91<--, 0.85<--, 1.06<--, 0.91<--    COAGS:     CARDIAC MARKERS ( 19 Oct 2018 14:32 )  x     / x     / 141 U/L / x     / 1.6 ng/mL        T(C): 36.8 (10-20-18 @ 20:23), Max: 36.8 (10-20-18 @ 13:26)  HR: 77 (10-20-18 @ 20:23) (77 - 94)  BP: 123/77 (10-20-18 @ 20:23) (106/72 - 123/77)  RR: 18 (10-20-18 @ 20:23) (18 - 18)  SpO2: 94% (10-20-18 @ 20:23) (94% - 94%)  Wt(kg): --    I&O's Summary    19 Oct 2018 07:01  -  20 Oct 2018 07:00  --------------------------------------------------------  IN: 724 mL / OUT: 400 mL / NET: 324 mL    20 Oct 2018 07:01  -  21 Oct 2018 05:24  --------------------------------------------------------  IN: 924 mL / OUT: 700 mL / NET: 224 mL        HEENT:   Normal oral mucosa, PERRL, EOMI	  Lymphatic: No lymphadenopathy , no edema  Cardiovascular: Normal S1 S2, No JVD, No murmurs , Peripheral pulses palpable 2+ bilaterally  Respiratory: Lungs clear to auscultation, normal effort 	  Gastrointestinal:  Soft, Non-tender, + BS	  Skin: No rashes, No ecchymoses, No cyanosis, warm to touch  Musculoskeletal: Normal range of motion, normal strength  Psychiatry:  Mood & affect appropriate    TELEMETRY: SR	      DIAGNOSTIC TESTING:  [ ] Echocardiogram: < from: Transthoracic Echocardiogram (10.18.18 @ 12:46) >  Conclusions:  1. Normal left ventricular internal dimensions and wall  thicknesses.  2. Mild global left ventricular systolic dysfunction with  mild segmental abnormalities.  Mild hypokinesis of the base  to mid anteroseptal, septal walls.  3. Normal diastolic function  4. Normal right ventricular size and function.  *** No previous Echo exam.    < end of copied text >    [ ]  Catheterization:  -Cath with severe stenosis in OM1, LAD (which was functionally significant by IFR of 0.89) and RCA    [ ] Stress Test:  < from: Nuclear Stress Test-Exercise (10.17.18 @ 09:43) >  IMPRESSIONS:Abnormal Study  * Myocardial Perfusion SPECT results are abnormal at 90 %  of MPHR.  * There are medium sized, moderate defects in inferior and  inferoseptal walls that are reversible, suggestive of  ischemia.  * Post-stress gated wall motion analysis was performed  (LVEF = 52 %;LVEDV = 62 ml.), revealing mild hypokinesis  of the inferior wall. RV function appeared normal.  RV  function appeared normal.  *** No previous Nuclear/Stress exam.    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     - tele stable , no arrythmia , cont BB   - ASA, statin    -hep gtt for CAD   -cabg on monday   -  GI / DVT prophylaxis,  keep K>4, mag >2.0   D/W Dr Hoffman

## 2018-10-21 NOTE — PROGRESS NOTE ADULT - SUBJECTIVE AND OBJECTIVE BOX
SUBJECTIVE: denies CP, SOB, Palps    MEDICATIONS  (STANDING):  aspirin enteric coated 81 milliGRAM(s) Oral daily  atorvastatin 40 milliGRAM(s) Oral at bedtime  cefuroxime  IVPB 1500 milliGRAM(s) IV Intermittent once  chlorhexidine 0.12% Liquid 5 milliLiter(s) Swish and Spit once  chlorhexidine 4% Liquid 1 Application(s) Topical once  dextrose 5%. 1000 milliLiter(s) (50 mL/Hr) IV Continuous <Continuous>  dextrose 50% Injectable 12.5 Gram(s) IV Push once  dextrose 50% Injectable 25 Gram(s) IV Push once  dextrose 50% Injectable 25 Gram(s) IV Push once  heparin  Infusion 1000 Unit(s)/Hr (11 mL/Hr) IV Continuous <Continuous>  insulin glargine Injectable (LANTUS) 16 Unit(s) SubCutaneous at bedtime  insulin lispro (HumaLOG) corrective regimen sliding scale   SubCutaneous Before meals and at bedtime  insulin lispro Injectable (HumaLOG) 8 Unit(s) SubCutaneous three times a day before meals  metoprolol tartrate 25 milliGRAM(s) Oral two times a day  sodium chloride 0.9% lock flush 3 milliLiter(s) IV Push every 8 hours    LABS:                        14.8   6.6   )-----------( 285      ( 21 Oct 2018 06:56 )             43.2   135  |  102  |  13  ----------------------------<  145<H>  4.0   |  21<L>  |  0.91    Ca    9.4      21 Oct 2018 06:51    Creatinine Trend: 0.91<--, 0.96<--, 0.91<--, 0.85<--, 1.06<--, 0.91<--   PTT - ( 21 Oct 2018 06:56 )  PTT:73.2 sec  CARDIAC MARKERS ( 19 Oct 2018 14:32 )  x     / x     / 141 U/L / x     / 1.6 ng/mL    PHYSICAL EXAM  Vital Signs Last 24 Hrs  T(C): 36.7 (21 Oct 2018 12:11), Max: 36.8 (20 Oct 2018 20:23)  T(F): 98.1 (21 Oct 2018 12:11), Max: 98.2 (20 Oct 2018 20:23)  HR: 77 (21 Oct 2018 12:11) (70 - 77)  BP: 121/78 (21 Oct 2018 12:11) (115/78 - 123/77)  RR: 19 (21 Oct 2018 12:11) (18 - 19)  SpO2: 96% (21 Oct 2018 12:11) (94% - 96%)    Lymphatic: No obvious lymphadenopathy, No edema  Cardiovascular: Normal S1S2, No JVD, 1/6 LOS, Peripheral pulses palpable 2+ B/L  Respiratory: Lungs clear to auscultation, normal effort  Gastrointestinal: Abdomen soft, ND, NT, +BS  Musculoskeletal: + pain/discomfort with movement of the left arm at the shoulder.     DIAGNOSTIC DATA  TELEMETRY: Continues NSR 70's-80's. No arrhthymias    RADIOLOGY:   < from: Xray Chest 2 Views PA/Lat (10.18.18 @ 11:56) >  IMPRESSION:   Clear lungs.         ASSESSMENT AND PLAN:  58yMale with history of DM, Tobacco abuse, FHx of CAD admitted with chest pain and abnormal NST s/p cath found to have 3VD now pending CTS     -- Pending CABG w/ Dr. Baldwin on Monday  -- Will continue to monitor patient from EP perspective, pt with sinus tachycardia in the setting of 3v CAD  -- will cont to monitor tele to r/o ventricular arrythmias SUBJECTIVE: denies CP, SOB, Palps    MEDICATIONS  (STANDING):  aspirin enteric coated 81 milliGRAM(s) Oral daily  atorvastatin 40 milliGRAM(s) Oral at bedtime  cefuroxime  IVPB 1500 milliGRAM(s) IV Intermittent once  chlorhexidine 0.12% Liquid 5 milliLiter(s) Swish and Spit once  chlorhexidine 4% Liquid 1 Application(s) Topical once  dextrose 5%. 1000 milliLiter(s) (50 mL/Hr) IV Continuous <Continuous>  dextrose 50% Injectable 12.5 Gram(s) IV Push once  dextrose 50% Injectable 25 Gram(s) IV Push once  dextrose 50% Injectable 25 Gram(s) IV Push once  heparin  Infusion 1000 Unit(s)/Hr (11 mL/Hr) IV Continuous <Continuous>  insulin glargine Injectable (LANTUS) 16 Unit(s) SubCutaneous at bedtime  insulin lispro (HumaLOG) corrective regimen sliding scale   SubCutaneous Before meals and at bedtime  insulin lispro Injectable (HumaLOG) 8 Unit(s) SubCutaneous three times a day before meals  metoprolol tartrate 25 milliGRAM(s) Oral two times a day  sodium chloride 0.9% lock flush 3 milliLiter(s) IV Push every 8 hours    LABS:                        14.8   6.6   )-----------( 285      ( 21 Oct 2018 06:56 )             43.2   135  |  102  |  13  ----------------------------<  145<H>  4.0   |  21<L>  |  0.91    Ca    9.4      21 Oct 2018 06:51    Creatinine Trend: 0.91<--, 0.96<--, 0.91<--, 0.85<--, 1.06<--, 0.91<--   PTT - ( 21 Oct 2018 06:56 )  PTT:73.2 sec  CARDIAC MARKERS ( 19 Oct 2018 14:32 )  x     / x     / 141 U/L / x     / 1.6 ng/mL    PHYSICAL EXAM  Vital Signs Last 24 Hrs  T(C): 36.7 (21 Oct 2018 12:11), Max: 36.8 (20 Oct 2018 20:23)  T(F): 98.1 (21 Oct 2018 12:11), Max: 98.2 (20 Oct 2018 20:23)  HR: 77 (21 Oct 2018 12:11) (70 - 77)  BP: 121/78 (21 Oct 2018 12:11) (115/78 - 123/77)  RR: 19 (21 Oct 2018 12:11) (18 - 19)  SpO2: 96% (21 Oct 2018 12:11) (94% - 96%)    Lymphatic: No obvious lymphadenopathy, No edema  Cardiovascular: Normal S1S2, No JVD, 1/6 LOS, Peripheral pulses palpable 2+ B/L  Respiratory: Lungs clear to auscultation, normal effort  Gastrointestinal: Abdomen soft, ND, NT, +BS  Musculoskeletal: + pain/discomfort with movement of the left arm at the shoulder.     DIAGNOSTIC DATA  TELEMETRY: Continues NSR 70's-80's. No arrhthymias    RADIOLOGY:   < from: Xray Chest 2 Views PA/Lat (10.18.18 @ 11:56) >  IMPRESSION:   Clear lungs.         ASSESSMENT AND PLAN:  58yMale with history of DM, Tobacco abuse, FHx of CAD admitted with chest pain and abnormal NST s/p cath found to have 3VD now pending CTS     -- Pending CABG w/ Dr. Baldwin on Monday  -- Will continue to monitor patient from EP perspective, pt with sinus tachycardia in the setting of 3v CAD  -- will cont to monitor tele to r/o ventricular arrythmias given palpitations

## 2018-10-21 NOTE — PROGRESS NOTE ADULT - ASSESSMENT
Assessment  DMT2: 58y Male with DM T2 with hyperglycemia on insulin, dose adjusted, blood sugars improving, no hypoglycemic episode,  eating meals,  compliant with low carb diet.  CAD: Planing surgery tomorrow, on medications, stable, monitored.  HTN: Controlled, On med.  HLD: On statin      Case discussed with the NP  Dr Alford [phone]

## 2018-10-21 NOTE — PROGRESS NOTE ADULT - PROBLEM SELECTOR PLAN 1
since going for surgery tomorrow keep  Lantus to  16 units , increase Humalog 8-8-8. Will continue monitoring FS, log, will Follow up.  Patient counseled for compliance with consistent low carb diet.

## 2018-10-21 NOTE — PROGRESS NOTE ADULT - ASSESSMENT
58y Male WITH PMHx of current smoker, DM, Rt inguinal hernia repair and paraumbilical hernia repair who presented to [facility] ER with c/o chest pain x 8 hrs.  Pt notes after waking up 8 hrs, pt tried to get up out of bed and noticed left upper chest pain radiating to left upper back and shoulder described as moderate to severe aching pain. Patient was consulted by neurology for left arm and shoulder pain with associated numbness and tingling and weakness in his left arm. CT Head negative for any acute infarct.  Per neuro: DDX: brachial plexus stretch injury vs rotator cuff injury with nerve impingement. Would recommend PT and OT and complete cardiac workup and continue pain control as needed with NSAIDS and if no improvement in the next 2-3 weeks will perform outpatient EMG at [phone].     Patient was initially stayed in CDU for stress test which showed * Myocardial Perfusion SPECT results are abnormal at 90 % of MPHR. * There are medium sized, moderate defects in inferior and inferoseptal walls that are reversible, suggestive of ischemia. * Post-stress gated wall motion analysis was performed (LVEF = 52 %;LVEDV = 62 ml.), revealing mild hypokinesis of the inferior wall. RV function appeared normal.  RV function appeared normal. *** No previous Nuclear/Stress exam. Patient was admitted for abnormal stress test and underwent cardiac cath which showed: pLAD 70 with +IFR 0.89, OM1 90, m/dRCA 70; RFA access . Sheath removed by 21:30. RFA site appears stable. No signs of bleeding or hematoma. Good pedal pulse.  It was recommended that Heparin gtt to be started 6 hours s/p sheath removal and transferred to  [facility] for CABG eval with Dr. Baldwin.    10/18 Pt doing well no issues overnight.  No CP, SOB.  Has some R Arm and shoulder pain from prior nerve impingement.  Continue pre-op work-up with plan for CABG next week.  10/19 IS CP free, no issues overnight awating OR Monday.  Heparin gtt theraputic 58y Male WITH PMHx of current smoker, DM, Rt inguinal hernia repair and paraumbilical hernia repair who presented to [facility] ER with c/o chest pain x 8 hrs.  Pt notes after waking up 8 hrs, pt tried to get up out of bed and noticed left upper chest pain radiating to left upper back and shoulder described as moderate to severe aching pain. Patient was consulted by neurology for left arm and shoulder pain with associated numbness and tingling and weakness in his left arm. CT Head negative for any acute infarct.  Per neuro: DDX: brachial plexus stretch injury vs rotator cuff injury with nerve impingement. Would recommend PT and OT and complete cardiac workup and continue pain control as needed with NSAIDS and if no improvement in the next 2-3 weeks will perform outpatient EMG at [phone].     Patient was initially stayed in CDU for stress test which showed * Myocardial Perfusion SPECT results are abnormal at 90 % of MPHR. * There are medium sized, moderate defects in inferior and inferoseptal walls that are reversible, suggestive of ischemia. * Post-stress gated wall motion analysis was performed (LVEF = 52 %;LVEDV = 62 ml.), revealing mild hypokinesis of the inferior wall. RV function appeared normal.  RV function appeared normal. *** No previous Nuclear/Stress exam. Patient was admitted for abnormal stress test and underwent cardiac cath which showed: pLAD 70 with +IFR 0.89, OM1 90, m/dRCA 70; RFA access . Sheath removed by 21:30. RFA site appears stable. No signs of bleeding or hematoma. Good pedal pulse.  It was recommended that Heparin gtt to be started 6 hours s/p sheath removal and transferred to  [facility] for CABG eval with Dr. Baldwin.    10/18 Pt doing well no issues overnight.  No CP, SOB.  Has some R Arm and shoulder pain from prior nerve impingement.  Continue pre-op work-up with plan for CABG next week.  10/19 IS CP free, no issues overnight awating OR Monday.  Heparin gtt theraputic   10/21   VSS 58 yr old male with CAD   co brachial plexus pain  possible rotator cuff tear with impingement with xray negative    10/18 Pt doing well no issues overnight.  No CP, SOB.  Has some R Arm and shoulder pain from prior nerve impingement.  Continue pre-op work-up with plan for CABG   10/19 IS CP free, no issues overnight awating OR Monday.  Heparin gtt theraputic   10/21   VSS   npo after midnight 58 yr old male with CAD   co   lt shoulder pain  brachial plexus pain  possible rotator cuff tear with impingement with xray revealing calcified tendinosis.    10/18 Pt doing well no issues overnight.  No CP, SOB.  Has some R Arm and shoulder pain from prior nerve impingement.  Continue pre-op work-up with plan for CABG   10/19 IS CP free, no issues overnight awating OR Monday.  Heparin gtt theraputic   10/21   VSS   npo after midnight

## 2018-10-21 NOTE — PROGRESS NOTE ADULT - SUBJECTIVE AND OBJECTIVE BOX
Patient denies CP, SOB,  Review of systems otherwise (-)    acetaminophen   Tablet .. 650 milliGRAM(s) Oral every 6 hours PRN  aspirin enteric coated 81 milliGRAM(s) Oral daily  atorvastatin 40 milliGRAM(s) Oral at bedtime  cefuroxime  IVPB 1500 milliGRAM(s) IV Intermittent once  chlorhexidine 0.12% Liquid 5 milliLiter(s) Swish and Spit once  chlorhexidine 4% Liquid 1 Application(s) Topical once  dextrose 40% Gel 15 Gram(s) Oral once PRN  dextrose 5%. 1000 milliLiter(s) IV Continuous <Continuous>  dextrose 50% Injectable 12.5 Gram(s) IV Push once  dextrose 50% Injectable 25 Gram(s) IV Push once  dextrose 50% Injectable 25 Gram(s) IV Push once  glucagon  Injectable 1 milliGRAM(s) IntraMuscular once PRN  heparin  Infusion 1000 Unit(s)/Hr IV Continuous <Continuous>  insulin glargine Injectable (LANTUS) 16 Unit(s) SubCutaneous at bedtime  insulin lispro (HumaLOG) corrective regimen sliding scale   SubCutaneous Before meals and at bedtime  insulin lispro Injectable (HumaLOG) 8 Unit(s) SubCutaneous three times a day before meals  metoprolol tartrate 25 milliGRAM(s) Oral two times a day  oxyCODONE    5 mG/acetaminophen 325 mG 2 Tablet(s) Oral every 6 hours PRN  oxyCODONE    5 mG/acetaminophen 325 mG 1 Tablet(s) Oral every 4 hours PRN  sodium chloride 0.9% lock flush 3 milliLiter(s) IV Push every 8 hours                            14.8   6.6   )-----------( 285      ( 21 Oct 2018 06:56 )             43.2       Hemoglobin: 14.8 g/dL (10-21 @ 06:56)  Hemoglobin: 16.2 g/dL (10-20 @ 05:48)  Hemoglobin: 15.5 g/dL (10-19 @ 06:14)  Hemoglobin: 15.8 g/dL (10-18 @ 16:03)  Hemoglobin: 14.8 g/dL (10-18 @ 02:30)      10-21    135  |  102  |  13  ----------------------------<  145<H>  4.0   |  21<L>  |  0.91    Ca    9.4      21 Oct 2018 06:51      Creatinine Trend: 0.91<--, 0.96<--, 0.91<--, 0.85<--, 1.06<--, 0.91<--    COAGS: PTT - ( 21 Oct 2018 06:56 )  PTT:73.2 sec    CARDIAC MARKERS ( 19 Oct 2018 14:32 )  x     / x     / 141 U/L / x     / 1.6 ng/mL        T(C): 36.7 (10-21-18 @ 12:11), Max: 36.8 (10-20-18 @ 20:23)  HR: 77 (10-21-18 @ 12:11) (70 - 77)  BP: 121/78 (10-21-18 @ 12:11) (115/78 - 123/77)  RR: 19 (10-21-18 @ 12:11) (18 - 19)  SpO2: 96% (10-21-18 @ 12:11) (94% - 96%)  Wt(kg): --    I&O's Summary    20 Oct 2018 07:01  -  21 Oct 2018 07:00  --------------------------------------------------------  IN: 1132 mL / OUT: 1000 mL / NET: 132 mL    21 Oct 2018 07:01  -  21 Oct 2018 13:45  --------------------------------------------------------  IN: 655 mL / OUT: 0 mL / NET: 655 mL        Appearance: Normal	  HEENT:   Normal oral mucosa, PERRL, EOMI	  Lymphatic: No lymphadenopathy , no edema  Cardiovascular: Normal S1 S2, No JVD, No murmurs , Peripheral pulses palpable 2+ bilaterally  Respiratory: Lungs clear to auscultation, normal effort 	  Gastrointestinal:  Soft, Non-tender, + BS	  Skin: No rashes, No ecchymoses, No cyanosis, warm to touch  Musculoskeletal: Normal range of motion, normal strength  Psychiatry:  Mood & affect appropriate    TELEMETRY: SR	      DIAGNOSTIC TESTING:  [ ] Echocardiogram: < from: Transthoracic Echocardiogram (10.18.18 @ 12:46) >  Conclusions:  1. Normal left ventricular internal dimensions and wall  thicknesses.  2. Mild global left ventricular systolic dysfunction with  mild segmental abnormalities.  Mild hypokinesis of the base  to mid anteroseptal, septal walls.  3. Normal diastolic function  4. Normal right ventricular size and function.  *** No previous Echo exam.    < end of copied text >    [ ]  Catheterization:  -Cath with severe stenosis in OM1, LAD (which was functionally significant by IFR of 0.89) and RCA    [ ] Stress Test:  < from: Nuclear Stress Test-Exercise (10.17.18 @ 09:43) >  IMPRESSIONS:Abnormal Study  * Myocardial Perfusion SPECT results are abnormal at 90 %  of MPHR.  * There are medium sized, moderate defects in inferior and  inferoseptal walls that are reversible, suggestive of  ischemia.  * Post-stress gated wall motion analysis was performed  (LVEF = 52 %;LVEDV = 62 ml.), revealing mild hypokinesis  of the inferior wall. RV function appeared normal.  RV  function appeared normal.  *** No previous Nuclear/Stress exam.    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     - awaiting CABG monday  - No clinical CHF  - No pertinent findings on tele  - cont Asa Statin BB    León Gibson MD, FACC

## 2018-10-22 ENCOUNTER — APPOINTMENT (OUTPATIENT)
Dept: CARDIOTHORACIC SURGERY | Facility: HOSPITAL | Age: 58
End: 2018-10-22

## 2018-10-22 LAB
ALBUMIN SERPL ELPH-MCNC: 3 G/DL — LOW (ref 3.3–5)
ALP SERPL-CCNC: 46 U/L — SIGNIFICANT CHANGE UP (ref 40–120)
ALT FLD-CCNC: 115 U/L — HIGH (ref 10–45)
ANION GAP SERPL CALC-SCNC: 13 MMOL/L — SIGNIFICANT CHANGE UP (ref 5–17)
ANION GAP SERPL CALC-SCNC: 15 MMOL/L — SIGNIFICANT CHANGE UP (ref 5–17)
APTT BLD: 29.3 SEC — SIGNIFICANT CHANGE UP (ref 27.5–37.4)
APTT BLD: 76.5 SEC — HIGH (ref 27.5–37.4)
AST SERPL-CCNC: 155 U/L — HIGH (ref 10–40)
BASOPHILS # BLD AUTO: 0 K/UL — SIGNIFICANT CHANGE UP (ref 0–0.2)
BASOPHILS NFR BLD AUTO: 0.2 % — SIGNIFICANT CHANGE UP (ref 0–2)
BILIRUB SERPL-MCNC: 1.1 MG/DL — SIGNIFICANT CHANGE UP (ref 0.2–1.2)
BUN SERPL-MCNC: 10 MG/DL — SIGNIFICANT CHANGE UP (ref 7–23)
BUN SERPL-MCNC: 14 MG/DL — SIGNIFICANT CHANGE UP (ref 7–23)
CALCIUM SERPL-MCNC: 7.9 MG/DL — LOW (ref 8.4–10.5)
CALCIUM SERPL-MCNC: 9.6 MG/DL — SIGNIFICANT CHANGE UP (ref 8.4–10.5)
CHLORIDE SERPL-SCNC: 102 MMOL/L — SIGNIFICANT CHANGE UP (ref 96–108)
CHLORIDE SERPL-SCNC: 105 MMOL/L — SIGNIFICANT CHANGE UP (ref 96–108)
CK MB BLD-MCNC: 7.2 % — HIGH (ref 0–3.5)
CK MB CFR SERPL CALC: 16.8 NG/ML — HIGH (ref 0–6.7)
CK SERPL-CCNC: 234 U/L — HIGH (ref 30–200)
CO2 SERPL-SCNC: 21 MMOL/L — LOW (ref 22–31)
CO2 SERPL-SCNC: 22 MMOL/L — SIGNIFICANT CHANGE UP (ref 22–31)
CREAT SERPL-MCNC: 0.78 MG/DL — SIGNIFICANT CHANGE UP (ref 0.5–1.3)
CREAT SERPL-MCNC: 0.83 MG/DL — SIGNIFICANT CHANGE UP (ref 0.5–1.3)
EOSINOPHIL # BLD AUTO: 0.1 K/UL — SIGNIFICANT CHANGE UP (ref 0–0.5)
EOSINOPHIL NFR BLD AUTO: 0.7 % — SIGNIFICANT CHANGE UP (ref 0–6)
GAS PNL BLDA: SIGNIFICANT CHANGE UP
GLUCOSE SERPL-MCNC: 130 MG/DL — HIGH (ref 70–99)
GLUCOSE SERPL-MCNC: 140 MG/DL — HIGH (ref 70–99)
HCT VFR BLD CALC: 33.3 % — LOW (ref 39–50)
HCT VFR BLD CALC: 43.1 % — SIGNIFICANT CHANGE UP (ref 39–50)
HGB BLD-MCNC: 11.7 G/DL — LOW (ref 13–17)
HGB BLD-MCNC: 14.8 G/DL — SIGNIFICANT CHANGE UP (ref 13–17)
INR BLD: 1.23 RATIO — HIGH (ref 0.88–1.16)
LYMPHOCYTES # BLD AUTO: 1.6 K/UL — SIGNIFICANT CHANGE UP (ref 1–3.3)
LYMPHOCYTES # BLD AUTO: 18.3 % — SIGNIFICANT CHANGE UP (ref 13–44)
MAGNESIUM SERPL-MCNC: 2.8 MG/DL — HIGH (ref 1.6–2.6)
MCHC RBC-ENTMCNC: 30.8 PG — SIGNIFICANT CHANGE UP (ref 27–34)
MCHC RBC-ENTMCNC: 31.9 PG — SIGNIFICANT CHANGE UP (ref 27–34)
MCHC RBC-ENTMCNC: 34.4 GM/DL — SIGNIFICANT CHANGE UP (ref 32–36)
MCHC RBC-ENTMCNC: 35.3 GM/DL — SIGNIFICANT CHANGE UP (ref 32–36)
MCV RBC AUTO: 89.5 FL — SIGNIFICANT CHANGE UP (ref 80–100)
MCV RBC AUTO: 90.3 FL — SIGNIFICANT CHANGE UP (ref 80–100)
MONOCYTES # BLD AUTO: 0.5 K/UL — SIGNIFICANT CHANGE UP (ref 0–0.9)
MONOCYTES NFR BLD AUTO: 5.7 % — SIGNIFICANT CHANGE UP (ref 2–14)
NEUTROPHILS # BLD AUTO: 6.8 K/UL — SIGNIFICANT CHANGE UP (ref 1.8–7.4)
NEUTROPHILS NFR BLD AUTO: 75.1 % — SIGNIFICANT CHANGE UP (ref 43–77)
PHOSPHATE SERPL-MCNC: 3.3 MG/DL — SIGNIFICANT CHANGE UP (ref 2.5–4.5)
PLATELET # BLD AUTO: 164 K/UL — SIGNIFICANT CHANGE UP (ref 150–400)
PLATELET # BLD AUTO: 288 K/UL — SIGNIFICANT CHANGE UP (ref 150–400)
POTASSIUM SERPL-MCNC: 3.8 MMOL/L — SIGNIFICANT CHANGE UP (ref 3.5–5.3)
POTASSIUM SERPL-MCNC: 4.4 MMOL/L — SIGNIFICANT CHANGE UP (ref 3.5–5.3)
POTASSIUM SERPL-SCNC: 3.8 MMOL/L — SIGNIFICANT CHANGE UP (ref 3.5–5.3)
POTASSIUM SERPL-SCNC: 4.4 MMOL/L — SIGNIFICANT CHANGE UP (ref 3.5–5.3)
PROT SERPL-MCNC: 4.8 G/DL — LOW (ref 6–8.3)
PROTHROM AB SERPL-ACNC: 13.5 SEC — HIGH (ref 9.8–12.7)
RBC # BLD: 3.69 M/UL — LOW (ref 4.2–5.8)
RBC # BLD: 4.81 M/UL — SIGNIFICANT CHANGE UP (ref 4.2–5.8)
RBC # FLD: 12 % — SIGNIFICANT CHANGE UP (ref 10.3–14.5)
RBC # FLD: 12.7 % — SIGNIFICANT CHANGE UP (ref 10.3–14.5)
SODIUM SERPL-SCNC: 138 MMOL/L — SIGNIFICANT CHANGE UP (ref 135–145)
SODIUM SERPL-SCNC: 140 MMOL/L — SIGNIFICANT CHANGE UP (ref 135–145)
TROPONIN T, HIGH SENSITIVITY RESULT: 371 NG/L — HIGH (ref 0–51)
WBC # BLD: 6.4 K/UL — SIGNIFICANT CHANGE UP (ref 3.8–10.5)
WBC # BLD: 9 K/UL — SIGNIFICANT CHANGE UP (ref 3.8–10.5)
WBC # FLD AUTO: 6.4 K/UL — SIGNIFICANT CHANGE UP (ref 3.8–10.5)
WBC # FLD AUTO: 9 K/UL — SIGNIFICANT CHANGE UP (ref 3.8–10.5)

## 2018-10-22 PROCEDURE — 33533 CABG ARTERIAL SINGLE: CPT

## 2018-10-22 PROCEDURE — 71045 X-RAY EXAM CHEST 1 VIEW: CPT | Mod: 26

## 2018-10-22 PROCEDURE — 33518 CABG ARTERY-VEIN TWO: CPT

## 2018-10-22 PROCEDURE — 93010 ELECTROCARDIOGRAM REPORT: CPT

## 2018-10-22 PROCEDURE — 33508 ENDOSCOPIC VEIN HARVEST: CPT

## 2018-10-22 PROCEDURE — 33641 REPAIR HEART SEPTUM DEFECT: CPT

## 2018-10-22 RX ORDER — INSULIN HUMAN 100 [IU]/ML
3 INJECTION, SOLUTION SUBCUTANEOUS
Qty: 100 | Refills: 0 | Status: DISCONTINUED | OUTPATIENT
Start: 2018-10-22 | End: 2018-10-23

## 2018-10-22 RX ORDER — POTASSIUM CHLORIDE 20 MEQ
10 PACKET (EA) ORAL
Qty: 0 | Refills: 0 | Status: DISCONTINUED | OUTPATIENT
Start: 2018-10-22 | End: 2018-10-23

## 2018-10-22 RX ORDER — SODIUM CHLORIDE 9 MG/ML
1000 INJECTION INTRAMUSCULAR; INTRAVENOUS; SUBCUTANEOUS
Qty: 0 | Refills: 0 | Status: DISCONTINUED | OUTPATIENT
Start: 2018-10-22 | End: 2018-10-27

## 2018-10-22 RX ORDER — POTASSIUM CHLORIDE 20 MEQ
10 PACKET (EA) ORAL
Qty: 0 | Refills: 0 | Status: COMPLETED | OUTPATIENT
Start: 2018-10-22 | End: 2018-10-22

## 2018-10-22 RX ORDER — METOCLOPRAMIDE HCL 10 MG
10 TABLET ORAL EVERY 8 HOURS
Qty: 0 | Refills: 0 | Status: COMPLETED | OUTPATIENT
Start: 2018-10-22 | End: 2018-10-24

## 2018-10-22 RX ORDER — DEXMEDETOMIDINE HYDROCHLORIDE IN 0.9% SODIUM CHLORIDE 4 UG/ML
0.7 INJECTION INTRAVENOUS
Qty: 200 | Refills: 0 | Status: DISCONTINUED | OUTPATIENT
Start: 2018-10-22 | End: 2018-10-23

## 2018-10-22 RX ORDER — NICARDIPINE HYDROCHLORIDE 30 MG/1
5 CAPSULE, EXTENDED RELEASE ORAL
Qty: 40 | Refills: 0 | Status: DISCONTINUED | OUTPATIENT
Start: 2018-10-22 | End: 2018-10-23

## 2018-10-22 RX ORDER — HYDROMORPHONE HYDROCHLORIDE 2 MG/ML
0.5 INJECTION INTRAMUSCULAR; INTRAVENOUS; SUBCUTANEOUS ONCE
Qty: 0 | Refills: 0 | Status: DISCONTINUED | OUTPATIENT
Start: 2018-10-22 | End: 2018-10-23

## 2018-10-22 RX ORDER — DOCUSATE SODIUM 100 MG
100 CAPSULE ORAL THREE TIMES A DAY
Qty: 0 | Refills: 0 | Status: DISCONTINUED | OUTPATIENT
Start: 2018-10-22 | End: 2018-10-27

## 2018-10-22 RX ORDER — MEPERIDINE HYDROCHLORIDE 50 MG/ML
25 INJECTION INTRAMUSCULAR; INTRAVENOUS; SUBCUTANEOUS ONCE
Qty: 0 | Refills: 0 | Status: DISCONTINUED | OUTPATIENT
Start: 2018-10-22 | End: 2018-10-23

## 2018-10-22 RX ORDER — PROPOFOL 10 MG/ML
20 INJECTION, EMULSION INTRAVENOUS
Qty: 500 | Refills: 0 | Status: DISCONTINUED | OUTPATIENT
Start: 2018-10-22 | End: 2018-10-23

## 2018-10-22 RX ORDER — CHLORHEXIDINE GLUCONATE 213 G/1000ML
5 SOLUTION TOPICAL EVERY 4 HOURS
Qty: 0 | Refills: 0 | Status: DISCONTINUED | OUTPATIENT
Start: 2018-10-22 | End: 2018-10-23

## 2018-10-22 RX ORDER — SODIUM CHLORIDE 9 MG/ML
250 INJECTION, SOLUTION INTRAVENOUS ONCE
Qty: 0 | Refills: 0 | Status: COMPLETED | OUTPATIENT
Start: 2018-10-22 | End: 2018-10-22

## 2018-10-22 RX ORDER — DOBUTAMINE HCL 250MG/20ML
2 VIAL (ML) INTRAVENOUS
Qty: 500 | Refills: 0 | Status: DISCONTINUED | OUTPATIENT
Start: 2018-10-22 | End: 2018-10-23

## 2018-10-22 RX ORDER — PANTOPRAZOLE SODIUM 20 MG/1
40 TABLET, DELAYED RELEASE ORAL DAILY
Qty: 0 | Refills: 0 | Status: DISCONTINUED | OUTPATIENT
Start: 2018-10-22 | End: 2018-10-23

## 2018-10-22 RX ORDER — DEXTROSE 50 % IN WATER 50 %
50 SYRINGE (ML) INTRAVENOUS
Qty: 0 | Refills: 0 | Status: DISCONTINUED | OUTPATIENT
Start: 2018-10-22 | End: 2018-10-27

## 2018-10-22 RX ORDER — ALBUMIN HUMAN 25 %
250 VIAL (ML) INTRAVENOUS ONCE
Qty: 0 | Refills: 0 | Status: COMPLETED | OUTPATIENT
Start: 2018-10-22 | End: 2018-10-22

## 2018-10-22 RX ORDER — INSULIN HUMAN 100 [IU]/ML
1 INJECTION, SOLUTION SUBCUTANEOUS
Qty: 100 | Refills: 0 | Status: DISCONTINUED | OUTPATIENT
Start: 2018-10-22 | End: 2018-10-27

## 2018-10-22 RX ORDER — CEFUROXIME AXETIL 250 MG
1500 TABLET ORAL EVERY 8 HOURS
Qty: 0 | Refills: 0 | Status: COMPLETED | OUTPATIENT
Start: 2018-10-22 | End: 2018-10-24

## 2018-10-22 RX ORDER — DEXTROSE 50 % IN WATER 50 %
25 SYRINGE (ML) INTRAVENOUS
Qty: 0 | Refills: 0 | Status: DISCONTINUED | OUTPATIENT
Start: 2018-10-22 | End: 2018-10-27

## 2018-10-22 RX ADMIN — Medication 100 MILLIEQUIVALENT(S): at 23:00

## 2018-10-22 RX ADMIN — Medication 10 MILLIGRAM(S): at 22:26

## 2018-10-22 RX ADMIN — CHLORHEXIDINE GLUCONATE 5 MILLILITER(S): 213 SOLUTION TOPICAL at 22:26

## 2018-10-22 RX ADMIN — Medication 125 MILLILITER(S): at 22:42

## 2018-10-22 RX ADMIN — HEPARIN SODIUM 11 UNIT(S)/HR: 5000 INJECTION INTRAVENOUS; SUBCUTANEOUS at 10:14

## 2018-10-22 RX ADMIN — Medication 100 MILLIEQUIVALENT(S): at 23:31

## 2018-10-22 RX ADMIN — OXYCODONE AND ACETAMINOPHEN 2 TABLET(S): 5; 325 TABLET ORAL at 07:00

## 2018-10-22 RX ADMIN — OXYCODONE AND ACETAMINOPHEN 2 TABLET(S): 5; 325 TABLET ORAL at 05:12

## 2018-10-22 RX ADMIN — Medication 25 MILLIGRAM(S): at 05:12

## 2018-10-22 RX ADMIN — SODIUM CHLORIDE 1500 MILLILITER(S): 9 INJECTION, SOLUTION INTRAVENOUS at 22:49

## 2018-10-22 RX ADMIN — PANTOPRAZOLE SODIUM 40 MILLIGRAM(S): 20 TABLET, DELAYED RELEASE ORAL at 22:26

## 2018-10-22 RX ADMIN — Medication 100 MILLIEQUIVALENT(S): at 19:15

## 2018-10-22 RX ADMIN — Medication 100 MILLIGRAM(S): at 20:49

## 2018-10-22 NOTE — PROGRESS NOTE ADULT - SUBJECTIVE AND OBJECTIVE BOX
EP ATTENDING    tele: NSR, no events    no palpitations, no syncope, no angina    acetaminophen   Tablet .. 650 milliGRAM(s) Oral every 6 hours PRN  aspirin enteric coated 81 milliGRAM(s) Oral daily  atorvastatin 40 milliGRAM(s) Oral at bedtime  cefuroxime  IVPB 1500 milliGRAM(s) IV Intermittent once  cefuroxime  IVPB 1500 milliGRAM(s) IV Intermittent once  chlorhexidine 0.12% Liquid 5 milliLiter(s) Swish and Spit once  dextrose 40% Gel 15 Gram(s) Oral once PRN  dextrose 5%. 1000 milliLiter(s) IV Continuous <Continuous>  dextrose 50% Injectable 12.5 Gram(s) IV Push once  dextrose 50% Injectable 25 Gram(s) IV Push once  dextrose 50% Injectable 25 Gram(s) IV Push once  glucagon  Injectable 1 milliGRAM(s) IntraMuscular once PRN  heparin  Infusion 1000 Unit(s)/Hr IV Continuous <Continuous>  insulin glargine Injectable (LANTUS) 16 Unit(s) SubCutaneous at bedtime  insulin lispro (HumaLOG) corrective regimen sliding scale   SubCutaneous Before meals and at bedtime  insulin lispro Injectable (HumaLOG) 8 Unit(s) SubCutaneous three times a day before meals  metoprolol tartrate 25 milliGRAM(s) Oral two times a day  oxyCODONE    5 mG/acetaminophen 325 mG 2 Tablet(s) Oral every 6 hours PRN  oxyCODONE    5 mG/acetaminophen 325 mG 1 Tablet(s) Oral every 4 hours PRN  sodium chloride 0.9% lock flush 3 milliLiter(s) IV Push every 8 hours                            14.8   6.4   )-----------( 288      ( 22 Oct 2018 06:33 )             43.1       10-22    138  |  102  |  14  ----------------------------<  140<H>  3.8   |  21<L>  |  0.83    Ca    9.6      22 Oct 2018 06:33        CARDIAC MARKERS ( 19 Oct 2018 14:32 )  x     / x     / 141 U/L / x     / 1.6 ng/mL        T(C): 36.4 (10-22-18 @ 05:46), Max: 37 (10-21-18 @ 20:05)  HR: 74 (10-22-18 @ 05:46) (74 - 82)  BP: 106/69 (10-22-18 @ 05:46) (106/69 - 139/97)  RR: 18 (10-22-18 @ 05:46) (18 - 19)  SpO2: 95% (10-22-18 @ 05:46) (94% - 96%)  Wt(kg): --    no JVD  RRR, no murmurs  CTAB  soft nt/nd  no c/c/e    cath: TVD  echo: LVEF 48%    A/P) 59 y/o male PMH DM admitted with subjective palpitations in setting of chest pain. Found to have TVD requiring CABG. EP called for palpitations in setting of underlying TVD, but EKG/tele have all been sinus    -for CABG today  -if tele remains unremarkable post-op will recommend outpatient event monitoring  -will cont to monitor tele to r/o ventricular arrythmias given palpitations  -final EP reccs pending post-op course

## 2018-10-22 NOTE — PROGRESS NOTE ADULT - SUBJECTIVE AND OBJECTIVE BOX
LOMBARDO, VINCENT  MRN-[id number]  Patient is a 58y old  Male who presents with a chief complaint of Cardiac Surgery evaluation (22 Oct 2018 12:43)    HPI:  History of Present Illness:    58y Male WITH PMHx of current smoker, DM, Rt inguinal hernia repair and paraumbilical hernia repair who presented to [facility] ER with c/o chest pain x 8 hrs.  Pt notes after waking up 8 hrs, pt tried to get up out of bed and noticed left upper chest pain radiating to left upper back and shoulder described as moderate to severe aching pain. Patient was consulted by neurology for left arm and shoulder pain with associated numbness and tingling and weakness in his left arm. CT Head negative for any acute infarct.  Per neuro: DDX: brachial plexus stretch injury vs rotator cuff injury with nerve impingement. Would recommend PT and OT and complete cardiac workup and continue pain control as needed with NSAIDS and if no improvement in the next 2-3 weeks will perform outpatient EMG at [phone].     Patient was initially stayed in CDU for stress test which showed * Myocardial Perfusion SPECT results are abnormal at 90 % of MPHR. * There are medium sized, moderate defects in inferior and inferoseptal walls that are reversible, suggestive of ischemia. * Post-stress gated wall motion analysis was performed (LVEF = 52 %;LVEDV = 62 ml.), revealing mild hypokinesis of the inferior wall. RV function appeared normal.  RV function appeared normal. *** No previous Nuclear/Stress exam. Patient was admitted for abnormal stress test and underwent cardiac cath which showed: pLAD 70 with +IFR 0.89, OM1 90, m/dRCA 70; RFA access . Sheath removed by 21:30. RFA site appears stable. No signs of bleeding or hematoma. Good pedal pulse.  It was recommended that Heparin gtt to be started 6 hours s/p sheath removal and transferred to  [facility] for CABG eval with Dr. Baldwin. (18 Oct 2018 00:43)      Surgery/Hospital course:    Today:    REVIEW OF SYSTEMS:  Gen:: No fever  EYES/ENT: No visual changes;  No vertigo or throat pain   NECK: No pain or stiffness  RES:  No shortness of breath or Cough  CARD: No chest pain   GI: No abdominal pain  : No dysuria  NEURO: No weakness  SKIN: No itching, rashes, or lesions     Physical Exam:  Vital Signs Last 24 Hrs  T(C): 37.2 (22 Oct 2018 23:00), Max: 37.2 (22 Oct 2018 23:00)  T(F): 99 (22 Oct 2018 23:00), Max: 99 (22 Oct 2018 23:00)  HR: 97 (22 Oct 2018 23:15) (74 - 114)  BP: 110/79 (22 Oct 2018 10:45) (106/69 - 110/79)  BP(mean): --  RR: 13 (22 Oct 2018 23:15) (12 - 18)  SpO2: 100% (22 Oct 2018 23:15) (95% - 100%)  Gen:  Awake, alert,   CNS: non focal .	  Neck: no JVD  RES : clear , no wheezing      ; chest tubes                     CVS: Regular  rhythm. Normal S1/S2  Abd: Soft, non-distended. Bowel sounds present.  Skin: No rash.  Ext:  no edema, A Line  PSY:    ============================I/O===========================   I&O's Detail    21 Oct 2018 07:01  -  22 Oct 2018 07:00  --------------------------------------------------------  IN:    heparin Infusion: 132 mL    Oral Fluid: 1080 mL  Total IN: 1212 mL    OUT:    Voided: 300 mL  Total OUT: 300 mL    Total NET: 912 mL      22 Oct 2018 07:01  -  22 Oct 2018 23:29  --------------------------------------------------------  IN:    Albumin 5%  - 250 mL: 250 mL    DOBUTamine Infusion: 21.5 mL    insulin Infusion: 9 mL    IV PiggyBack: 100 mL    Lactated Ringers IV Bolus: 250 mL    propofol Infusion: 21.6 mL    sodium chloride 0.9%.: 50 mL  Total IN: 702.1 mL    OUT:    Bulb: 85 mL    Chest Tube: 15 mL    Chest Tube: 150 mL    Indwelling Catheter - Urethral: 1575 mL  Total OUT: 1825 mL    Total NET: -1122.9 mL        ============================ LABS =========================                        11.7   9.0   )-----------( 164      ( 22 Oct 2018 19:11 )             33.3     10-22    140  |  105  |  10  ----------------------------<  130<H>  4.4   |  22  |  0.78    Ca    7.9<L>      22 Oct 2018 19:13  Phos  3.3     10-22  Mg     2.8     10-22    TPro  4.8<L>  /  Alb  3.0<L>  /  TBili  1.1  /  DBili  x   /  AST  155<H>  /  ALT  115<H>  /  AlkPhos  46  10-22    LIVER FUNCTIONS - ( 22 Oct 2018 19:13 )  Alb: 3.0 g/dL / Pro: 4.8 g/dL / ALK PHOS: 46 U/L / ALT: 115 U/L / AST: 155 U/L / GGT: x           PT/INR - ( 22 Oct 2018 19:11 )   PT: 13.5 sec;   INR: 1.23 ratio         PTT - ( 22 Oct 2018 19:11 )  PTT:29.3 sec  ABG - ( 22 Oct 2018 22:55 )  pH, Arterial: 7.41  pH, Blood: x     /  pCO2: 42    /  pO2: 111   / HCO3: 26    / Base Excess: 1.8   /  SaO2: 98                      ======================Micro/Rad/Cardio=================  Culture: Reviewed   CXR: Reviewed  Echo:Reviewed  ======================================================  PAST MEDICAL & SURGICAL HISTORY:  CAD (coronary artery disease)  Umbilical hernia  Diabetes mellitus type 2 in nonobese  Paraumbilical hernia      ====================ASSESMENT ==============  CNS:  RES:  CVS:  Hem:  Benson:  GI:  Endo:  ID:  Skin:    Plan:  ====================== NEUROLOGY=====================  dexmedetomidine Infusion 0.7 MICROgram(s)/kG/Hr IV Continuous  meperidine     Injectable 25 milliGRAM(s) IV Push  metoclopramide Injectable 10 milliGRAM(s) IV Push  propofol Infusion 20 MICROgram(s)/kG/Min IV Continuous    ==================== RESPIRATORY======================    Mechanical Ventilation:  Mode: AC/ CMV (Assist Control/ Continuous Mandatory Ventilation)  RR (machine): 12  TV (machine): 600  FiO2: 50  PEEP: 5  ITime: 1  MAP: 9  PIP: 23    ====================CARDIOVASCULAR==================  DOBUTamine Infusion 2 MICROgram(s)/kG/Min IV Continuous <Continuous>  niCARdipine Infusion 5 mG/Hr IV Continuous <Continuous>    ===================HEMATOLOGIC/ONC ===================    ===================== RENAL =========================    ==================== GASTROINTESTINAL===================  docusate sodium 100 milliGRAM(s) Oral three times a day  pantoprazole  Injectable 40 milliGRAM(s) IV Push daily  potassium chloride  10 mEq/50 mL IVPB 10 milliEquivalent(s) IV Intermittent every 1 hour  potassium chloride  10 mEq/50 mL IVPB 10 milliEquivalent(s) IV Intermittent every 1 hour  potassium chloride  10 mEq/50 mL IVPB 10 milliEquivalent(s) IV Intermittent every 1 hour  potassium chloride  10 mEq/50 mL IVPB 10 milliEquivalent(s) IV Intermittent every 1 hour  sodium chloride 0.9%. 1000 milliLiter(s) IV Continuous <Continuous>    =======================    ENDOCRINE  =====================  dextrose 50% Injectable 50  dextrose 50% Injectable 25  insulin Infusion 3  insulin Infusion 1    ========================INFECTIOUS DISEASE================  cefuroxime  IVPB 1500 milliGRAM(s) IV Intermittent every 8 hours      .crit

## 2018-10-22 NOTE — BRIEF OPERATIVE NOTE - POST-OP DX
ASD (atrial septal defect)  10/22/2018    Active  Alma Ornelas  CAD (coronary artery disease)  10/22/2018    Active  Alma Ornelas

## 2018-10-22 NOTE — PROGRESS NOTE ADULT - ASSESSMENT
58 year old male current smoker with family history of CAD with DM-II who presented to [facility] ED 10/17 complaining of chest pain/pressure with associated left arm numbness/weakness.  Denies dizziness, syncope, edema, orthopnea and PND.  CT head was performed showing no CVA and neurology was consulted who thought his symptoms were secondary to neuropathy.  Underwent a Cardiac work up, R/O MI, underwent Nuclear stress test revealing EF 52%, medium sized, moderate defects in inferior and inferoseptal walls that are reversible, suggestive of ischemia.   In light of patients cardiac risk factors, symptoms and abnormal noninvasive test findings there is high suspicion for CAD. Still have left shoulder pain with numbness left hand thumb and index finger.        Problem/Recommendation - 1:  Problem: Chest pain with Multivessel CAD . Recommendation: On Aspirin, BB, Statin and Heparin .Planned for  CABG today .  CTS and Cardiology helping.      Problem/Recommendation - 2:  ·  Problem: left Shoulder and arm pain with Numbness and tingling in left hand.  Recommendation: CT Head noted . Neurology help appreciated and outpt follow up. X Ray noted and tendinosis. PRN Pain meds.     Problem/Recommendation - 3:  ·  Problem: Diabetes mellitus type 2 in nonobese.  Recommendation: Sugars in good range . Holding PO meds . Lantus and SSI .     Problem/Recommendation - 4:  ·  Problem: HLD (hyperlipidemia).  Recommendation: Statin .

## 2018-10-22 NOTE — PROGRESS NOTE ADULT - SUBJECTIVE AND OBJECTIVE BOX
pt seen and examined, no complaints, ROS - .     acetaminophen   Tablet .. 650 milliGRAM(s) Oral every 6 hours PRN  aspirin enteric coated 81 milliGRAM(s) Oral daily  atorvastatin 40 milliGRAM(s) Oral at bedtime  cefuroxime  IVPB 1500 milliGRAM(s) IV Intermittent once  cefuroxime  IVPB 1500 milliGRAM(s) IV Intermittent once  chlorhexidine 0.12% Liquid 5 milliLiter(s) Swish and Spit once  dextrose 40% Gel 15 Gram(s) Oral once PRN  dextrose 5%. 1000 milliLiter(s) IV Continuous <Continuous>  dextrose 50% Injectable 12.5 Gram(s) IV Push once  dextrose 50% Injectable 25 Gram(s) IV Push once  dextrose 50% Injectable 25 Gram(s) IV Push once  glucagon  Injectable 1 milliGRAM(s) IntraMuscular once PRN  heparin  Infusion 1000 Unit(s)/Hr IV Continuous <Continuous>  insulin glargine Injectable (LANTUS) 16 Unit(s) SubCutaneous at bedtime  insulin lispro (HumaLOG) corrective regimen sliding scale   SubCutaneous Before meals and at bedtime  insulin lispro Injectable (HumaLOG) 8 Unit(s) SubCutaneous three times a day before meals  metoprolol tartrate 25 milliGRAM(s) Oral two times a day  oxyCODONE    5 mG/acetaminophen 325 mG 2 Tablet(s) Oral every 6 hours PRN  oxyCODONE    5 mG/acetaminophen 325 mG 1 Tablet(s) Oral every 4 hours PRN  sodium chloride 0.9% lock flush 3 milliLiter(s) IV Push every 8 hours                            14.8   6.6   )-----------( 285      ( 21 Oct 2018 06:56 )             43.2       Hemoglobin: 14.8 g/dL (10-21 @ 06:56)  Hemoglobin: 16.2 g/dL (10-20 @ 05:48)  Hemoglobin: 15.5 g/dL (10-19 @ 06:14)  Hemoglobin: 15.8 g/dL (10-18 @ 16:03)  Hemoglobin: 14.8 g/dL (10-18 @ 02:30)      10-21    135  |  102  |  13  ----------------------------<  145<H>  4.0   |  21<L>  |  0.91    Ca    9.4      21 Oct 2018 06:51      Creatinine Trend: 0.91<--, 0.96<--, 0.91<--, 0.85<--, 1.06<--, 0.91<--    COAGS:     CARDIAC MARKERS ( 19 Oct 2018 14:32 )  x     / x     / 141 U/L / x     / 1.6 ng/mL        T(C): 37 (10-21-18 @ 20:05), Max: 37 (10-21-18 @ 20:05)  HR: 82 (10-21-18 @ 20:05) (70 - 82)  BP: 139/97 (10-21-18 @ 20:05) (115/78 - 139/97)  RR: 18 (10-21-18 @ 20:05) (18 - 19)  SpO2: 94% (10-21-18 @ 20:05) (94% - 96%)  Wt(kg): --    I&O's Summary    20 Oct 2018 07:01  -  21 Oct 2018 07:00  --------------------------------------------------------  IN: 1132 mL / OUT: 1000 mL / NET: 132 mL    21 Oct 2018 07:01  -  22 Oct 2018 05:09  --------------------------------------------------------  IN: 1212 mL / OUT: 300 mL / NET: 912 mL        HEENT:   Normal oral mucosa, PERRL, EOMI	  Lymphatic: No lymphadenopathy , no edema  Cardiovascular: Normal S1 S2, No JVD, No murmurs , Peripheral pulses palpable 2+ bilaterally  Respiratory: Lungs clear to auscultation, normal effort 	  Gastrointestinal:  Soft, Non-tender, + BS	  Skin: No rashes, No ecchymoses, No cyanosis, warm to touch  Musculoskeletal: Normal range of motion, normal strength  Psychiatry:  Mood & affect appropriate    TELEMETRY: SR	      DIAGNOSTIC TESTING:  [ ] Echocardiogram: < from: Transthoracic Echocardiogram (10.18.18 @ 12:46) >  Conclusions:  1. Normal left ventricular internal dimensions and wall  thicknesses.  2. Mild global left ventricular systolic dysfunction with  mild segmental abnormalities.  Mild hypokinesis of the base  to mid anteroseptal, septal walls.  3. Normal diastolic function  4. Normal right ventricular size and function.  *** No previous Echo exam.    < end of copied text >    [ ]  Catheterization:  -Cath with severe stenosis in OM1, LAD (which was functionally significant by IFR of 0.89) and RCA    [ ] Stress Test:  < from: Nuclear Stress Test-Exercise (10.17.18 @ 09:43) >  IMPRESSIONS:Abnormal Study  * Myocardial Perfusion SPECT results are abnormal at 90 %  of MPHR.  * There are medium sized, moderate defects in inferior and  inferoseptal walls that are reversible, suggestive of  ischemia.  * Post-stress gated wall motion analysis was performed  (LVEF = 52 %;LVEDV = 62 ml.), revealing mild hypokinesis  of the inferior wall. RV function appeared normal.  RV  function appeared normal.  *** No previous Nuclear/Stress exam.    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     - npo for OR - CABG today   - ASA, statin    -hep gtt for CAD   - tele stable   -  GI / DVT prophylaxis,  keep K>4, mag >2.0   D/W Dr Hoffman

## 2018-10-22 NOTE — PROGRESS NOTE ADULT - ASSESSMENT
Assessment/Plan  DMT2: 58y Male with DM T2  in OR for cardiac surgery,  continue IV insulin drip and close glucose monitoring    CAD: In  surgery  on medications, stable, monitored.  HTN: Controlled, On med.  HLD: On statin      Case discussed with the NP  Dr Alford [phone]

## 2018-10-22 NOTE — PROGRESS NOTE ADULT - SUBJECTIVE AND OBJECTIVE BOX
Subjective: Patient denies CP, SOB,     Review of systems otherwise (-)    Appearance: Normal	  HEENT:   Normal oral mucosa, PERRL, EOMI	  Lymphatic: No lymphadenopathy , no edema  Cardiovascular: Normal S1 S2, No JVD, No murmurs , Peripheral pulses palpable 2+ bilaterally  Respiratory: Lungs clear to auscultation, normal effort 	  Gastrointestinal:  Soft, Non-tender, + BS	  Skin: No rashes, No ecchymoses, No cyanosis, warm to touch  Musculoskeletal: Normal range of motion, normal strength  Psychiatry:  Mood & affect appropriate    TELEMETRY: SR	      DIAGNOSTIC TESTING:  [ ] Echocardiogram: < from: Transthoracic Echocardiogram (10.18.18 @ 12:46) >  Conclusions:  1. Normal left ventricular internal dimensions and wall  thicknesses.  2. Mild global left ventricular systolic dysfunction with  mild segmental abnormalities.  Mild hypokinesis of the base  to mid anteroseptal, septal walls.  3. Normal diastolic function  4. Normal right ventricular size and function.  *** No previous Echo exam.    < end of copied text >    [ ]  Catheterization:  -Cath with severe stenosis in OM1, LAD (which was functionally significant by IFR of 0.89) and RCA    [ ] Stress Test:  < from: Nuclear Stress Test-Exercise (10.17.18 @ 09:43) >  IMPRESSIONS:Abnormal Study  * Myocardial Perfusion SPECT results are abnormal at 90 %  of MPHR.  * There are medium sized, moderate defects in inferior and  inferoseptal walls that are reversible, suggestive of  ischemia.  * Post-stress gated wall motion analysis was performed  (LVEF = 52 %;LVEDV = 62 ml.), revealing mild hypokinesis  of the inferior wall. RV function appeared normal.  RV  function appeared normal.  *** No previous Nuclear/Stress exam.    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     - Pending CABG today with Dr. Baldwin  - No clinical CHF  - No pertinent findings on tele  - cont Asa Statin BB      Octavia Baird PA-C

## 2018-10-22 NOTE — BRIEF OPERATIVE NOTE - PROCEDURE
<<-----Click on this checkbox to enter Procedure Atrial septal defect closure  10/22/2018    Active  IRINEO  CABG (coronary artery bypass graft)  10/22/2018  CABGx3 (LIMA-LAD, SVG-OM, SVG-Diag)  Active  IRINEO

## 2018-10-22 NOTE — PROGRESS NOTE ADULT - SUBJECTIVE AND OBJECTIVE BOX
Chief complaint  Patient is a 58y old  Male who presents with a chief complaint of Cardiac Surgery evaluation (22 Oct 2018 11:34)   Review of systems  Patient in bed, looks comfortable, no fever,  had no hypoglycemia.    Labs and Fingersticks  CAPILLARY BLOOD GLUCOSE      POCT Blood Glucose.: 138 mg/dL (22 Oct 2018 08:04)  POCT Blood Glucose.: 131 mg/dL (21 Oct 2018 21:06)  POCT Blood Glucose.: 111 mg/dL (21 Oct 2018 16:39)      Anion Gap, Serum: 15 (10-22 @ 06:33)  Anion Gap, Serum: 12 (10-21 @ 06:51)      Calcium, Total Serum: 9.6 (10-22 @ 06:33)  Calcium, Total Serum: 9.4 (10-21 @ 06:51)          10-22    138  |  102  |  14  ----------------------------<  140<H>  3.8   |  21<L>  |  0.83    Ca    9.6      22 Oct 2018 06:33                          14.8   6.4   )-----------( 288      ( 22 Oct 2018 06:33 )             43.1     Medications  MEDICATIONS  (STANDING):      Physical Exam  General: Patient comfortable in bed  Vital Signs Last 12 Hrs  T(F): 97.6 (10-22-18 @ 10:45), Max: 97.6 (10-22-18 @ 10:45)  HR: 78 (10-22-18 @ 10:45) (74 - 78)  BP: 110/79 (10-22-18 @ 10:45) (106/69 - 110/79)  BP(mean): --  RR: 18 (10-22-18 @ 10:45) (18 - 18)  SpO2: 95% (10-22-18 @ 10:45) (95% - 95%)  Neck: No palpable thyroid nodules.  CVS: S1S2, No murmurs  Respiratory: No wheezing, no crepitations  GI: Abdomen soft, bowel sounds positive  Musculoskeletal:  edema lower extremities.   Skin: No skin rashes, no ecchymosis    Diagnostics    Free Thyroxine, Serum: AM Sched. Collection: 20-Oct-2018 06:00 (10-19 @ 06:47)

## 2018-10-22 NOTE — PROGRESS NOTE ADULT - SUBJECTIVE AND OBJECTIVE BOX
INTERVAL HPI/OVERNIGHT EVENTS: Percocet helping left shoulder pain .  Vital Signs Last 24 Hrs  T(C): 36.4 (22 Oct 2018 10:45), Max: 37 (21 Oct 2018 20:05)  T(F): 97.6 (22 Oct 2018 10:45), Max: 98.6 (21 Oct 2018 20:05)  HR: 78 (22 Oct 2018 10:45) (74 - 82)  BP: 110/79 (22 Oct 2018 10:45) (106/69 - 139/97)  BP(mean): --  RR: 18 (22 Oct 2018 10:45) (18 - 19)  SpO2: 95% (22 Oct 2018 10:45) (94% - 96%)  I&O's Summary    21 Oct 2018 07:01  -  22 Oct 2018 07:00  --------------------------------------------------------  IN: 1212 mL / OUT: 300 mL / NET: 912 mL      MEDICATIONS  (STANDING):    MEDICATIONS  (PRN):    LABS:                        14.8   6.4   )-----------( 288      ( 22 Oct 2018 06:33 )             43.1     10-22    138  |  102  |  14  ----------------------------<  140<H>  3.8   |  21<L>  |  0.83    Ca    9.6      22 Oct 2018 06:33      PTT - ( 22 Oct 2018 06:33 )  PTT:76.5 sec    CAPILLARY BLOOD GLUCOSE      POCT Blood Glucose.: 138 mg/dL (22 Oct 2018 08:04)  POCT Blood Glucose.: 131 mg/dL (21 Oct 2018 21:06)  POCT Blood Glucose.: 111 mg/dL (21 Oct 2018 16:39)  POCT Blood Glucose.: 156 mg/dL (21 Oct 2018 11:58)          REVIEW OF SYSTEMS:  CONSTITUTIONAL: No fever, weight loss, or fatigue  EYES: No eye pain, visual disturbances, or discharge  ENMT:  No difficulty hearing, tinnitus, vertigo; No sinus or throat pain  NECK: No pain or stiffness  RESPIRATORY: No cough, wheezing, chills or hemoptysis; No shortness of breath  CARDIOVASCULAR: No chest pain, palpitations, dizziness, or leg swelling  GASTROINTESTINAL: No abdominal or epigastric pain. No nausea, vomiting, or hematemesis; No diarrhea or constipation. No melena or hematochezia.  GENITOURINARY: No dysuria, frequency, hematuria, or incontinence  NEUROLOGICAL: No headaches, memory loss, loss of strength, numbness, or tremors  SKIN: No itching, burning, rashes, or lesions   LYMPH NODES: No enlarged glands  ENDOCRINE: No heat or cold intolerance; No hair loss  MUSCULOSKELETAL: left shoulder pain     Consultant(s) Notes Reviewed:  [x ] YES  [ ] NO    PHYSICAL EXAM:  GENERAL: NAD, well-groomed, well-developed, not in any distress ,  HEAD:  Atraumatic, Normocephalic  EYES: EOMI, PERRLA, conjunctiva and sclera clear  ENMT: No tonsillar erythema, exudates, or enlargement; Moist mucous membranes, Good dentition, No lesions  NECK: Supple, No JVD, Normal thyroid  NERVOUS SYSTEM:  Alert & Oriented X3, No focal deficit   CHEST/LUNG: Good air entry bilateral with no  rales, rhonchi, wheezing, or rubs  HEART: Regular rate and rhythm; No murmurs, rubs, or gallops  ABDOMEN: Soft, Nontender, Nondistended; Bowel sounds present  EXTREMITIES:  2+ Peripheral Pulses, No clubbing, cyanosis, or edema  SKIN: No rashes or lesions    Care Discussed with Consultants/Other Providers [ x] YES  [ ] NO

## 2018-10-23 PROBLEM — I25.10 ATHEROSCLEROTIC HEART DISEASE OF NATIVE CORONARY ARTERY WITHOUT ANGINA PECTORIS: Chronic | Status: ACTIVE | Noted: 2018-10-18

## 2018-10-23 PROBLEM — Z00.00 ENCOUNTER FOR PREVENTIVE HEALTH EXAMINATION: Status: ACTIVE | Noted: 2018-10-23

## 2018-10-23 PROBLEM — K42.9 UMBILICAL HERNIA WITHOUT OBSTRUCTION OR GANGRENE: Chronic | Status: ACTIVE | Noted: 2018-10-17

## 2018-10-23 LAB
ALBUMIN SERPL ELPH-MCNC: 3.7 G/DL — SIGNIFICANT CHANGE UP (ref 3.3–5)
ALP SERPL-CCNC: 49 U/L — SIGNIFICANT CHANGE UP (ref 40–120)
ALT FLD-CCNC: 123 U/L — HIGH (ref 10–45)
ANION GAP SERPL CALC-SCNC: 10 MMOL/L — SIGNIFICANT CHANGE UP (ref 5–17)
APTT BLD: 27.9 SEC — SIGNIFICANT CHANGE UP (ref 27.5–37.4)
AST SERPL-CCNC: 168 U/L — HIGH (ref 10–40)
BASE EXCESS BLDV CALC-SCNC: 0.6 MMOL/L — SIGNIFICANT CHANGE UP (ref -2–2)
BASOPHILS # BLD AUTO: 0 K/UL — SIGNIFICANT CHANGE UP (ref 0–0.2)
BASOPHILS NFR BLD AUTO: 0.1 % — SIGNIFICANT CHANGE UP (ref 0–2)
BILIRUB SERPL-MCNC: 0.8 MG/DL — SIGNIFICANT CHANGE UP (ref 0.2–1.2)
BUN SERPL-MCNC: 9 MG/DL — SIGNIFICANT CHANGE UP (ref 7–23)
CALCIUM SERPL-MCNC: 7.9 MG/DL — LOW (ref 8.4–10.5)
CHLORIDE SERPL-SCNC: 107 MMOL/L — SIGNIFICANT CHANGE UP (ref 96–108)
CO2 BLDV-SCNC: 28 MMOL/L — SIGNIFICANT CHANGE UP (ref 22–30)
CO2 SERPL-SCNC: 22 MMOL/L — SIGNIFICANT CHANGE UP (ref 22–31)
CREAT SERPL-MCNC: 0.88 MG/DL — SIGNIFICANT CHANGE UP (ref 0.5–1.3)
EOSINOPHIL # BLD AUTO: 0.1 K/UL — SIGNIFICANT CHANGE UP (ref 0–0.5)
EOSINOPHIL NFR BLD AUTO: 0.7 % — SIGNIFICANT CHANGE UP (ref 0–6)
GAS PNL BLDA: SIGNIFICANT CHANGE UP
GAS PNL BLDV: SIGNIFICANT CHANGE UP
GLUCOSE SERPL-MCNC: 123 MG/DL — HIGH (ref 70–99)
HCO3 BLDV-SCNC: 26 MMOL/L — SIGNIFICANT CHANGE UP (ref 21–29)
HCT VFR BLD CALC: 35 % — LOW (ref 39–50)
HGB BLD-MCNC: 12.3 G/DL — LOW (ref 13–17)
HOROWITZ INDEX BLDV+IHG-RTO: 50 — SIGNIFICANT CHANGE UP
INR BLD: 1.15 RATIO — SIGNIFICANT CHANGE UP (ref 0.88–1.16)
LYMPHOCYTES # BLD AUTO: 1.3 K/UL — SIGNIFICANT CHANGE UP (ref 1–3.3)
LYMPHOCYTES # BLD AUTO: 14.1 % — SIGNIFICANT CHANGE UP (ref 13–44)
MCHC RBC-ENTMCNC: 32 PG — SIGNIFICANT CHANGE UP (ref 27–34)
MCHC RBC-ENTMCNC: 35.3 GM/DL — SIGNIFICANT CHANGE UP (ref 32–36)
MCV RBC AUTO: 90.6 FL — SIGNIFICANT CHANGE UP (ref 80–100)
MONOCYTES # BLD AUTO: 1.1 K/UL — HIGH (ref 0–0.9)
MONOCYTES NFR BLD AUTO: 11.8 % — SIGNIFICANT CHANGE UP (ref 2–14)
NEUTROPHILS # BLD AUTO: 6.6 K/UL — SIGNIFICANT CHANGE UP (ref 1.8–7.4)
NEUTROPHILS NFR BLD AUTO: 73.3 % — SIGNIFICANT CHANGE UP (ref 43–77)
PCO2 BLDV: 50 MMHG — SIGNIFICANT CHANGE UP (ref 35–50)
PH BLDV: 7.34 — LOW (ref 7.35–7.45)
PLATELET # BLD AUTO: 219 K/UL — SIGNIFICANT CHANGE UP (ref 150–400)
PO2 BLDV: 39 MMHG — SIGNIFICANT CHANGE UP (ref 25–45)
POTASSIUM SERPL-MCNC: 5.2 MMOL/L — SIGNIFICANT CHANGE UP (ref 3.5–5.3)
POTASSIUM SERPL-SCNC: 5.2 MMOL/L — SIGNIFICANT CHANGE UP (ref 3.5–5.3)
PROT SERPL-MCNC: 5.4 G/DL — LOW (ref 6–8.3)
PROTHROM AB SERPL-ACNC: 12.6 SEC — SIGNIFICANT CHANGE UP (ref 9.8–12.7)
RBC # BLD: 3.86 M/UL — LOW (ref 4.2–5.8)
RBC # FLD: 12.1 % — SIGNIFICANT CHANGE UP (ref 10.3–14.5)
SAO2 % BLDV: 67 % — SIGNIFICANT CHANGE UP (ref 67–88)
SODIUM SERPL-SCNC: 139 MMOL/L — SIGNIFICANT CHANGE UP (ref 135–145)
WBC # BLD: 9 K/UL — SIGNIFICANT CHANGE UP (ref 3.8–10.5)
WBC # FLD AUTO: 9 K/UL — SIGNIFICANT CHANGE UP (ref 3.8–10.5)

## 2018-10-23 PROCEDURE — 93010 ELECTROCARDIOGRAM REPORT: CPT

## 2018-10-23 PROCEDURE — 71045 X-RAY EXAM CHEST 1 VIEW: CPT | Mod: 26

## 2018-10-23 RX ORDER — HYDROMORPHONE HYDROCHLORIDE 2 MG/ML
0.5 INJECTION INTRAMUSCULAR; INTRAVENOUS; SUBCUTANEOUS ONCE
Qty: 0 | Refills: 0 | Status: DISCONTINUED | OUTPATIENT
Start: 2018-10-23 | End: 2018-10-23

## 2018-10-23 RX ORDER — OXYCODONE AND ACETAMINOPHEN 5; 325 MG/1; MG/1
1 TABLET ORAL EVERY 6 HOURS
Qty: 0 | Refills: 0 | Status: DISCONTINUED | OUTPATIENT
Start: 2018-10-23 | End: 2018-10-24

## 2018-10-23 RX ORDER — ALBUMIN HUMAN 25 %
250 VIAL (ML) INTRAVENOUS ONCE
Qty: 0 | Refills: 0 | Status: COMPLETED | OUTPATIENT
Start: 2018-10-23 | End: 2018-10-23

## 2018-10-23 RX ORDER — CLOPIDOGREL BISULFATE 75 MG/1
75 TABLET, FILM COATED ORAL DAILY
Qty: 0 | Refills: 0 | Status: DISCONTINUED | OUTPATIENT
Start: 2018-10-23 | End: 2018-10-27

## 2018-10-23 RX ORDER — ASPIRIN/CALCIUM CARB/MAGNESIUM 324 MG
325 TABLET ORAL DAILY
Qty: 0 | Refills: 0 | Status: DISCONTINUED | OUTPATIENT
Start: 2018-10-23 | End: 2018-10-27

## 2018-10-23 RX ORDER — ATORVASTATIN CALCIUM 80 MG/1
80 TABLET, FILM COATED ORAL AT BEDTIME
Qty: 0 | Refills: 0 | Status: DISCONTINUED | OUTPATIENT
Start: 2018-10-23 | End: 2018-10-27

## 2018-10-23 RX ORDER — ACETAMINOPHEN 500 MG
1000 TABLET ORAL ONCE
Qty: 0 | Refills: 0 | Status: COMPLETED | OUTPATIENT
Start: 2018-10-23 | End: 2018-10-23

## 2018-10-23 RX ORDER — POTASSIUM CHLORIDE 20 MEQ
40 PACKET (EA) ORAL ONCE
Qty: 0 | Refills: 0 | Status: COMPLETED | OUTPATIENT
Start: 2018-10-23 | End: 2018-10-23

## 2018-10-23 RX ORDER — POTASSIUM CHLORIDE 20 MEQ
10 PACKET (EA) ORAL
Qty: 0 | Refills: 0 | Status: COMPLETED | OUTPATIENT
Start: 2018-10-23 | End: 2018-10-23

## 2018-10-23 RX ORDER — VASOPRESSIN 20 [USP'U]/ML
0.1 INJECTION INTRAVENOUS
Qty: 100 | Refills: 0 | Status: DISCONTINUED | OUTPATIENT
Start: 2018-10-23 | End: 2018-10-23

## 2018-10-23 RX ORDER — NOREPINEPHRINE BITARTRATE/D5W 8 MG/250ML
0.05 PLASTIC BAG, INJECTION (ML) INTRAVENOUS
Qty: 8 | Refills: 0 | Status: DISCONTINUED | OUTPATIENT
Start: 2018-10-23 | End: 2018-10-23

## 2018-10-23 RX ORDER — POTASSIUM CHLORIDE 20 MEQ
10 PACKET (EA) ORAL ONCE
Qty: 0 | Refills: 0 | Status: COMPLETED | OUTPATIENT
Start: 2018-10-23 | End: 2018-10-23

## 2018-10-23 RX ORDER — PANTOPRAZOLE SODIUM 20 MG/1
40 TABLET, DELAYED RELEASE ORAL
Qty: 0 | Refills: 0 | Status: DISCONTINUED | OUTPATIENT
Start: 2018-10-23 | End: 2018-10-27

## 2018-10-23 RX ORDER — OXYCODONE AND ACETAMINOPHEN 5; 325 MG/1; MG/1
2 TABLET ORAL EVERY 6 HOURS
Qty: 0 | Refills: 0 | Status: DISCONTINUED | OUTPATIENT
Start: 2018-10-23 | End: 2018-10-24

## 2018-10-23 RX ORDER — ENOXAPARIN SODIUM 100 MG/ML
40 INJECTION SUBCUTANEOUS DAILY
Qty: 0 | Refills: 0 | Status: DISCONTINUED | OUTPATIENT
Start: 2018-10-23 | End: 2018-10-27

## 2018-10-23 RX ADMIN — HYDROMORPHONE HYDROCHLORIDE 0.5 MILLIGRAM(S): 2 INJECTION INTRAMUSCULAR; INTRAVENOUS; SUBCUTANEOUS at 12:15

## 2018-10-23 RX ADMIN — Medication 100 MILLIEQUIVALENT(S): at 06:00

## 2018-10-23 RX ADMIN — Medication 50 MILLIEQUIVALENT(S): at 20:15

## 2018-10-23 RX ADMIN — Medication 10 MILLIGRAM(S): at 21:22

## 2018-10-23 RX ADMIN — Medication 125 MILLILITER(S): at 16:27

## 2018-10-23 RX ADMIN — CLOPIDOGREL BISULFATE 75 MILLIGRAM(S): 75 TABLET, FILM COATED ORAL at 12:12

## 2018-10-23 RX ADMIN — PANTOPRAZOLE SODIUM 40 MILLIGRAM(S): 20 TABLET, DELAYED RELEASE ORAL at 12:35

## 2018-10-23 RX ADMIN — Medication 50 MILLIEQUIVALENT(S): at 23:12

## 2018-10-23 RX ADMIN — Medication 100 MILLIGRAM(S): at 19:48

## 2018-10-23 RX ADMIN — Medication 500 MILLILITER(S): at 01:54

## 2018-10-23 RX ADMIN — HYDROMORPHONE HYDROCHLORIDE 0.5 MILLIGRAM(S): 2 INJECTION INTRAMUSCULAR; INTRAVENOUS; SUBCUTANEOUS at 01:52

## 2018-10-23 RX ADMIN — OXYCODONE AND ACETAMINOPHEN 2 TABLET(S): 5; 325 TABLET ORAL at 22:05

## 2018-10-23 RX ADMIN — Medication 500 MILLILITER(S): at 03:53

## 2018-10-23 RX ADMIN — HYDROMORPHONE HYDROCHLORIDE 0.5 MILLIGRAM(S): 2 INJECTION INTRAMUSCULAR; INTRAVENOUS; SUBCUTANEOUS at 00:19

## 2018-10-23 RX ADMIN — Medication 125 MILLILITER(S): at 10:25

## 2018-10-23 RX ADMIN — Medication 100 MILLIEQUIVALENT(S): at 08:24

## 2018-10-23 RX ADMIN — OXYCODONE AND ACETAMINOPHEN 2 TABLET(S): 5; 325 TABLET ORAL at 14:42

## 2018-10-23 RX ADMIN — Medication 400 MILLIGRAM(S): at 16:30

## 2018-10-23 RX ADMIN — HYDROMORPHONE HYDROCHLORIDE 0.5 MILLIGRAM(S): 2 INJECTION INTRAMUSCULAR; INTRAVENOUS; SUBCUTANEOUS at 19:45

## 2018-10-23 RX ADMIN — Medication 10 MILLIGRAM(S): at 14:00

## 2018-10-23 RX ADMIN — Medication 50 MILLIEQUIVALENT(S): at 21:08

## 2018-10-23 RX ADMIN — ATORVASTATIN CALCIUM 80 MILLIGRAM(S): 80 TABLET, FILM COATED ORAL at 21:22

## 2018-10-23 RX ADMIN — CHLORHEXIDINE GLUCONATE 5 MILLILITER(S): 213 SOLUTION TOPICAL at 05:42

## 2018-10-23 RX ADMIN — NICARDIPINE HYDROCHLORIDE 25 MG/HR: 30 CAPSULE, EXTENDED RELEASE ORAL at 04:19

## 2018-10-23 RX ADMIN — ENOXAPARIN SODIUM 40 MILLIGRAM(S): 100 INJECTION SUBCUTANEOUS at 12:24

## 2018-10-23 RX ADMIN — HYDROMORPHONE HYDROCHLORIDE 0.5 MILLIGRAM(S): 2 INJECTION INTRAMUSCULAR; INTRAVENOUS; SUBCUTANEOUS at 08:50

## 2018-10-23 RX ADMIN — OXYCODONE AND ACETAMINOPHEN 2 TABLET(S): 5; 325 TABLET ORAL at 21:35

## 2018-10-23 RX ADMIN — PROPOFOL 8.64 MICROGRAM(S)/KG/MIN: 10 INJECTION, EMULSION INTRAVENOUS at 04:20

## 2018-10-23 RX ADMIN — CHLORHEXIDINE GLUCONATE 5 MILLILITER(S): 213 SOLUTION TOPICAL at 01:04

## 2018-10-23 RX ADMIN — Medication 100 MILLIEQUIVALENT(S): at 06:44

## 2018-10-23 RX ADMIN — VASOPRESSIN 6 UNIT(S)/MIN: 20 INJECTION INTRAVENOUS at 04:17

## 2018-10-23 RX ADMIN — OXYCODONE AND ACETAMINOPHEN 2 TABLET(S): 5; 325 TABLET ORAL at 15:51

## 2018-10-23 RX ADMIN — HYDROMORPHONE HYDROCHLORIDE 0.5 MILLIGRAM(S): 2 INJECTION INTRAMUSCULAR; INTRAVENOUS; SUBCUTANEOUS at 20:00

## 2018-10-23 RX ADMIN — HYDROMORPHONE HYDROCHLORIDE 0.5 MILLIGRAM(S): 2 INJECTION INTRAMUSCULAR; INTRAVENOUS; SUBCUTANEOUS at 08:38

## 2018-10-23 RX ADMIN — Medication 10 MILLIGRAM(S): at 05:42

## 2018-10-23 RX ADMIN — INSULIN HUMAN 3 UNIT(S)/HR: 100 INJECTION, SOLUTION SUBCUTANEOUS at 04:18

## 2018-10-23 RX ADMIN — Medication 100 MILLIGRAM(S): at 12:24

## 2018-10-23 RX ADMIN — HYDROMORPHONE HYDROCHLORIDE 0.5 MILLIGRAM(S): 2 INJECTION INTRAMUSCULAR; INTRAVENOUS; SUBCUTANEOUS at 12:00

## 2018-10-23 RX ADMIN — Medication 40 MILLIEQUIVALENT(S): at 14:52

## 2018-10-23 RX ADMIN — Medication 1000 MILLIGRAM(S): at 01:52

## 2018-10-23 RX ADMIN — Medication 1000 MILLIGRAM(S): at 17:03

## 2018-10-23 RX ADMIN — Medication 6.75 MICROGRAM(S)/KG/MIN: at 04:18

## 2018-10-23 RX ADMIN — DEXMEDETOMIDINE HYDROCHLORIDE IN 0.9% SODIUM CHLORIDE 12.6 MICROGRAM(S)/KG/HR: 4 INJECTION INTRAVENOUS at 04:18

## 2018-10-23 RX ADMIN — Medication 500 MILLILITER(S): at 02:20

## 2018-10-23 RX ADMIN — Medication 4.32 MICROGRAM(S)/KG/MIN: at 04:19

## 2018-10-23 RX ADMIN — Medication 100 MILLIGRAM(S): at 21:22

## 2018-10-23 RX ADMIN — Medication 400 MILLIGRAM(S): at 01:37

## 2018-10-23 RX ADMIN — Medication 125 MILLILITER(S): at 09:50

## 2018-10-23 RX ADMIN — ATORVASTATIN CALCIUM 80 MILLIGRAM(S): 80 TABLET, FILM COATED ORAL at 01:04

## 2018-10-23 RX ADMIN — Medication 125 MILLILITER(S): at 08:23

## 2018-10-23 RX ADMIN — Medication 100 MILLIGRAM(S): at 04:17

## 2018-10-23 RX ADMIN — Medication 325 MILLIGRAM(S): at 12:14

## 2018-10-23 RX ADMIN — HYDROMORPHONE HYDROCHLORIDE 0.5 MILLIGRAM(S): 2 INJECTION INTRAMUSCULAR; INTRAVENOUS; SUBCUTANEOUS at 01:38

## 2018-10-23 RX ADMIN — HYDROMORPHONE HYDROCHLORIDE 0.5 MILLIGRAM(S): 2 INJECTION INTRAMUSCULAR; INTRAVENOUS; SUBCUTANEOUS at 00:04

## 2018-10-23 NOTE — PROGRESS NOTE ADULT - SUBJECTIVE AND OBJECTIVE BOX
EP ATTENDING    tele: NSR, no events    no palpitations, no syncope, no angina    aspirin enteric coated 325 milliGRAM(s) Oral daily  atorvastatin 80 milliGRAM(s) Oral at bedtime  cefuroxime  IVPB 1500 milliGRAM(s) IV Intermittent every 8 hours  chlorhexidine 0.12% Liquid 5 milliLiter(s) Oral Mucosa every 4 hours  clopidogrel Tablet 75 milliGRAM(s) Oral daily  dexmedetomidine Infusion 0.7 MICROgram(s)/kG/Hr IV Continuous <Continuous>  dextrose 50% Injectable 50 milliLiter(s) IV Push every 15 minutes  dextrose 50% Injectable 25 milliLiter(s) IV Push every 15 minutes  docusate sodium 100 milliGRAM(s) Oral three times a day  enoxaparin Injectable 40 milliGRAM(s) SubCutaneous daily  insulin Infusion 3 Unit(s)/Hr IV Continuous <Continuous>  insulin Infusion 1 Unit(s)/Hr IV Continuous <Continuous>  meperidine     Injectable 25 milliGRAM(s) IV Push once  metoclopramide Injectable 10 milliGRAM(s) IV Push every 8 hours  niCARdipine Infusion 5 mG/Hr IV Continuous <Continuous>  norepinephrine Infusion 0.05 MICROgram(s)/kG/Min IV Continuous <Continuous>  pantoprazole  Injectable 40 milliGRAM(s) IV Push daily  potassium chloride  10 mEq/50 mL IVPB 10 milliEquivalent(s) IV Intermittent every 1 hour  potassium chloride  10 mEq/50 mL IVPB 10 milliEquivalent(s) IV Intermittent every 1 hour  potassium chloride  10 mEq/50 mL IVPB 10 milliEquivalent(s) IV Intermittent every 1 hour  propofol Infusion 20 MICROgram(s)/kG/Min IV Continuous <Continuous>  sodium chloride 0.9%. 1000 milliLiter(s) IV Continuous <Continuous>  vasopressin Infusion 0.1 Unit(s)/Min IV Continuous <Continuous>                            12.3   9.0   )-----------( 219      ( 23 Oct 2018 02:05 )             35.0       10-23    139  |  107  |  9   ----------------------------<  123<H>  5.2   |  22  |  0.88    Ca    7.9<L>      23 Oct 2018 02:05  Phos  3.3     10-22  Mg     2.8     10-22    TPro  5.4<L>  /  Alb  3.7  /  TBili  0.8  /  DBili  x   /  AST  168<H>  /  ALT  123<H>  /  AlkPhos  49  10-23      CARDIAC MARKERS ( 22 Oct 2018 19:13 )  x     / x     / 234 U/L / x     / 16.8 ng/mL        T(C): 37.5 (10-23-18 @ 08:00), Max: 37.8 (10-23-18 @ 07:00)  HR: 92 (10-23-18 @ 11:30) (89 - 114)  BP: --  RR: 22 (10-23-18 @ 11:30) (12 - 28)  SpO2: 94% (10-23-18 @ 11:30) (91% - 100%)  Wt(kg): --    no JVD  RRR, no murmurs  CTAB  soft nt/nd  no c/c/e    cath: TVD  echo: LVEF 48%    A/P) 57 y/o male PMH DM admitted with subjective palpitations in setting of chest pain. Found to have TVD requiring CABG. EP called for palpitations in setting of underlying TVD, but EKG/tele have all been sinus. Now s/p CABG and ASD closure doing well    -if tele remains unremarkable post-op will recommend outpatient event monitoring  -will cont to monitor tele to r/o ventricular arrythmias given palpitations  -final EP reccs pending post-op course

## 2018-10-23 NOTE — PHYSICAL THERAPY INITIAL EVALUATION ADULT - ADDITIONAL COMMENTS
Pt lives in a  with his 3 children +3 steps to enter without HR, 1 flight of stairs up to second floor. Pt reports he is able to stay on first floor with bedroom/bathroom if necessary. Pt states he was ambulating independently without use of an AD prior and was I with all ADLs

## 2018-10-23 NOTE — PROGRESS NOTE ADULT - ASSESSMENT
Assessment/Plan  DMT2: 58y Male with DM T2  day 1 post  surgery,  continue IV insulin drip 3.5 units/hr and close glucose monitoring    CAD: day 1 post-op on medications, stable, monitored.  HTN: Controlled, On med.  HLD: On statin      Case discussed with the NP  Dr Alford [phone]

## 2018-10-23 NOTE — PHYSICAL THERAPY INITIAL EVALUATION ADULT - ACTIVE RANGE OF MOTION EXAMINATION, REHAB EVAL
Left LE Active ROM was WFL (within functional limits)/Left UE Active ROM was WFL (within functional limits)/Right UE Active ROM was WFL (within functional limits)/Right LE Active ROM was WFL (within functional limits)

## 2018-10-23 NOTE — PROGRESS NOTE ADULT - SUBJECTIVE AND OBJECTIVE BOX
Subjective: Patient denies CP, SOB,     cpap trial in progress   on low dose / pressors for support  no bleeding noted     atorvastatin 80 milliGRAM(s) Oral at bedtime  cefuroxime  IVPB 1500 milliGRAM(s) IV Intermittent every 8 hours  chlorhexidine 0.12% Liquid 5 milliLiter(s) Oral Mucosa every 4 hours  dexmedetomidine Infusion 0.7 MICROgram(s)/kG/Hr IV Continuous <Continuous>  dextrose 50% Injectable 50 milliLiter(s) IV Push every 15 minutes  dextrose 50% Injectable 25 milliLiter(s) IV Push every 15 minutes  DOBUTamine Infusion 2 MICROgram(s)/kG/Min IV Continuous <Continuous>  docusate sodium 100 milliGRAM(s) Oral three times a day  insulin Infusion 3 Unit(s)/Hr IV Continuous <Continuous>  insulin Infusion 1 Unit(s)/Hr IV Continuous <Continuous>  meperidine     Injectable 25 milliGRAM(s) IV Push once  metoclopramide Injectable 10 milliGRAM(s) IV Push every 8 hours  niCARdipine Infusion 5 mG/Hr IV Continuous <Continuous>  norepinephrine Infusion 0.05 MICROgram(s)/kG/Min IV Continuous <Continuous>  pantoprazole  Injectable 40 milliGRAM(s) IV Push daily  potassium chloride  10 mEq/50 mL IVPB 10 milliEquivalent(s) IV Intermittent every 1 hour  potassium chloride  10 mEq/50 mL IVPB 10 milliEquivalent(s) IV Intermittent every 1 hour  potassium chloride  10 mEq/50 mL IVPB 10 milliEquivalent(s) IV Intermittent every 1 hour  propofol Infusion 20 MICROgram(s)/kG/Min IV Continuous <Continuous>  sodium chloride 0.9%. 1000 milliLiter(s) IV Continuous <Continuous>  vasopressin Infusion 0.1 Unit(s)/Min IV Continuous <Continuous>                            12.3   9.0   )-----------( 219      ( 23 Oct 2018 02:05 )             35.0       Hemoglobin: 12.3 g/dL (10-23 @ 02:05)  Hemoglobin: 11.7 g/dL (10-22 @ 19:11)  Hemoglobin: 14.8 g/dL (10-22 @ 06:33)  Hemoglobin: 14.8 g/dL (10-21 @ 06:56)  Hemoglobin: 16.2 g/dL (10-20 @ 05:48)      10-23    139  |  107  |  9   ----------------------------<  123<H>  5.2   |  22  |  0.88    Ca    7.9<L>      23 Oct 2018 02:05  Phos  3.3     10-22  Mg     2.8     10-22    TPro  5.4<L>  /  Alb  3.7  /  TBili  0.8  /  DBili  x   /  AST  168<H>  /  ALT  123<H>  /  AlkPhos  49  10-23    Creatinine Trend: 0.88<--, 0.78<--, 0.83<--, 0.91<--, 0.96<--, 0.91<--    COAGS: PT/INR - ( 23 Oct 2018 02:05 )   PT: 12.6 sec;   INR: 1.15 ratio         PTT - ( 23 Oct 2018 02:05 )  PTT:27.9 sec    CARDIAC MARKERS ( 22 Oct 2018 19:13 )  x     / x     / 234 U/L / x     / 16.8 ng/mL        T(C): 37.5 (10-23-18 @ 03:00), Max: 37.5 (10-23-18 @ 03:00)  HR: 98 (10-23-18 @ 05:00) (74 - 114)  BP: 110/79 (10-22-18 @ 10:45) (106/69 - 110/79)  RR: 15 (10-23-18 @ 05:00) (12 - 18)  SpO2: 98% (10-23-18 @ 05:00) (95% - 100%)  Wt(kg): --    I&O's Summary    21 Oct 2018 07:  -  22 Oct 2018 07:00  --------------------------------------------------------  IN: 1212 mL / OUT: 300 mL / NET: 912 mL    22 Oct 2018 07:  -  23 Oct 2018 05:13  --------------------------------------------------------  IN: 1639.7 mL / OUT: 2760 mL / NET: -1120.3 mL      Appearance: Normal	  HEENT:   Normal oral mucosa, PERRL, EOMI	  Lymphatic: No lymphadenopathy , no edema  Cardiovascular: Normal S1 S2, No JVD, No murmurs , Peripheral pulses palpable 2+ bilaterally  Respiratory: Lungs clear to auscultation, normal effort 	  Gastrointestinal:  Soft, Non-tender, + BS	      TELEMETRY: SR	      DIAGNOSTIC TESTING:  [ ] Echocardiogram: < from: Transthoracic Echocardiogram (10.18.18 @ 12:46) >  Conclusions:  1. Normal left ventricular internal dimensions and wall  thicknesses.  2. Mild global left ventricular systolic dysfunction with  mild segmental abnormalities.  Mild hypokinesis of the base  to mid anteroseptal, septal walls.  3. Normal diastolic function  4. Normal right ventricular size and function.  *** No previous Echo exam.    < end of copied text >    [ ]  Catheterization:  -Cath with severe stenosis in OM1, LAD (which was functionally significant by IFR of 0.89) and RCA    [ ] Stress Test:  < from: Nuclear Stress Test-Exercise (10.17.18 @ 09:43) >  IMPRESSIONS:Abnormal Study  * Myocardial Perfusion SPECT results are abnormal at 90 %  of MPHR.  * There are medium sized, moderate defects in inferior and  inferoseptal walls that are reversible, suggestive of  ischemia.  * Post-stress gated wall motion analysis was performed  (LVEF = 52 %;LVEDV = 62 ml.), revealing mild hypokinesis  of the inferior wall. RV function appeared normal.  RV  function appeared normal.  *** No previous Nuclear/Stress exam.    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  s/p C3L , ASD closure  POD 1    wean vent per ctu   GI / DVT prophylaxis. keep K>4, mag >2.0  wean pressors   - asa, statin   - glycemic control   -cont post op ABX   -d/c chest tube  - pain management.  D/W Dr Gibson

## 2018-10-23 NOTE — PHYSICAL THERAPY INITIAL EVALUATION ADULT - PERTINENT HX OF CURRENT PROBLEM, REHAB EVAL
58y M current smoker, DM, presented to [facility] ER with c/o CP. Pt noticed left upper chest pain radiating to left upper back and shoulder. Neurology c/s for left arm and shoulder pain with associated numbness and tingling. CTH negative for any acute infarct. Per neuro: DDX: brachial plexus stretch injury vs rotator cuff injury with nerve impingement, reccomending PT/OT and cardiac workup. Stress test showed Myocardial Perfusion SPECT results are abnormal at 90 % of MPHR.

## 2018-10-23 NOTE — PHYSICAL THERAPY INITIAL EVALUATION ADULT - MANUAL MUSCLE TESTING RESULTS, REHAB EVAL
grossly assessed due to/B/L LEs and RUE grossly at least 3+/5, LUE limited 2/2 pain +L  strength limited

## 2018-10-23 NOTE — PROGRESS NOTE ADULT - SUBJECTIVE AND OBJECTIVE BOX
INTERVAL HPI/OVERNIGHT EVENTS: sitting in Chair . I feel fine.   Vital Signs Last 24 Hrs  T(C): 37.5 (23 Oct 2018 08:00), Max: 37.8 (23 Oct 2018 07:00)  T(F): 99.5 (23 Oct 2018 08:00), Max: 100 (23 Oct 2018 07:00)  HR: 94 (23 Oct 2018 14:00) (89 - 114)  BP: --  BP(mean): --  RR: 23 (23 Oct 2018 14:00) (12 - 28)  SpO2: 92% (23 Oct 2018 14:00) (91% - 100%)  I&O's Summary    22 Oct 2018 07:01  -  23 Oct 2018 07:00  --------------------------------------------------------  IN: 1799.7 mL / OUT: 3110 mL / NET: -1310.3 mL    23 Oct 2018 07:01  -  23 Oct 2018 15:11  --------------------------------------------------------  IN: 844.9 mL / OUT: 480 mL / NET: 364.9 mL      MEDICATIONS  (STANDING):  aspirin enteric coated 325 milliGRAM(s) Oral daily  atorvastatin 80 milliGRAM(s) Oral at bedtime  cefuroxime  IVPB 1500 milliGRAM(s) IV Intermittent every 8 hours  chlorhexidine 0.12% Liquid 5 milliLiter(s) Oral Mucosa every 4 hours  clopidogrel Tablet 75 milliGRAM(s) Oral daily  dexmedetomidine Infusion 0.7 MICROgram(s)/kG/Hr (12.6 mL/Hr) IV Continuous <Continuous>  dextrose 50% Injectable 50 milliLiter(s) IV Push every 15 minutes  dextrose 50% Injectable 25 milliLiter(s) IV Push every 15 minutes  docusate sodium 100 milliGRAM(s) Oral three times a day  enoxaparin Injectable 40 milliGRAM(s) SubCutaneous daily  insulin Infusion 1 Unit(s)/Hr (1 mL/Hr) IV Continuous <Continuous>  meperidine     Injectable 25 milliGRAM(s) IV Push once  metoclopramide Injectable 10 milliGRAM(s) IV Push every 8 hours  niCARdipine Infusion 5 mG/Hr (25 mL/Hr) IV Continuous <Continuous>  norepinephrine Infusion 0.05 MICROgram(s)/kG/Min (6.75 mL/Hr) IV Continuous <Continuous>  pantoprazole  Injectable 40 milliGRAM(s) IV Push daily  potassium chloride  10 mEq/50 mL IVPB 10 milliEquivalent(s) IV Intermittent every 1 hour  potassium chloride  10 mEq/50 mL IVPB 10 milliEquivalent(s) IV Intermittent every 1 hour  potassium chloride  10 mEq/50 mL IVPB 10 milliEquivalent(s) IV Intermittent every 1 hour  propofol Infusion 20 MICROgram(s)/kG/Min (8.64 mL/Hr) IV Continuous <Continuous>  sodium chloride 0.9%. 1000 milliLiter(s) (10 mL/Hr) IV Continuous <Continuous>  vasopressin Infusion 0.1 Unit(s)/Min (6 mL/Hr) IV Continuous <Continuous>    MEDICATIONS  (PRN):  oxyCODONE    5 mG/acetaminophen 325 mG 2 Tablet(s) Oral every 6 hours PRN Severe Pain (7 - 10)  oxyCODONE    5 mG/acetaminophen 325 mG 1 Tablet(s) Oral every 6 hours PRN Moderate Pain (4 - 6)    LABS:                        12.3   9.0   )-----------( 219      ( 23 Oct 2018 02:05 )             35.0     10-23    139  |  107  |  9   ----------------------------<  123<H>  5.2   |  22  |  0.88    Ca    7.9<L>      23 Oct 2018 02:05  Phos  3.3     10-22  Mg     2.8     10-22    TPro  5.4<L>  /  Alb  3.7  /  TBili  0.8  /  DBili  x   /  AST  168<H>  /  ALT  123<H>  /  AlkPhos  49  10-23    PT/INR - ( 23 Oct 2018 02:05 )   PT: 12.6 sec;   INR: 1.15 ratio         PTT - ( 23 Oct 2018 02:05 )  PTT:27.9 sec    CAPILLARY BLOOD GLUCOSE  118 (23 Oct 2018 07:00)  103 (23 Oct 2018 06:00)  116 (23 Oct 2018 05:00)  129 (23 Oct 2018 04:00)  134 (23 Oct 2018 03:00)  118 (23 Oct 2018 02:00)  119 (23 Oct 2018 01:00)  120 (23 Oct 2018 00:00)  122 (22 Oct 2018 23:00)  136 (22 Oct 2018 22:00)  134 (22 Oct 2018 21:00)  148 (22 Oct 2018 20:00)  126 (22 Oct 2018 19:00)      POCT Blood Glucose.: 185 mg/dL (23 Oct 2018 13:50)  POCT Blood Glucose.: 140 mg/dL (23 Oct 2018 12:43)  POCT Blood Glucose.: 151 mg/dL (23 Oct 2018 11:24)  POCT Blood Glucose.: 135 mg/dL (23 Oct 2018 10:21)  POCT Blood Glucose.: 114 mg/dL (23 Oct 2018 09:46)  POCT Blood Glucose.: 109 mg/dL (23 Oct 2018 08:21)  POCT Blood Glucose.: 118 mg/dL (23 Oct 2018 06:48)  POCT Blood Glucose.: 129 mg/dL (23 Oct 2018 03:52)  POCT Blood Glucose.: 134 mg/dL (23 Oct 2018 02:58)  POCT Blood Glucose.: 119 mg/dL (23 Oct 2018 00:57)  POCT Blood Glucose.: 120 mg/dL (22 Oct 2018 23:50)  POCT Blood Glucose.: 136 mg/dL (22 Oct 2018 21:51)  POCT Blood Glucose.: 148 mg/dL (22 Oct 2018 19:54)      ABG - ( 23 Oct 2018 09:27 )  pH, Arterial: 7.43  pH, Blood: x     /  pCO2: 39    /  pO2: 84    / HCO3: 26    / Base Excess: 1.6   /  SaO2: 97                  REVIEW OF SYSTEMS:  CONSTITUTIONAL: No fever, weight loss, or fatigue  EYES: No eye pain, visual disturbances, or discharge  ENMT:  No difficulty hearing, tinnitus, vertigo; No sinus or throat pain  NECK: No pain or stiffness  RESPIRATORY: No cough, wheezing, chills or hemoptysis; No shortness of breath  CARDIOVASCULAR: No chest pain, palpitations, dizziness, or leg swelling  GASTROINTESTINAL: No abdominal or epigastric pain. No nausea, vomiting, or hematemesis; No diarrhea or constipation. No melena or hematochezia.  GENITOURINARY: No dysuria, frequency, hematuria, or incontinence  NEUROLOGICAL: No headaches, memory loss, loss of strength, numbness, or tremors    Consultant(s) Notes Reviewed:  [x ] YES  [ ] NO    PHYSICAL EXAM:  GENERAL: NAD, well-groomed, well-developed ,not in any distress ,  HEAD:  Atraumatic, Normocephalic  EYES: EOMI, PERRLA, conjunctiva and sclera clear  ENMT: No tonsillar erythema, exudates, or enlargement; Moist mucous membranes, Good dentition, No lesions  NECK: Supple, No JVD, Normal thyroid  NERVOUS SYSTEM:  Alert & Oriented X3, No focal deficit   CHEST/LUNG: Good air entry bilateral with no  rales, rhonchi, wheezing, or rubs  HEART: Regular rate and rhythm; No murmurs, rubs, or gallops  ABDOMEN: Soft, Nontender, Nondistended; Bowel sounds present  EXTREMITIES:  2+ Peripheral Pulses, No clubbing, cyanosis, or edema  SKIN: No rashes or lesions    Care Discussed with Consultants/Other Providers [ x] YES  [ ] NO

## 2018-10-23 NOTE — AIRWAY REMOVAL NOTE  ADULT & PEDS - ARTIFICAL AIRWAY REMOVAL COMMENTS
Written order for extubation verified. The patient was identified by full name and birth date compared to the identification band. Present during the procedure was Jacquelin Garcia RN.

## 2018-10-23 NOTE — PROGRESS NOTE ADULT - SUBJECTIVE AND OBJECTIVE BOX
Subjective: Patient denies CP, SOB,     aspirin enteric coated 325 milliGRAM(s) Oral daily  atorvastatin 80 milliGRAM(s) Oral at bedtime  cefuroxime  IVPB 1500 milliGRAM(s) IV Intermittent every 8 hours  chlorhexidine 0.12% Liquid 5 milliLiter(s) Oral Mucosa every 4 hours  clopidogrel Tablet 75 milliGRAM(s) Oral daily  dextrose 50% Injectable 50 milliLiter(s) IV Push every 15 minutes  dextrose 50% Injectable 25 milliLiter(s) IV Push every 15 minutes  docusate sodium 100 milliGRAM(s) Oral three times a day  enoxaparin Injectable 40 milliGRAM(s) SubCutaneous daily  insulin Infusion 1 Unit(s)/Hr IV Continuous <Continuous>  meperidine     Injectable 25 milliGRAM(s) IV Push once  metoclopramide Injectable 10 milliGRAM(s) IV Push every 8 hours  oxyCODONE    5 mG/acetaminophen 325 mG 2 Tablet(s) Oral every 6 hours PRN  oxyCODONE    5 mG/acetaminophen 325 mG 1 Tablet(s) Oral every 6 hours PRN  pantoprazole    Tablet 40 milliGRAM(s) Oral before breakfast  sodium chloride 0.9%. 1000 milliLiter(s) IV Continuous <Continuous>                            12.3   9.0   )-----------( 219      ( 23 Oct 2018 02:05 )             35.0       Hemoglobin: 12.3 g/dL (10-23 @ 02:05)  Hemoglobin: 11.7 g/dL (10-22 @ 19:11)  Hemoglobin: 14.8 g/dL (10-22 @ 06:33)  Hemoglobin: 14.8 g/dL (10-21 @ 06:56)  Hemoglobin: 16.2 g/dL (10-20 @ 05:48)      10-23    139  |  107  |  9   ----------------------------<  123<H>  5.2   |  22  |  0.88    Ca    7.9<L>      23 Oct 2018 02:05  Phos  3.3     10-22  Mg     2.8     10-22    TPro  5.4<L>  /  Alb  3.7  /  TBili  0.8  /  DBili  x   /  AST  168<H>  /  ALT  123<H>  /  AlkPhos  49  10-23    Creatinine Trend: 0.88<--, 0.78<--, 0.83<--, 0.91<--, 0.96<--, 0.91<--    COAGS: PT/INR - ( 23 Oct 2018 02:05 )   PT: 12.6 sec;   INR: 1.15 ratio         PTT - ( 23 Oct 2018 02:05 )  PTT:27.9 sec    CARDIAC MARKERS ( 22 Oct 2018 19:13 )  x     / x     / 234 U/L / x     / 16.8 ng/mL        T(C): 37.5 (10-23-18 @ 08:00), Max: 37.8 (10-23-18 @ 07:00)  HR: 94 (10-23-18 @ 14:00) (89 - 114)  BP: --  RR: 23 (10-23-18 @ 14:00) (12 - 28)  SpO2: 92% (10-23-18 @ 14:00) (91% - 100%)  Wt(kg): --    I&O's Summary    22 Oct 2018 07:01  -  23 Oct 2018 07:00  --------------------------------------------------------  IN: 1799.7 mL / OUT: 3110 mL / NET: -1310.3 mL    23 Oct 2018 07:01  -  23 Oct 2018 15:33  --------------------------------------------------------  IN: 844.9 mL / OUT: 480 mL / NET: 364.9 mL      Appearance: Normal	  HEENT:   Normal oral mucosa, PERRL, EOMI	  Lymphatic: No lymphadenopathy , no edema  Cardiovascular: Normal S1 S2, No JVD, No murmurs , Peripheral pulses palpable 2+ bilaterally  Respiratory: Lungs clear to auscultation, normal effort 	  Gastrointestinal:  Soft, Non-tender, + BS	      TELEMETRY: SR	      DIAGNOSTIC TESTING:  [ ] Echocardiogram: < from: Transthoracic Echocardiogram (10.18.18 @ 12:46) >  Conclusions:  1. Normal left ventricular internal dimensions and wall  thicknesses.  2. Mild global left ventricular systolic dysfunction with  mild segmental abnormalities.  Mild hypokinesis of the base  to mid anteroseptal, septal walls.  3. Normal diastolic function  4. Normal right ventricular size and function.  *** No previous Echo exam.    < end of copied text >    [ ]  Catheterization:  -Cath with severe stenosis in OM1, LAD (which was functionally significant by IFR of 0.89) and RCA    [ ] Stress Test:  < from: Nuclear Stress Test-Exercise (10.17.18 @ 09:43) >  IMPRESSIONS:Abnormal Study  * Myocardial Perfusion SPECT results are abnormal at 90 %  of MPHR.  * There are medium sized, moderate defects in inferior and  inferoseptal walls that are reversible, suggestive of  ischemia.  * Post-stress gated wall motion analysis was performed  (LVEF = 52 %;LVEDV = 62 ml.), revealing mild hypokinesis  of the inferior wall. RV function appeared normal.  RV  function appeared normal.  *** No previous Nuclear/Stress exam.    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  s/p C3L , ASD closure      wean vent per ctu   GI / DVT prophylaxis. keep K>4, mag >2.0  wean pressors   - asa, statin   - glycemic control   -cont post op ABX   -d/c chest tube per CTS  - pain management.  D/W Dr Gibson

## 2018-10-23 NOTE — PROGRESS NOTE ADULT - ASSESSMENT
58 year old male current smoker with family history of CAD with DM-II who presented to [facility] ED 10/17 complaining of chest pain/pressure with associated left arm numbness/weakness.  Denies dizziness, syncope, edema, orthopnea and PND.  CT head was performed showing no CVA and neurology was consulted who thought his symptoms were secondary to neuropathy.  Underwent a Cardiac work up, R/O MI, underwent Nuclear stress test revealing EF 52%, medium sized, moderate defects in inferior and inferoseptal walls that are reversible, suggestive of ischemia.   In light of patients cardiac risk factors, symptoms and abnormal noninvasive test findings there is high suspicion for CAD. Still have left shoulder pain with numbness left hand thumb and index finger.        Problem/Recommendation - 1:  Problem: Chest pain with Multivessel CAD . Recommendation: S/P LIMA-LAD, SVG-OM1, SVG-Diag, ASD closure .On Aspirin, Plavix Statin  .  CTS and Cardiology helping.      Problem/Recommendation - 2:  ·  Problem: left Shoulder and arm pain with Numbness and tingling in left hand.  Recommendation: CT Head noted . Neurology help appreciated and outpt follow up. X Ray noted and tendinosis. PRN Pain meds.     Problem/Recommendation - 3:  ·  Problem: Diabetes mellitus type 2 in nonobese.  Recommendation: Sugars in good range . Holding PO meds . Lantus and SSI .     Problem/Recommendation - 4:  ·  Problem: HLD (hyperlipidemia).  Recommendation: Statin .

## 2018-10-23 NOTE — PHYSICAL THERAPY INITIAL EVALUATION ADULT - PLANNED THERAPY INTERVENTIONS, PT EVAL
balance training/bed mobility training/transfer training/gait training/stair negotiation GOAL: pt will ascend/descend 12 steps (I) with U HR and step to pattern in 4 weeks

## 2018-10-23 NOTE — PHYSICAL THERAPY INITIAL EVALUATION ADULT - PRECAUTIONS/LIMITATIONS, REHAB EVAL
cardiac precautions/sternal precautions/Transferred to  [facility] for CABG eval with Dr. Baldwin. VA Duplex B/L carotid 10/18: Focal calcific plaque without duplex evidence of hemodynamically significant stenosis. X-ray L ruben 10/18:+Anterior infraspinatus calcific tendinosis. Pt Found with multivessel CAD on cath- pt now s/p Atrial septal defect closure,  CABGx3 (LIMA-LAD, SVG-OM, SVG-Diag) on 10/22.

## 2018-10-23 NOTE — PROGRESS NOTE ADULT - PROBLEM SELECTOR PLAN 1
Insulin drip Will continue monitoring FS, log, will Follow up.  if switching to Basal bolus can start with 12 Lantus, and if eats 6 units pre-meals

## 2018-10-23 NOTE — PROGRESS NOTE ADULT - SUBJECTIVE AND OBJECTIVE BOX
Chief complaint  Patient is a 58y old  Male who presents with a chief complaint of Cardiac Surgery evaluation (23 Oct 2018 05:11)   Review of systems  Patient in chair, looks pale, ,  had no hypoglycemia.    Labs and Fingersticks  CAPILLARY BLOOD GLUCOSE  118 (23 Oct 2018 07:00)  103 (23 Oct 2018 06:00)  116 (23 Oct 2018 05:00)  129 (23 Oct 2018 04:00)  134 (23 Oct 2018 03:00)  118 (23 Oct 2018 02:00)  119 (23 Oct 2018 01:00)  120 (23 Oct 2018 00:00)  122 (22 Oct 2018 23:00)  136 (22 Oct 2018 22:00)  134 (22 Oct 2018 21:00)  148 (22 Oct 2018 20:00)  126 (22 Oct 2018 19:00)      POCT Blood Glucose.: 109 mg/dL (23 Oct 2018 08:21)  POCT Blood Glucose.: 118 mg/dL (23 Oct 2018 06:48)  POCT Blood Glucose.: 129 mg/dL (23 Oct 2018 03:52)  POCT Blood Glucose.: 134 mg/dL (23 Oct 2018 02:58)  POCT Blood Glucose.: 119 mg/dL (23 Oct 2018 00:57)  POCT Blood Glucose.: 120 mg/dL (22 Oct 2018 23:50)  POCT Blood Glucose.: 136 mg/dL (22 Oct 2018 21:51)  POCT Blood Glucose.: 148 mg/dL (22 Oct 2018 19:54)      Anion Gap, Serum: 10 (10-23 @ 02:05)  Anion Gap, Serum: 13 (10-22 @ 19:13)  Anion Gap, Serum: 15 (10-22 @ 06:33)      Calcium, Total Serum: 7.9 <L> (10-23 @ 02:05)  Calcium, Total Serum: 7.9 <L> (10-22 @ 19:13)  Calcium, Total Serum: 9.6 (10-22 @ 06:33)  Albumin, Serum: 3.7 (10-23 @ 02:05)  Albumin, Serum: 3.0 <L> (10-22 @ 19:13)    Alanine Aminotransferase (ALT/SGPT): 123 <H> (10-23 @ 02:05)  Alanine Aminotransferase (ALT/SGPT): 115 <H> (10-22 @ 19:13)  Alkaline Phosphatase, Serum: 49 (10-23 @ 02:05)  Alkaline Phosphatase, Serum: 46 (10-22 @ 19:13)  Aspartate Aminotransferase (AST/SGOT): 168 <H> (10-23 @ 02:05)  Aspartate Aminotransferase (AST/SGOT): 155 <H> (10-22 @ 19:13)        10-23    139  |  107  |  9   ----------------------------<  123<H>  5.2   |  22  |  0.88    Ca    7.9<L>      23 Oct 2018 02:05  Phos  3.3     10-22  Mg     2.8     10-22    TPro  5.4<L>  /  Alb  3.7  /  TBili  0.8  /  DBili  x   /  AST  168<H>  /  ALT  123<H>  /  AlkPhos  49  10-23                        12.3   9.0   )-----------( 219      ( 23 Oct 2018 02:05 )             35.0     Medications  MEDICATIONS  (STANDING):  atorvastatin 80 milliGRAM(s) Oral at bedtime  cefuroxime  IVPB 1500 milliGRAM(s) IV Intermittent every 8 hours  chlorhexidine 0.12% Liquid 5 milliLiter(s) Oral Mucosa every 4 hours  dexmedetomidine Infusion 0.7 MICROgram(s)/kG/Hr (12.6 mL/Hr) IV Continuous <Continuous>  dextrose 50% Injectable 50 milliLiter(s) IV Push every 15 minutes  dextrose 50% Injectable 25 milliLiter(s) IV Push every 15 minutes  DOBUTamine Infusion 2 MICROgram(s)/kG/Min (4.32 mL/Hr) IV Continuous <Continuous>  docusate sodium 100 milliGRAM(s) Oral three times a day  insulin Infusion 3 Unit(s)/Hr (3 mL/Hr) IV Continuous <Continuous>  insulin Infusion 1 Unit(s)/Hr (1 mL/Hr) IV Continuous <Continuous>  meperidine     Injectable 25 milliGRAM(s) IV Push once  metoclopramide Injectable 10 milliGRAM(s) IV Push every 8 hours  niCARdipine Infusion 5 mG/Hr (25 mL/Hr) IV Continuous <Continuous>  norepinephrine Infusion 0.05 MICROgram(s)/kG/Min (6.75 mL/Hr) IV Continuous <Continuous>  pantoprazole  Injectable 40 milliGRAM(s) IV Push daily  potassium chloride  10 mEq/50 mL IVPB 10 milliEquivalent(s) IV Intermittent every 1 hour  potassium chloride  10 mEq/50 mL IVPB 10 milliEquivalent(s) IV Intermittent every 1 hour  potassium chloride  10 mEq/50 mL IVPB 10 milliEquivalent(s) IV Intermittent every 1 hour  propofol Infusion 20 MICROgram(s)/kG/Min (8.64 mL/Hr) IV Continuous <Continuous>  sodium chloride 0.9%. 1000 milliLiter(s) (10 mL/Hr) IV Continuous <Continuous>  vasopressin Infusion 0.1 Unit(s)/Min (6 mL/Hr) IV Continuous <Continuous>      Physical Exam  General: Patient comfortable in bed  Vital Signs Last 12 Hrs  T(F): 99.5 (10-23-18 @ 08:00), Max: 100 (10-23-18 @ 07:00)  HR: 94 (10-23-18 @ 08:45) (89 - 105)  BP: --  BP(mean): --  RR: 16 (10-23-18 @ 08:45) (12 - 26)  SpO2: 92% (10-23-18 @ 08:45) (91% - 100%)  Neck: No palpable thyroid nodules.  CVS: S1S2, No murmurs  Respiratory: No wheezing, no crepitations  GI: Abdomen soft, bowel sounds positive  Musculoskeletal:  edema lower extremities.   Skin: No skin rashes, no ecchymosis    Diagnostics    Free Thyroxine, Serum: AM Sched. Collection: 20-Oct-2018 06:00 (10-19 @ 06:47)

## 2018-10-24 DIAGNOSIS — E78.5 HYPERLIPIDEMIA, UNSPECIFIED: ICD-10-CM

## 2018-10-24 DIAGNOSIS — I10 ESSENTIAL (PRIMARY) HYPERTENSION: ICD-10-CM

## 2018-10-24 LAB
ALBUMIN SERPL ELPH-MCNC: 4.1 G/DL — SIGNIFICANT CHANGE UP (ref 3.3–5)
ALP SERPL-CCNC: 36 U/L — LOW (ref 40–120)
ALT FLD-CCNC: 59 U/L — HIGH (ref 10–45)
ANION GAP SERPL CALC-SCNC: 12 MMOL/L — SIGNIFICANT CHANGE UP (ref 5–17)
AST SERPL-CCNC: 44 U/L — HIGH (ref 10–40)
BILIRUB SERPL-MCNC: 1 MG/DL — SIGNIFICANT CHANGE UP (ref 0.2–1.2)
BUN SERPL-MCNC: 8 MG/DL — SIGNIFICANT CHANGE UP (ref 7–23)
CALCIUM SERPL-MCNC: 8.9 MG/DL — SIGNIFICANT CHANGE UP (ref 8.4–10.5)
CHLORIDE SERPL-SCNC: 105 MMOL/L — SIGNIFICANT CHANGE UP (ref 96–108)
CO2 SERPL-SCNC: 21 MMOL/L — LOW (ref 22–31)
CREAT SERPL-MCNC: 0.83 MG/DL — SIGNIFICANT CHANGE UP (ref 0.5–1.3)
GAS PNL BLDA: SIGNIFICANT CHANGE UP
GLUCOSE SERPL-MCNC: 137 MG/DL — HIGH (ref 70–99)
HCT VFR BLD CALC: 28.9 % — LOW (ref 39–50)
HGB BLD-MCNC: 9.8 G/DL — LOW (ref 13–17)
MCHC RBC-ENTMCNC: 31.2 PG — SIGNIFICANT CHANGE UP (ref 27–34)
MCHC RBC-ENTMCNC: 33.8 GM/DL — SIGNIFICANT CHANGE UP (ref 32–36)
MCV RBC AUTO: 92.4 FL — SIGNIFICANT CHANGE UP (ref 80–100)
PLATELET # BLD AUTO: 162 K/UL — SIGNIFICANT CHANGE UP (ref 150–400)
POTASSIUM SERPL-MCNC: 4.4 MMOL/L — SIGNIFICANT CHANGE UP (ref 3.5–5.3)
POTASSIUM SERPL-SCNC: 4.4 MMOL/L — SIGNIFICANT CHANGE UP (ref 3.5–5.3)
PROT SERPL-MCNC: 6.3 G/DL — SIGNIFICANT CHANGE UP (ref 6–8.3)
RBC # BLD: 3.12 M/UL — LOW (ref 4.2–5.8)
RBC # FLD: 12.6 % — SIGNIFICANT CHANGE UP (ref 10.3–14.5)
SODIUM SERPL-SCNC: 138 MMOL/L — SIGNIFICANT CHANGE UP (ref 135–145)
WBC # BLD: 9.4 K/UL — SIGNIFICANT CHANGE UP (ref 3.8–10.5)
WBC # FLD AUTO: 9.4 K/UL — SIGNIFICANT CHANGE UP (ref 3.8–10.5)

## 2018-10-24 PROCEDURE — 71045 X-RAY EXAM CHEST 1 VIEW: CPT | Mod: 26,76

## 2018-10-24 PROCEDURE — 93010 ELECTROCARDIOGRAM REPORT: CPT

## 2018-10-24 RX ORDER — INSULIN NPH HUM/REG INSULIN HM 70-30/ML
10 VIAL (ML) SUBCUTANEOUS ONCE
Qty: 0 | Refills: 0 | Status: COMPLETED | OUTPATIENT
Start: 2018-10-24 | End: 2018-10-24

## 2018-10-24 RX ORDER — HYDROMORPHONE HYDROCHLORIDE 2 MG/ML
2 INJECTION INTRAMUSCULAR; INTRAVENOUS; SUBCUTANEOUS
Qty: 0 | Refills: 0 | Status: DISCONTINUED | OUTPATIENT
Start: 2018-10-24 | End: 2018-10-27

## 2018-10-24 RX ORDER — HYDROMORPHONE HYDROCHLORIDE 2 MG/ML
0.5 INJECTION INTRAMUSCULAR; INTRAVENOUS; SUBCUTANEOUS ONCE
Qty: 0 | Refills: 0 | Status: DISCONTINUED | OUTPATIENT
Start: 2018-10-24 | End: 2018-10-24

## 2018-10-24 RX ORDER — INSULIN LISPRO 100/ML
8 VIAL (ML) SUBCUTANEOUS
Qty: 0 | Refills: 0 | Status: DISCONTINUED | OUTPATIENT
Start: 2018-10-24 | End: 2018-10-27

## 2018-10-24 RX ORDER — INSULIN HUMAN 100 [IU]/ML
INJECTION, SOLUTION SUBCUTANEOUS
Qty: 0 | Refills: 0 | Status: DISCONTINUED | OUTPATIENT
Start: 2018-10-24 | End: 2018-10-27

## 2018-10-24 RX ORDER — HYDROMORPHONE HYDROCHLORIDE 2 MG/ML
0.25 INJECTION INTRAMUSCULAR; INTRAVENOUS; SUBCUTANEOUS ONCE
Qty: 0 | Refills: 0 | Status: DISCONTINUED | OUTPATIENT
Start: 2018-10-24 | End: 2018-10-24

## 2018-10-24 RX ORDER — ACETAMINOPHEN 500 MG
1000 TABLET ORAL ONCE
Qty: 0 | Refills: 0 | Status: DISCONTINUED | OUTPATIENT
Start: 2018-10-24 | End: 2018-10-27

## 2018-10-24 RX ORDER — HYDROMORPHONE HYDROCHLORIDE 2 MG/ML
4 INJECTION INTRAMUSCULAR; INTRAVENOUS; SUBCUTANEOUS
Qty: 0 | Refills: 0 | Status: DISCONTINUED | OUTPATIENT
Start: 2018-10-24 | End: 2018-10-27

## 2018-10-24 RX ORDER — INSULIN GLARGINE 100 [IU]/ML
20 INJECTION, SOLUTION SUBCUTANEOUS AT BEDTIME
Qty: 0 | Refills: 0 | Status: DISCONTINUED | OUTPATIENT
Start: 2018-10-24 | End: 2018-10-25

## 2018-10-24 RX ORDER — METOPROLOL TARTRATE 50 MG
25 TABLET ORAL EVERY 12 HOURS
Qty: 0 | Refills: 0 | Status: DISCONTINUED | OUTPATIENT
Start: 2018-10-24 | End: 2018-10-25

## 2018-10-24 RX ADMIN — HYDROMORPHONE HYDROCHLORIDE 0.5 MILLIGRAM(S): 2 INJECTION INTRAMUSCULAR; INTRAVENOUS; SUBCUTANEOUS at 06:45

## 2018-10-24 RX ADMIN — Medication 8 UNIT(S): at 17:15

## 2018-10-24 RX ADMIN — HYDROMORPHONE HYDROCHLORIDE 0.25 MILLIGRAM(S): 2 INJECTION INTRAMUSCULAR; INTRAVENOUS; SUBCUTANEOUS at 15:03

## 2018-10-24 RX ADMIN — HYDROMORPHONE HYDROCHLORIDE 0.5 MILLIGRAM(S): 2 INJECTION INTRAMUSCULAR; INTRAVENOUS; SUBCUTANEOUS at 06:30

## 2018-10-24 RX ADMIN — ENOXAPARIN SODIUM 40 MILLIGRAM(S): 100 INJECTION SUBCUTANEOUS at 11:39

## 2018-10-24 RX ADMIN — HYDROMORPHONE HYDROCHLORIDE 2 MILLIGRAM(S): 2 INJECTION INTRAMUSCULAR; INTRAVENOUS; SUBCUTANEOUS at 21:03

## 2018-10-24 RX ADMIN — ATORVASTATIN CALCIUM 80 MILLIGRAM(S): 80 TABLET, FILM COATED ORAL at 21:46

## 2018-10-24 RX ADMIN — Medication 100 MILLIGRAM(S): at 05:11

## 2018-10-24 RX ADMIN — Medication 25 MILLIGRAM(S): at 20:33

## 2018-10-24 RX ADMIN — Medication 100 MILLIGRAM(S): at 13:09

## 2018-10-24 RX ADMIN — Medication 8 UNIT(S): at 13:07

## 2018-10-24 RX ADMIN — Medication 10 MILLIGRAM(S): at 05:11

## 2018-10-24 RX ADMIN — CLOPIDOGREL BISULFATE 75 MILLIGRAM(S): 75 TABLET, FILM COATED ORAL at 11:39

## 2018-10-24 RX ADMIN — OXYCODONE AND ACETAMINOPHEN 2 TABLET(S): 5; 325 TABLET ORAL at 04:30

## 2018-10-24 RX ADMIN — OXYCODONE AND ACETAMINOPHEN 2 TABLET(S): 5; 325 TABLET ORAL at 11:38

## 2018-10-24 RX ADMIN — INSULIN HUMAN 2: 100 INJECTION, SOLUTION SUBCUTANEOUS at 13:09

## 2018-10-24 RX ADMIN — OXYCODONE AND ACETAMINOPHEN 2 TABLET(S): 5; 325 TABLET ORAL at 12:08

## 2018-10-24 RX ADMIN — Medication 10 MILLIGRAM(S): at 13:13

## 2018-10-24 RX ADMIN — Medication 10 UNIT(S): at 08:06

## 2018-10-24 RX ADMIN — Medication 100 MILLIGRAM(S): at 21:46

## 2018-10-24 RX ADMIN — PANTOPRAZOLE SODIUM 40 MILLIGRAM(S): 20 TABLET, DELAYED RELEASE ORAL at 08:28

## 2018-10-24 RX ADMIN — Medication 100 MILLIGRAM(S): at 03:42

## 2018-10-24 RX ADMIN — INSULIN GLARGINE 20 UNIT(S): 100 INJECTION, SOLUTION SUBCUTANEOUS at 21:52

## 2018-10-24 RX ADMIN — HYDROMORPHONE HYDROCHLORIDE 2 MILLIGRAM(S): 2 INJECTION INTRAMUSCULAR; INTRAVENOUS; SUBCUTANEOUS at 17:21

## 2018-10-24 RX ADMIN — HYDROMORPHONE HYDROCHLORIDE 0.25 MILLIGRAM(S): 2 INJECTION INTRAMUSCULAR; INTRAVENOUS; SUBCUTANEOUS at 15:15

## 2018-10-24 RX ADMIN — INSULIN HUMAN 2: 100 INJECTION, SOLUTION SUBCUTANEOUS at 21:52

## 2018-10-24 RX ADMIN — Medication 25 MILLIGRAM(S): at 09:55

## 2018-10-24 RX ADMIN — Medication 325 MILLIGRAM(S): at 11:43

## 2018-10-24 RX ADMIN — OXYCODONE AND ACETAMINOPHEN 2 TABLET(S): 5; 325 TABLET ORAL at 04:05

## 2018-10-24 RX ADMIN — HYDROMORPHONE HYDROCHLORIDE 2 MILLIGRAM(S): 2 INJECTION INTRAMUSCULAR; INTRAVENOUS; SUBCUTANEOUS at 20:33

## 2018-10-24 NOTE — PROGRESS NOTE ADULT - SUBJECTIVE AND OBJECTIVE BOX
S: no chest pain or sob; pt. transferred to step down       aspirin enteric coated 325 milliGRAM(s) Oral daily  atorvastatin 80 milliGRAM(s) Oral at bedtime  clopidogrel Tablet 75 milliGRAM(s) Oral daily  dextrose 50% Injectable 50 milliLiter(s) IV Push every 15 minutes  dextrose 50% Injectable 25 milliLiter(s) IV Push every 15 minutes  docusate sodium 100 milliGRAM(s) Oral three times a day  enoxaparin Injectable 40 milliGRAM(s) SubCutaneous daily  insulin glargine Injectable (LANTUS) 20 Unit(s) SubCutaneous at bedtime  insulin Infusion 1 Unit(s)/Hr IV Continuous <Continuous>  insulin lispro Injectable (HumaLOG) 8 Unit(s) SubCutaneous before breakfast  insulin lispro Injectable (HumaLOG) 8 Unit(s) SubCutaneous before lunch  insulin lispro Injectable (HumaLOG) 8 Unit(s) SubCutaneous before dinner  insulin regular  human corrective regimen sliding scale   SubCutaneous Before meals and at bedtime  metoprolol tartrate 25 milliGRAM(s) Oral every 12 hours  oxyCODONE    5 mG/acetaminophen 325 mG 2 Tablet(s) Oral every 6 hours PRN  oxyCODONE    5 mG/acetaminophen 325 mG 1 Tablet(s) Oral every 6 hours PRN  pantoprazole    Tablet 40 milliGRAM(s) Oral before breakfast  sodium chloride 0.9%. 1000 milliLiter(s) IV Continuous <Continuous>                            9.8    9.4   )-----------( 162      ( 24 Oct 2018 02:01 )             28.9       10-24    138  |  105  |  8   ----------------------------<  137<H>  4.4   |  21<L>  |  0.83    Ca    8.9      24 Oct 2018 02:01  Phos  3.3     10-22  Mg     2.8     10-22    TPro  6.3  /  Alb  4.1  /  TBili  1.0  /  DBili  x   /  AST  44<H>  /  ALT  59<H>  /  AlkPhos  36<L>  10-24      CARDIAC MARKERS ( 22 Oct 2018 19:13 )  x     / x     / 234 U/L / x     / 16.8 ng/mL        T(C): 36.3 (10-24-18 @ 07:00), Max: 36.7 (10-23-18 @ 19:00)  HR: 90 (10-24-18 @ 10:00) (84 - 101)  BP: 130/65 (10-24-18 @ 10:00) (130/65 - 130/65)  RR: 24 (10-24-18 @ 10:00) (15 - 28)  SpO2: 95% (10-24-18 @ 10:00) (88% - 98%)  Wt(kg): --    I&O's Summary    23 Oct 2018 07:01  -  24 Oct 2018 07:00  --------------------------------------------------------  IN: 1535.9 mL / OUT: 1560 mL / NET: -24.1 mL    24 Oct 2018 07:01  -  24 Oct 2018 13:30  --------------------------------------------------------  IN: 270 mL / OUT: 220 mL / NET: 50 mL        Appearance: Normal	  HEENT:   Normal oral mucosa, PERRL, EOMI	  Lymphatic: No lymphadenopathy , no edema  Cardiovascular: Normal S1 S2, No JVD, No murmurs , Peripheral pulses palpable 2+ bilaterally  Respiratory: Lungs clear to auscultation, normal effort 	  Gastrointestinal:  Soft, Non-tender, + BS	      TELEMETRY: SR	      DIAGNOSTIC TESTING:  [ ] Echocardiogram: < from: Transthoracic Echocardiogram (10.18.18 @ 12:46) >  Conclusions:  1. Normal left ventricular internal dimensions and wall  thicknesses.  2. Mild global left ventricular systolic dysfunction with  mild segmental abnormalities.  Mild hypokinesis of the base  to mid anteroseptal, septal walls.  3. Normal diastolic function  4. Normal right ventricular size and function.  *** No previous Echo exam.    < end of copied text >    [ ]  Catheterization:  -Cath with severe stenosis in OM1, LAD (which was functionally significant by IFR of 0.89) and RCA    [ ] Stress Test:  < from: Nuclear Stress Test-Exercise (10.17.18 @ 09:43) >  IMPRESSIONS:Abnormal Study  * Myocardial Perfusion SPECT results are abnormal at 90 %  of MPHR.  * There are medium sized, moderate defects in inferior and  inferoseptal walls that are reversible, suggestive of  ischemia.  * Post-stress gated wall motion analysis was performed  (LVEF = 52 %;LVEDV = 62 ml.), revealing mild hypokinesis  of the inferior wall. RV function appeared normal.  RV  function appeared normal.  *** No previous Nuclear/Stress exam.    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  s/p C3L , ASD closure  POD 1    -continue with antiplatelet therapy for cad  -on dapt due to severely calcified cad  -continue with supportive care  -follow up cts    Linda Hoffman MD

## 2018-10-24 NOTE — PROGRESS NOTE ADULT - PROBLEM SELECTOR PLAN 1
C3L POD#2  ASA, statin, metoprolol 25mg BID as tolerated  mobilize OOB, PT consulted  pulm toileting with incentive spirometer  wound care and assessment  pain control  High flow nasal cannula, wean supplemental oxygenation as tolerated

## 2018-10-24 NOTE — PROGRESS NOTE ADULT - SUBJECTIVE AND OBJECTIVE BOX
Chief complaint  Patient is a 58y old  Male who presents with a chief complaint of Cardiac Surgery evaluation (24 Oct 2018 09:35)   Review of systems  Patient in bed, looks comfortable, no fever,  had no hypoglycemia.    Labs and Fingersticks  CAPILLARY BLOOD GLUCOSE  130 (24 Oct 2018 09:00)  97 (24 Oct 2018 08:00)  109 (24 Oct 2018 07:00)  125 (24 Oct 2018 06:00)  125 (24 Oct 2018 05:00)  149 (24 Oct 2018 04:00)  155 (24 Oct 2018 03:00)  136 (24 Oct 2018 02:00)  101 (24 Oct 2018 01:00)  118 (24 Oct 2018 00:00)  118 (23 Oct 2018 23:00)  132 (23 Oct 2018 22:00)  117 (23 Oct 2018 21:00)  112 (23 Oct 2018 20:00)  147 (23 Oct 2018 18:30)      POCT Blood Glucose.: 97 mg/dL (24 Oct 2018 08:02)  POCT Blood Glucose.: 109 mg/dL (24 Oct 2018 06:49)  POCT Blood Glucose.: 124 mg/dL (24 Oct 2018 06:13)  POCT Blood Glucose.: 149 mg/dL (24 Oct 2018 04:05)  POCT Blood Glucose.: 155 mg/dL (24 Oct 2018 03:01)  POCT Blood Glucose.: 101 mg/dL (24 Oct 2018 01:07)  POCT Blood Glucose.: 118 mg/dL (23 Oct 2018 23:56)  POCT Blood Glucose.: 132 mg/dL (23 Oct 2018 21:52)  POCT Blood Glucose.: 117 mg/dL (23 Oct 2018 21:06)  POCT Blood Glucose.: 147 mg/dL (23 Oct 2018 18:30)  POCT Blood Glucose.: 160 mg/dL (23 Oct 2018 17:35)  POCT Blood Glucose.: 185 mg/dL (23 Oct 2018 13:50)  POCT Blood Glucose.: 140 mg/dL (23 Oct 2018 12:43)      Anion Gap, Serum: 12 (10-24 @ 02:01)  Anion Gap, Serum: 10 (10-23 @ 02:05)  Anion Gap, Serum: 13 (10-22 @ 19:13)      Calcium, Total Serum: 8.9 (10-24 @ 02:01)  Calcium, Total Serum: 7.9 <L> (10-23 @ 02:05)  Calcium, Total Serum: 7.9 <L> (10-22 @ 19:13)  Albumin, Serum: 4.1 (10-24 @ 02:01)  Albumin, Serum: 3.7 (10-23 @ 02:05)  Albumin, Serum: 3.0 <L> (10-22 @ 19:13)    Alanine Aminotransferase (ALT/SGPT): 59 <H> (10-24 @ 02:01)  Alanine Aminotransferase (ALT/SGPT): 123 <H> (10-23 @ 02:05)  Alanine Aminotransferase (ALT/SGPT): 115 <H> (10-22 @ 19:13)  Alkaline Phosphatase, Serum: 36 <L> (10-24 @ 02:01)  Alkaline Phosphatase, Serum: 49 (10-23 @ 02:05)  Alkaline Phosphatase, Serum: 46 (10-22 @ 19:13)  Aspartate Aminotransferase (AST/SGOT): 44 <H> (10-24 @ 02:01)  Aspartate Aminotransferase (AST/SGOT): 168 <H> (10-23 @ 02:05)  Aspartate Aminotransferase (AST/SGOT): 155 <H> (10-22 @ 19:13)        10-24    138  |  105  |  8   ----------------------------<  137<H>  4.4   |  21<L>  |  0.83    Ca    8.9      24 Oct 2018 02:01  Phos  3.3     10-22  Mg     2.8     10-22    TPro  6.3  /  Alb  4.1  /  TBili  1.0  /  DBili  x   /  AST  44<H>  /  ALT  59<H>  /  AlkPhos  36<L>  10-24                        9.8    9.4   )-----------( 162      ( 24 Oct 2018 02:01 )             28.9     Medications  MEDICATIONS  (STANDING):  aspirin enteric coated 325 milliGRAM(s) Oral daily  atorvastatin 80 milliGRAM(s) Oral at bedtime  clopidogrel Tablet 75 milliGRAM(s) Oral daily  dextrose 50% Injectable 50 milliLiter(s) IV Push every 15 minutes  dextrose 50% Injectable 25 milliLiter(s) IV Push every 15 minutes  docusate sodium 100 milliGRAM(s) Oral three times a day  enoxaparin Injectable 40 milliGRAM(s) SubCutaneous daily  insulin glargine Injectable (LANTUS) 20 Unit(s) SubCutaneous at bedtime  insulin Infusion 1 Unit(s)/Hr (1 mL/Hr) IV Continuous <Continuous>  insulin lispro Injectable (HumaLOG) 8 Unit(s) SubCutaneous before breakfast  insulin lispro Injectable (HumaLOG) 8 Unit(s) SubCutaneous before lunch  insulin lispro Injectable (HumaLOG) 8 Unit(s) SubCutaneous before dinner  insulin regular  human corrective regimen sliding scale   SubCutaneous Before meals and at bedtime  metoclopramide Injectable 10 milliGRAM(s) IV Push every 8 hours  metoprolol tartrate 25 milliGRAM(s) Oral every 12 hours  pantoprazole    Tablet 40 milliGRAM(s) Oral before breakfast  sodium chloride 0.9%. 1000 milliLiter(s) (10 mL/Hr) IV Continuous <Continuous>      Physical Exam  General: Patient comfortable in bed  Vital Signs Last 12 Hrs  T(F): 97.3 (10-24-18 @ 07:00), Max: 97.3 (10-24-18 @ 03:00)  HR: 90 (10-24-18 @ 10:00) (84 - 92)  BP: 130/65 (10-24-18 @ 10:00) (130/65 - 130/65)  BP(mean): 90 (10-24-18 @ 10:00) (90 - 90)  RR: 24 (10-24-18 @ 10:00) (16 - 24)  SpO2: 95% (10-24-18 @ 10:00) (92% - 97%)  Neck: No palpable thyroid nodules.  CVS: S1S2, No murmurs  Respiratory: No wheezing, no crepitations  GI: Abdomen soft, bowel sounds positive  Musculoskeletal:  edema lower extremities.   Skin: No skin rashes, no ecchymosis    Diagnostics    Free Thyroxine, Serum: AM Sched. Collection: 20-Oct-2018 06:00 (10-19 @ 06:47)

## 2018-10-24 NOTE — PROGRESS NOTE ADULT - ASSESSMENT
58 year old male current smoker with family history of CAD with DM-II who presented to [facility] ED 10/17 complaining of chest pain/pressure with associated left arm numbness/weakness.  Denies dizziness, syncope, edema, orthopnea and PND.  CT head was performed showing no CVA and neurology was consulted who thought his symptoms were secondary to neuropathy.  Underwent a Cardiac work up, R/O MI, underwent Nuclear stress test revealing EF 52%, medium sized, moderate defects in inferior and inferoseptal walls that are reversible, suggestive of ischemia.   In light of patients cardiac risk factors, symptoms and abnormal noninvasive test findings there is high suspicion for CAD. Still have left shoulder pain with numbness left hand thumb and index finger.        Problem/Recommendation - 1:  Problem: Chest pain with Multivessel CAD . Recommendation: S/P LIMA-LAD, SVG-OM1, SVG-Diag, ASD closure .On Aspirin, Plavix Statin  .  CTS and Cardiology helping.      Problem/Recommendation - 2:  ·  Problem: left Shoulder and arm pain with Numbness and tingling in left hand.  Recommendation: CT Head noted . Neurology help appreciated and outpt follow up. X Ray noted and tendinosis. PRN Pain meds.     Problem/Recommendation - 3:  ·  Problem: Diabetes mellitus type 2 in nonobese.  Recommendation: Sugars in good range . Holding PO meds . Lantus and SSI .     Problem/Recommendation - 4:  ·  Problem: HLD (hyperlipidemia).  Recommendation: Statin .

## 2018-10-24 NOTE — PROGRESS NOTE ADULT - ASSESSMENT
58 year old male current smoker with family history of CAD with DM-II who presented to [facility] ED 10/17 complaining of chest pain/pressure with associated left arm numbness/weakness.  Denies dizziness, syncope, edema, orthopnea and PND.  CT head was performed showing no CVA and neurology was consulted who thought his symptoms were secondary to neuropathy.  Underwent a Cardiac work up, R/O MI, underwent Nuclear stress test revealing EF 52%, medium sized, moderate defects in inferior and inferoseptal walls that are reversible, suggestive of ischemia.   In light of patients cardiac risk factors, symptoms and abnormal noninvasive test findings there is high suspicion for CAD. Still have left shoulder pain with numbness left hand thumb and index finger.       10/22: Atrial septal defect closure, CABGx3 (LIMA-LAD, SVG-OM, SVG-Diag)  10/23: care per [facility], weaning off vaso pressors, dobutamine gtt weaned off, remains on insulin gtt for glycemic control, endo         Problem/Recommendation - 1:  Problem: Chest pain with Multivessel CAD . Recommendation: S/P LIMA-LAD, SVG-OM1, SVG-Diag, ASD closure .On Aspirin, Plavix Statin  .  CTS and Cardiology helping.      Problem/Recommendation - 2:  ·  Problem: left Shoulder and arm pain with Numbness and tingling in left hand.  Recommendation: CT Head noted . Neurology help appreciated and outpt follow up. X Ray noted and tendinosis. PRN Pain meds.     Problem/Recommendation - 3:  ·  Problem: Diabetes mellitus type 2 in nonobese.  Recommendation: Sugars in good range . Holding PO meds . Lantus and SSI .     Problem/Recommendation - 4:  ·  Problem: HLD (hyperlipidemia).  Recommendation: Statin . 58 year old male current smoker with family history of CAD with DM-II who presented to [facility] ED 10/17 complaining of chest pain/pressure with associated left arm numbness/weakness.  Denies dizziness, syncope, edema, orthopnea and PND.  CT head was performed showing no CVA and neurology was consulted who thought his symptoms were secondary to neuropathy.  Underwent a Cardiac work up, R/O MI, underwent Nuclear stress test revealing EF 52%, medium sized, moderate defects in inferior and inferoseptal walls that are reversible, suggestive of ischemia.   In light of patients cardiac risk factors, symptoms and abnormal noninvasive test findings there is high suspicion for CAD. Still have left shoulder pain with numbness left hand thumb and index finger.       10/22: Atrial septal defect closure, CABGx3 (LIMA-LAD, SVG-OM, SVG-Diag)  10/23: care per [facility], weaning off vaso pressors, dobutamine gtt weaned off, remains on insulin gtt for glycemic control, endo consult appreciated  10/24: transferred to Stepdown, on high flow nasal cannula, remains hemodynamically stable, no arrhythmias NSR.

## 2018-10-24 NOTE — PROGRESS NOTE ADULT - SUBJECTIVE AND OBJECTIVE BOX
LABS:                9.8                  138  | 21   | 8            9.4   >-----------< 162     ------------------------< 137                   28.9                 4.4  | 105  | 0.83                                         Ca 8.9   Mg x     Ph x          VITAL SIGNS    Telemetry:      Vital Signs Last 24 Hrs  T(C): 36.3 (10-24-18 @ 07:00), Max: 36.7 (10-23-18 @ 19:00)  T(F): 97.3 (10-24-18 @ 07:00), Max: 98.1 (10-23-18 @ 19:00)  HR: 90 (10-24-18 @ 10:00) (84 - 101)  BP: 130/65 (10-24-18 @ 10:00) (130/65 - 130/65)  RR: 24 (10-24-18 @ 10:00) (15 - 28)  SpO2: 95% (10-24-18 @ 10:00) (88% - 98%)                   10-23 @ 07:01  -  10-24 @ 07:00  --------------------------------------------------------  IN: 1535.9 mL / OUT: 1560 mL / NET: -24.1 mL    10-24 @ 07:01  -  10-24 @ 13:50  --------------------------------------------------------  IN: 270 mL / OUT: 220 mL / NET: 50 mL          Daily     Daily             CAPILLARY BLOOD GLUCOSE  130 (24 Oct 2018 09:00)  97 (24 Oct 2018 08:00)  109 (24 Oct 2018 07:00)  125 (24 Oct 2018 06:00)  125 (24 Oct 2018 05:00)  149 (24 Oct 2018 04:00)  155 (24 Oct 2018 03:00)  136 (24 Oct 2018 02:00)  101 (24 Oct 2018 01:00)  118 (24 Oct 2018 00:00)  118 (23 Oct 2018 23:00)  132 (23 Oct 2018 22:00)  117 (23 Oct 2018 21:00)  112 (23 Oct 2018 20:00)  147 (23 Oct 2018 18:30)      POCT Blood Glucose.: 170 mg/dL (24 Oct 2018 12:32)  POCT Blood Glucose.: 97 mg/dL (24 Oct 2018 08:02)  POCT Blood Glucose.: 109 mg/dL (24 Oct 2018 06:49)  POCT Blood Glucose.: 124 mg/dL (24 Oct 2018 06:13)  POCT Blood Glucose.: 149 mg/dL (24 Oct 2018 04:05)  POCT Blood Glucose.: 155 mg/dL (24 Oct 2018 03:01)  POCT Blood Glucose.: 101 mg/dL (24 Oct 2018 01:07)  POCT Blood Glucose.: 118 mg/dL (23 Oct 2018 23:56)  POCT Blood Glucose.: 132 mg/dL (23 Oct 2018 21:52)  POCT Blood Glucose.: 117 mg/dL (23 Oct 2018 21:06)  POCT Blood Glucose.: 147 mg/dL (23 Oct 2018 18:30)  POCT Blood Glucose.: 160 mg/dL (23 Oct 2018 17:35)            Drains:     MS         [  ] Drainage:                 L Pleural  [  ]  Drainage:                R Pleural  [  ]  Drainage:    Pacing Wires        [  ]   Settings:                                  Isolated  [  ]    Coumadin    [ ] YES          [  ]      NO                                   PHYSICAL EXAM          PHYSICAL EXAM:  GENERAL: NAD, well-groomed, well-developed, not in any distress.  HEAD:  Atraumatic, Normocephalic  EYES: EOMI, PERRLA, conjunctiva and sclera clear  NECK: Supple, No JVD, Normal thyroid  NEURO:  Alert & Oriented X3, No focal deficit, OOB ambulating with steady gait  CHEST/LUNG: clear to auscultation, no adventitious breath sounds, Mediastinal ARSLAN bulb in placed  STERNAL WOUND: mid-sternal opsite dsng, CDI  LLE incision: CDI, ace wrap in place  HEART: Regular rate and rhythm; No murmurs, rubs, or gallops.  ABDOMEN: Soft, Nontender, Nondistended; Bowel sounds present, tolerating dietary PO intake.  EXTREMITIES:  2+ Peripheral Pulses, No clubbing, cyanosis, trace BLE edema +1, extremities well perfused   SKIN: No rashes or lesions      MEDICATIONS:    aspirin enteric coated 325 milliGRAM(s) Oral daily  atorvastatin 80 milliGRAM(s) Oral at bedtime  clopidogrel Tablet 75 milliGRAM(s) Oral daily  dextrose 50% Injectable 50 milliLiter(s) IV Push every 15 minutes  dextrose 50% Injectable 25 milliLiter(s) IV Push every 15 minutes  docusate sodium 100 milliGRAM(s) Oral three times a day  enoxaparin Injectable 40 milliGRAM(s) SubCutaneous daily  insulin glargine Injectable (LANTUS) 20 Unit(s) SubCutaneous at bedtime  insulin Infusion 1 Unit(s)/Hr IV Continuous <Continuous>  insulin lispro Injectable (HumaLOG) 8 Unit(s) SubCutaneous before breakfast  insulin lispro Injectable (HumaLOG) 8 Unit(s) SubCutaneous before lunch  insulin lispro Injectable (HumaLOG) 8 Unit(s) SubCutaneous before dinner  insulin regular  human corrective regimen sliding scale   SubCutaneous Before meals and at bedtime  metoprolol tartrate 25 milliGRAM(s) Oral every 12 hours  oxyCODONE    5 mG/acetaminophen 325 mG 2 Tablet(s) Oral every 6 hours PRN  oxyCODONE    5 mG/acetaminophen 325 mG 1 Tablet(s) Oral every 6 hours PRN  pantoprazole    Tablet 40 milliGRAM(s) Oral before breakfast  sodium chloride 0.9%. 1000 milliLiter(s) IV Continuous <Continuous>                    Physical Therapy Rec:   Home  [  ]   Home w/ PT  [  ]  Rehab  [  ]  Discussed with Cardiothoracic Team at AM rounds. LABS:                9.8                  138  | 21   | 8            9.4   >-----------< 162     ------------------------< 137                   28.9                 4.4  | 105  | 0.83                                         Ca 8.9   Mg x     Ph x          VITAL SIGNS    Telemetry:    nsr 80  Vital Signs Last 24 Hrs  T(C): 36.3 (10-24-18 @ 07:00), Max: 36.7 (10-23-18 @ 19:00)  T(F): 97.3 (10-24-18 @ 07:00), Max: 98.1 (10-23-18 @ 19:00)  HR: 90 (10-24-18 @ 10:00) (84 - 101)  BP: 130/65 (10-24-18 @ 10:00) (130/65 - 130/65)  RR: 24 (10-24-18 @ 10:00) (15 - 28)  SpO2: 95% (10-24-18 @ 10:00) (88% - 98%)                   10-23 @ 07:01  -  10-24 @ 07:00  --------------------------------------------------------  IN: 1535.9 mL / OUT: 1560 mL / NET: -24.1 mL    10-24 @ 07:01  -  10-24 @ 13:50  --------------------------------------------------------  IN: 270 mL / OUT: 220 mL / NET: 50 mL          Daily     Daily             CAPILLARY BLOOD GLUCOSE  130 (24 Oct 2018 09:00)  97 (24 Oct 2018 08:00)  109 (24 Oct 2018 07:00)  125 (24 Oct 2018 06:00)  125 (24 Oct 2018 05:00)  149 (24 Oct 2018 04:00)  155 (24 Oct 2018 03:00)  136 (24 Oct 2018 02:00)  101 (24 Oct 2018 01:00)  118 (24 Oct 2018 00:00)  118 (23 Oct 2018 23:00)  132 (23 Oct 2018 22:00)  117 (23 Oct 2018 21:00)  112 (23 Oct 2018 20:00)  147 (23 Oct 2018 18:30)      POCT Blood Glucose.: 170 mg/dL (24 Oct 2018 12:32)  POCT Blood Glucose.: 97 mg/dL (24 Oct 2018 08:02)  POCT Blood Glucose.: 109 mg/dL (24 Oct 2018 06:49)  POCT Blood Glucose.: 124 mg/dL (24 Oct 2018 06:13)  POCT Blood Glucose.: 149 mg/dL (24 Oct 2018 04:05)  POCT Blood Glucose.: 155 mg/dL (24 Oct 2018 03:01)  POCT Blood Glucose.: 101 mg/dL (24 Oct 2018 01:07)  POCT Blood Glucose.: 118 mg/dL (23 Oct 2018 23:56)  POCT Blood Glucose.: 132 mg/dL (23 Oct 2018 21:52)  POCT Blood Glucose.: 117 mg/dL (23 Oct 2018 21:06)  POCT Blood Glucose.: 147 mg/dL (23 Oct 2018 18:30)  POCT Blood Glucose.: 160 mg/dL (23 Oct 2018 17:35)            Drains:     MS         [ x ] Drainage:                 L Pleural  [  ]  Drainage:                R Pleural  [  ]  Drainage:    Pacing Wires        [ ]   Settings:                                  Isolated  [  ]    Coumadin    [ ] YES          [  ]      NO                                   PHYSICAL EXAM          PHYSICAL EXAM:  GENERAL: NAD, well-groomed, well-developed, not in any distress.  HEAD:  Atraumatic, Normocephalic  EYES: EOMI, PERRLA, conjunctiva and sclera clear  NECK: Supple, No JVD, Normal thyroid  NEURO:  Alert & Oriented X3, No focal deficit, OOB ambulating with steady gait  CHEST/LUNG: clear to auscultation, no adventitious breath sounds, Mediastinal ARSLAN bulb in placed  STERNAL WOUND: mid-sternal opsite dsng, CDI  LLE incision: CDI, ace wrap in place  HEART: Regular rate and rhythm; No murmurs, rubs, or gallops.  ABDOMEN: Soft, Nontender, Nondistended; Bowel sounds present, tolerating dietary PO intake.  EXTREMITIES:  2+ Peripheral Pulses, No clubbing, cyanosis, trace BLE edema +1, extremities well perfused   SKIN: No rashes or lesions      MEDICATIONS:    aspirin enteric coated 325 milliGRAM(s) Oral daily  atorvastatin 80 milliGRAM(s) Oral at bedtime  clopidogrel Tablet 75 milliGRAM(s) Oral daily  dextrose 50% Injectable 50 milliLiter(s) IV Push every 15 minutes  dextrose 50% Injectable 25 milliLiter(s) IV Push every 15 minutes  docusate sodium 100 milliGRAM(s) Oral three times a day  enoxaparin Injectable 40 milliGRAM(s) SubCutaneous daily  insulin glargine Injectable (LANTUS) 20 Unit(s) SubCutaneous at bedtime  insulin Infusion 1 Unit(s)/Hr IV Continuous <Continuous>  insulin lispro Injectable (HumaLOG) 8 Unit(s) SubCutaneous before breakfast  insulin lispro Injectable (HumaLOG) 8 Unit(s) SubCutaneous before lunch  insulin lispro Injectable (HumaLOG) 8 Unit(s) SubCutaneous before dinner  insulin regular  human corrective regimen sliding scale   SubCutaneous Before meals and at bedtime  metoprolol tartrate 25 milliGRAM(s) Oral every 12 hours  oxyCODONE    5 mG/acetaminophen 325 mG 2 Tablet(s) Oral every 6 hours PRN  oxyCODONE    5 mG/acetaminophen 325 mG 1 Tablet(s) Oral every 6 hours PRN  pantoprazole    Tablet 40 milliGRAM(s) Oral before breakfast  sodium chloride 0.9%. 1000 milliLiter(s) IV Continuous <Continuous>                    Physical Therapy Rec:   Home  [  ]   Home w/ PT  [  ]  Rehab  [  ]  Discussed with Cardiothoracic Team at AM rounds.

## 2018-10-24 NOTE — PROGRESS NOTE ADULT - SUBJECTIVE AND OBJECTIVE BOX
INTERVAL HPI/OVERNIGHT EVENTS:Ambualting today . seen in CTICU earlier .   Vital Signs Last 24 Hrs  T(C): 36.3 (24 Oct 2018 07:00), Max: 36.7 (23 Oct 2018 19:00)  T(F): 97.3 (24 Oct 2018 07:00), Max: 98.1 (23 Oct 2018 19:00)  HR: 90 (24 Oct 2018 10:00) (84 - 101)  BP: 130/65 (24 Oct 2018 10:00) (130/65 - 130/65)  BP(mean): 90 (24 Oct 2018 10:00) (90 - 90)  RR: 24 (24 Oct 2018 10:00) (15 - 28)  SpO2: 95% (24 Oct 2018 10:00) (88% - 98%)  I&O's Summary    23 Oct 2018 07:01  -  24 Oct 2018 07:00  --------------------------------------------------------  IN: 1535.9 mL / OUT: 1560 mL / NET: -24.1 mL    24 Oct 2018 07:01  -  24 Oct 2018 12:20  --------------------------------------------------------  IN: 270 mL / OUT: 220 mL / NET: 50 mL      MEDICATIONS  (STANDING):  aspirin enteric coated 325 milliGRAM(s) Oral daily  atorvastatin 80 milliGRAM(s) Oral at bedtime  clopidogrel Tablet 75 milliGRAM(s) Oral daily  dextrose 50% Injectable 50 milliLiter(s) IV Push every 15 minutes  dextrose 50% Injectable 25 milliLiter(s) IV Push every 15 minutes  docusate sodium 100 milliGRAM(s) Oral three times a day  enoxaparin Injectable 40 milliGRAM(s) SubCutaneous daily  insulin glargine Injectable (LANTUS) 20 Unit(s) SubCutaneous at bedtime  insulin Infusion 1 Unit(s)/Hr (1 mL/Hr) IV Continuous <Continuous>  insulin lispro Injectable (HumaLOG) 8 Unit(s) SubCutaneous before breakfast  insulin lispro Injectable (HumaLOG) 8 Unit(s) SubCutaneous before lunch  insulin lispro Injectable (HumaLOG) 8 Unit(s) SubCutaneous before dinner  insulin regular  human corrective regimen sliding scale   SubCutaneous Before meals and at bedtime  metoclopramide Injectable 10 milliGRAM(s) IV Push every 8 hours  metoprolol tartrate 25 milliGRAM(s) Oral every 12 hours  pantoprazole    Tablet 40 milliGRAM(s) Oral before breakfast  sodium chloride 0.9%. 1000 milliLiter(s) (10 mL/Hr) IV Continuous <Continuous>    MEDICATIONS  (PRN):  oxyCODONE    5 mG/acetaminophen 325 mG 2 Tablet(s) Oral every 6 hours PRN Severe Pain (7 - 10)  oxyCODONE    5 mG/acetaminophen 325 mG 1 Tablet(s) Oral every 6 hours PRN Moderate Pain (4 - 6)    LABS:                        9.8    9.4   )-----------( 162      ( 24 Oct 2018 02:01 )             28.9     10-24    138  |  105  |  8   ----------------------------<  137<H>  4.4   |  21<L>  |  0.83    Ca    8.9      24 Oct 2018 02:01  Phos  3.3     10-22  Mg     2.8     10-22    TPro  6.3  /  Alb  4.1  /  TBili  1.0  /  DBili  x   /  AST  44<H>  /  ALT  59<H>  /  AlkPhos  36<L>  10-24    PT/INR - ( 23 Oct 2018 02:05 )   PT: 12.6 sec;   INR: 1.15 ratio         PTT - ( 23 Oct 2018 02:05 )  PTT:27.9 sec    CAPILLARY BLOOD GLUCOSE  130 (24 Oct 2018 09:00)  97 (24 Oct 2018 08:00)  109 (24 Oct 2018 07:00)  125 (24 Oct 2018 06:00)  125 (24 Oct 2018 05:00)  149 (24 Oct 2018 04:00)  155 (24 Oct 2018 03:00)  136 (24 Oct 2018 02:00)  101 (24 Oct 2018 01:00)  118 (24 Oct 2018 00:00)  118 (23 Oct 2018 23:00)  132 (23 Oct 2018 22:00)  117 (23 Oct 2018 21:00)  112 (23 Oct 2018 20:00)  147 (23 Oct 2018 18:30)      POCT Blood Glucose.: 97 mg/dL (24 Oct 2018 08:02)  POCT Blood Glucose.: 109 mg/dL (24 Oct 2018 06:49)  POCT Blood Glucose.: 124 mg/dL (24 Oct 2018 06:13)  POCT Blood Glucose.: 149 mg/dL (24 Oct 2018 04:05)  POCT Blood Glucose.: 155 mg/dL (24 Oct 2018 03:01)  POCT Blood Glucose.: 101 mg/dL (24 Oct 2018 01:07)  POCT Blood Glucose.: 118 mg/dL (23 Oct 2018 23:56)  POCT Blood Glucose.: 132 mg/dL (23 Oct 2018 21:52)  POCT Blood Glucose.: 117 mg/dL (23 Oct 2018 21:06)  POCT Blood Glucose.: 147 mg/dL (23 Oct 2018 18:30)  POCT Blood Glucose.: 160 mg/dL (23 Oct 2018 17:35)  POCT Blood Glucose.: 185 mg/dL (23 Oct 2018 13:50)  POCT Blood Glucose.: 140 mg/dL (23 Oct 2018 12:43)      ABG - ( 24 Oct 2018 09:13 )  pH, Arterial: 7.43  pH, Blood: x     /  pCO2: 34    /  pO2: 73    / HCO3: 22    / Base Excess: -1.7  /  SaO2: 95                  REVIEW OF SYSTEMS:  CONSTITUTIONAL: No fever, weight loss, or fatigue  EYES: No eye pain, visual disturbances, or discharge  ENMT:  No difficulty hearing, tinnitus, vertigo; No sinus or throat pain  NECK: No pain or stiffness  RESPIRATORY: No cough, wheezing, chills or hemoptysis; No shortness of breath  CARDIOVASCULAR: No chest pain, palpitations, dizziness, or leg swelling  GASTROINTESTINAL: No abdominal or epigastric pain. No nausea, vomiting, or hematemesis; No diarrhea or constipation. No melena or hematochezia.  GENITOURINARY: No dysuria, frequency, hematuria, or incontinence  NEUROLOGICAL: No headaches, memory loss, loss of strength, numbness, or tremors      RADIOLOGY & ADDITIONAL TESTS:    Consultant(s) Notes Reviewed:  [x ] YES  [ ] NO    PHYSICAL EXAM:  GENERAL: NAD, well-groomed, well-developed,not in any distress ,  HEAD:  Atraumatic, Normocephalic  EYES: EOMI, PERRLA, conjunctiva and sclera clear  NECK: Supple, No JVD, Normal thyroid  NERVOUS SYSTEM:  Alert & Oriented X3, No focal deficit   CHEST/LUNG: Good air entry bilateral with no  rales, rhonchi, wheezing, or rubs  HEART: Regular rate and rhythm; No murmurs, rubs, or gallops  ABDOMEN: Soft, Nontender, Nondistended; Bowel sounds present  EXTREMITIES:  2+ Peripheral Pulses, No clubbing, cyanosis, or edema  SKIN: No rashes or lesions    Care Discussed with Consultants/Other Providers [ x] YES  [ ] NO

## 2018-10-24 NOTE — PROGRESS NOTE ADULT - SUBJECTIVE AND OBJECTIVE BOX
Patient with no anginal chest pain or shortness of breath      MEDICATIONS  (STANDING):  aspirin enteric coated 325 milliGRAM(s) Oral daily  atorvastatin 80 milliGRAM(s) Oral at bedtime  clopidogrel Tablet 75 milliGRAM(s) Oral daily  dextrose 50% Injectable 50 milliLiter(s) IV Push every 15 minutes  dextrose 50% Injectable 25 milliLiter(s) IV Push every 15 minutes  docusate sodium 100 milliGRAM(s) Oral three times a day  enoxaparin Injectable 40 milliGRAM(s) SubCutaneous daily  insulin glargine Injectable (LANTUS) 20 Unit(s) SubCutaneous at bedtime  insulin Infusion 1 Unit(s)/Hr (1 mL/Hr) IV Continuous <Continuous>  insulin lispro Injectable (HumaLOG) 8 Unit(s) SubCutaneous before breakfast  insulin lispro Injectable (HumaLOG) 8 Unit(s) SubCutaneous before lunch  insulin lispro Injectable (HumaLOG) 8 Unit(s) SubCutaneous before dinner  insulin regular  human corrective regimen sliding scale   SubCutaneous Before meals and at bedtime  metoclopramide Injectable 10 milliGRAM(s) IV Push every 8 hours  metoprolol tartrate 25 milliGRAM(s) Oral every 12 hours  pantoprazole    Tablet 40 milliGRAM(s) Oral before breakfast  sodium chloride 0.9%. 1000 milliLiter(s) (10 mL/Hr) IV Continuous <Continuous>    MEDICATIONS  (PRN):  oxyCODONE    5 mG/acetaminophen 325 mG 2 Tablet(s) Oral every 6 hours PRN Severe Pain (7 - 10)  oxyCODONE    5 mG/acetaminophen 325 mG 1 Tablet(s) Oral every 6 hours PRN Moderate Pain (4 - 6)      LABS:                        9.8    9.4   )-----------( 162      ( 24 Oct 2018 02:01 )             28.9     Hemoglobin: 9.8 g/dL (10-24 @ 02:01)  Hemoglobin: 12.3 g/dL (10-23 @ 02:05)  Hemoglobin: 11.7 g/dL (10-22 @ 19:11)  Hemoglobin: 14.8 g/dL (10-22 @ 06:33)  Hemoglobin: 14.8 g/dL (10-21 @ 06:56)    10-24    138  |  105  |  8   ----------------------------<  137<H>  4.4   |  21<L>  |  0.83    Ca    8.9      24 Oct 2018 02:01  Phos  3.3     10-22  Mg     2.8     10-22    TPro  6.3  /  Alb  4.1  /  TBili  1.0  /  DBili  x   /  AST  44<H>  /  ALT  59<H>  /  AlkPhos  36<L>  10-24    Creatinine Trend: 0.83<--, 0.88<--, 0.78<--, 0.83<--, 0.91<--, 0.96<--     CARDIAC MARKERS ( 22 Oct 2018 19:13 )  x     / x     / 234 U/L / x     / 16.8 ng/mL        PHYSICAL EXAM  Vital Signs Last 24 Hrs  T(C): 36.3 (24 Oct 2018 07:00), Max: 36.7 (23 Oct 2018 19:00)  T(F): 97.3 (24 Oct 2018 07:00), Max: 98.1 (23 Oct 2018 19:00)  HR: 88 (24 Oct 2018 09:00) (84 - 101)  BP: --  BP(mean): --  RR: 20 (24 Oct 2018 09:00) (15 - 28)  SpO2: 97% (24 Oct 2018 09:00) (88% - 98%)    Appearance: Normal	  HEENT:   Normal oral mucosa, PERRL, EOMI	  Lymphatic: No lymphadenopathy , no edema  Cardiovascular: Normal S1 S2, No JVD, No murmurs , Peripheral pulses palpable 2+ bilaterally  Respiratory: Lungs clear to auscultation, normal effort 	  Gastrointestinal:  Soft, Non-tender, + BS	      TELEMETRY: SR	      DIAGNOSTIC TESTING:  [ ] Echocardiogram: < from: Transthoracic Echocardiogram (10.18.18 @ 12:46) >  Conclusions:  1. Normal left ventricular internal dimensions and wall  thicknesses.  2. Mild global left ventricular systolic dysfunction with  mild segmental abnormalities.  Mild hypokinesis of the base  to mid anteroseptal, septal walls.  3. Normal diastolic function  4. Normal right ventricular size and function.  *** No previous Echo exam.    < end of copied text >    [ ]  Catheterization:  -Cath with severe stenosis in OM1, LAD (which was functionally significant by IFR of 0.89) and RCA    [ ] Stress Test:  < from: Nuclear Stress Test-Exercise (10.17.18 @ 09:43) >  IMPRESSIONS:Abnormal Study  * Myocardial Perfusion SPECT results are abnormal at 90 %  of MPHR.  * There are medium sized, moderate defects in inferior and  inferoseptal walls that are reversible, suggestive of  ischemia.  * Post-stress gated wall motion analysis was performed  (LVEF = 52 %;LVEDV = 62 ml.), revealing mild hypokinesis  of the inferior wall. RV function appeared normal.  RV  function appeared normal.  *** No previous Nuclear/Stress exam.    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  s/p C3L , ASD closure      - extubated and out of bed and off pressors   - on asa, statin plavix   - glycemic control   - chest tube in place per CTS  - pain management  - HD stable  - care per CTS

## 2018-10-24 NOTE — PROGRESS NOTE ADULT - SUBJECTIVE AND OBJECTIVE BOX
EP ATTENDING    tele: NSR, no events    no palpitations, no syncope, no angina    aspirin enteric coated 325 milliGRAM(s) Oral daily  atorvastatin 80 milliGRAM(s) Oral at bedtime  clopidogrel Tablet 75 milliGRAM(s) Oral daily  dextrose 50% Injectable 50 milliLiter(s) IV Push every 15 minutes  dextrose 50% Injectable 25 milliLiter(s) IV Push every 15 minutes  docusate sodium 100 milliGRAM(s) Oral three times a day  enoxaparin Injectable 40 milliGRAM(s) SubCutaneous daily  insulin glargine Injectable (LANTUS) 20 Unit(s) SubCutaneous at bedtime  insulin Infusion 1 Unit(s)/Hr IV Continuous <Continuous>  insulin lispro Injectable (HumaLOG) 8 Unit(s) SubCutaneous before breakfast  insulin lispro Injectable (HumaLOG) 8 Unit(s) SubCutaneous before lunch  insulin lispro Injectable (HumaLOG) 8 Unit(s) SubCutaneous before dinner  insulin regular  human corrective regimen sliding scale   SubCutaneous Before meals and at bedtime  metoclopramide Injectable 10 milliGRAM(s) IV Push every 8 hours  metoprolol tartrate 25 milliGRAM(s) Oral every 12 hours  oxyCODONE    5 mG/acetaminophen 325 mG 2 Tablet(s) Oral every 6 hours PRN  oxyCODONE    5 mG/acetaminophen 325 mG 1 Tablet(s) Oral every 6 hours PRN  pantoprazole    Tablet 40 milliGRAM(s) Oral before breakfast  sodium chloride 0.9%. 1000 milliLiter(s) IV Continuous <Continuous>                            9.8    9.4   )-----------( 162      ( 24 Oct 2018 02:01 )             28.9       10-24    138  |  105  |  8   ----------------------------<  137<H>  4.4   |  21<L>  |  0.83    Ca    8.9      24 Oct 2018 02:01  Phos  3.3     10-22  Mg     2.8     10-22    TPro  6.3  /  Alb  4.1  /  TBili  1.0  /  DBili  x   /  AST  44<H>  /  ALT  59<H>  /  AlkPhos  36<L>  10-24      CARDIAC MARKERS ( 22 Oct 2018 19:13 )  x     / x     / 234 U/L / x     / 16.8 ng/mL        T(C): 36.3 (10-24-18 @ 07:00), Max: 36.7 (10-23-18 @ 19:00)  HR: 88 (10-24-18 @ 09:00) (84 - 101)  BP: --  RR: 20 (10-24-18 @ 09:00) (15 - 28)  SpO2: 97% (10-24-18 @ 09:00) (88% - 98%)  Wt(kg): --    no JVD  RRR, no murmurs  CTAB  soft nt/nd  no c/c/e    cath: TVD  echo: LVEF 48%    A/P) 59 y/o male PMH DM admitted with subjective palpitations in setting of chest pain. Found to have TVD requiring CABG. EP called for palpitations in setting of underlying TVD, but EKG/tele have all been sinus. Now s/p CABG and ASD closure doing well    -if tele remains unremarkable post-op will recommend outpatient event monitoring  -will cont to monitor tele to r/o ventricular arrythmias given palpitations  -final EP reccs pending post-op course  -supportive care as per CTICU

## 2018-10-25 LAB
ANION GAP SERPL CALC-SCNC: 15 MMOL/L — SIGNIFICANT CHANGE UP (ref 5–17)
BUN SERPL-MCNC: 9 MG/DL — SIGNIFICANT CHANGE UP (ref 7–23)
CALCIUM SERPL-MCNC: 9.4 MG/DL — SIGNIFICANT CHANGE UP (ref 8.4–10.5)
CHLORIDE SERPL-SCNC: 101 MMOL/L — SIGNIFICANT CHANGE UP (ref 96–108)
CO2 SERPL-SCNC: 22 MMOL/L — SIGNIFICANT CHANGE UP (ref 22–31)
CREAT SERPL-MCNC: 0.89 MG/DL — SIGNIFICANT CHANGE UP (ref 0.5–1.3)
GLUCOSE SERPL-MCNC: 163 MG/DL — HIGH (ref 70–99)
HCT VFR BLD CALC: 32.8 % — LOW (ref 39–50)
HGB BLD-MCNC: 10.8 G/DL — LOW (ref 13–17)
MCHC RBC-ENTMCNC: 30.6 PG — SIGNIFICANT CHANGE UP (ref 27–34)
MCHC RBC-ENTMCNC: 33 GM/DL — SIGNIFICANT CHANGE UP (ref 32–36)
MCV RBC AUTO: 92.7 FL — SIGNIFICANT CHANGE UP (ref 80–100)
PLATELET # BLD AUTO: 232 K/UL — SIGNIFICANT CHANGE UP (ref 150–400)
POTASSIUM SERPL-MCNC: 4 MMOL/L — SIGNIFICANT CHANGE UP (ref 3.5–5.3)
POTASSIUM SERPL-SCNC: 4 MMOL/L — SIGNIFICANT CHANGE UP (ref 3.5–5.3)
RBC # BLD: 3.54 M/UL — LOW (ref 4.2–5.8)
RBC # FLD: 12.7 % — SIGNIFICANT CHANGE UP (ref 10.3–14.5)
SODIUM SERPL-SCNC: 138 MMOL/L — SIGNIFICANT CHANGE UP (ref 135–145)
WBC # BLD: 9.2 K/UL — SIGNIFICANT CHANGE UP (ref 3.8–10.5)
WBC # FLD AUTO: 9.2 K/UL — SIGNIFICANT CHANGE UP (ref 3.8–10.5)

## 2018-10-25 PROCEDURE — 71045 X-RAY EXAM CHEST 1 VIEW: CPT | Mod: 26

## 2018-10-25 RX ORDER — METOPROLOL TARTRATE 50 MG
50 TABLET ORAL EVERY 8 HOURS
Qty: 0 | Refills: 0 | Status: DISCONTINUED | OUTPATIENT
Start: 2018-10-25 | End: 2018-10-26

## 2018-10-25 RX ORDER — INSULIN GLARGINE 100 [IU]/ML
24 INJECTION, SOLUTION SUBCUTANEOUS AT BEDTIME
Qty: 0 | Refills: 0 | Status: DISCONTINUED | OUTPATIENT
Start: 2018-10-25 | End: 2018-10-27

## 2018-10-25 RX ORDER — BUDESONIDE, MICRONIZED 100 %
0.25 POWDER (GRAM) MISCELLANEOUS EVERY 12 HOURS
Qty: 0 | Refills: 0 | Status: DISCONTINUED | OUTPATIENT
Start: 2018-10-25 | End: 2018-10-27

## 2018-10-25 RX ADMIN — CLOPIDOGREL BISULFATE 75 MILLIGRAM(S): 75 TABLET, FILM COATED ORAL at 12:26

## 2018-10-25 RX ADMIN — HYDROMORPHONE HYDROCHLORIDE 2 MILLIGRAM(S): 2 INJECTION INTRAMUSCULAR; INTRAVENOUS; SUBCUTANEOUS at 05:58

## 2018-10-25 RX ADMIN — Medication 8 UNIT(S): at 12:22

## 2018-10-25 RX ADMIN — INSULIN HUMAN 2: 100 INJECTION, SOLUTION SUBCUTANEOUS at 12:23

## 2018-10-25 RX ADMIN — Medication 8 UNIT(S): at 08:34

## 2018-10-25 RX ADMIN — INSULIN HUMAN 2: 100 INJECTION, SOLUTION SUBCUTANEOUS at 08:34

## 2018-10-25 RX ADMIN — Medication 50 MILLIGRAM(S): at 21:59

## 2018-10-25 RX ADMIN — Medication 325 MILLIGRAM(S): at 12:25

## 2018-10-25 RX ADMIN — Medication 50 MILLIGRAM(S): at 14:43

## 2018-10-25 RX ADMIN — Medication 100 MILLIGRAM(S): at 05:28

## 2018-10-25 RX ADMIN — HYDROMORPHONE HYDROCHLORIDE 2 MILLIGRAM(S): 2 INJECTION INTRAMUSCULAR; INTRAVENOUS; SUBCUTANEOUS at 00:20

## 2018-10-25 RX ADMIN — HYDROMORPHONE HYDROCHLORIDE 2 MILLIGRAM(S): 2 INJECTION INTRAMUSCULAR; INTRAVENOUS; SUBCUTANEOUS at 00:50

## 2018-10-25 RX ADMIN — ENOXAPARIN SODIUM 40 MILLIGRAM(S): 100 INJECTION SUBCUTANEOUS at 12:25

## 2018-10-25 RX ADMIN — PANTOPRAZOLE SODIUM 40 MILLIGRAM(S): 20 TABLET, DELAYED RELEASE ORAL at 05:28

## 2018-10-25 RX ADMIN — Medication 100 MILLIGRAM(S): at 14:41

## 2018-10-25 RX ADMIN — Medication 0.25 MILLIGRAM(S): at 11:51

## 2018-10-25 RX ADMIN — Medication 8 UNIT(S): at 17:01

## 2018-10-25 RX ADMIN — Medication 25 MILLIGRAM(S): at 05:28

## 2018-10-25 RX ADMIN — INSULIN GLARGINE 24 UNIT(S): 100 INJECTION, SOLUTION SUBCUTANEOUS at 21:59

## 2018-10-25 RX ADMIN — HYDROMORPHONE HYDROCHLORIDE 2 MILLIGRAM(S): 2 INJECTION INTRAMUSCULAR; INTRAVENOUS; SUBCUTANEOUS at 05:28

## 2018-10-25 RX ADMIN — ATORVASTATIN CALCIUM 80 MILLIGRAM(S): 80 TABLET, FILM COATED ORAL at 21:59

## 2018-10-25 NOTE — PROGRESS NOTE ADULT - ASSESSMENT
58 year old male current smoker with family history of CAD with DM-II who presented to [facility] ED 10/17 complaining of chest pain/pressure with associated left arm numbness/weakness.  Denies dizziness, syncope, edema, orthopnea and PND.  CT head was performed showing no CVA and neurology was consulted who thought his symptoms were secondary to neuropathy.  Underwent a Cardiac work up, R/O MI, underwent Nuclear stress test revealing EF 52%, medium sized, moderate defects in inferior and inferoseptal walls that are reversible, suggestive of ischemia.   In light of patients cardiac risk factors, symptoms and abnormal noninvasive test findings there is high suspicion for CAD. Still have left shoulder pain with numbness left hand thumb and index finger.        Problem/Recommendation - 1:  Problem: Chest pain with Multivessel CAD . Recommendation: S/P LIMA-LAD, SVG-OM1, SVG-Diag, ASD closure .Doing very well. On Aspirin, Plavix Statin  .  CTS and Cardiology helping.      Problem/Recommendation - 2:  ·  Problem: left Shoulder and arm pain with Numbness and tingling in left hand.  Recommendation: CT Head noted . Neurology help appreciated and outpt follow up. X Ray noted and tendinosis. PRN Pain meds.     Problem/Recommendation - 3:  ·  Problem: Diabetes mellitus type 2 in nonobese.  Recommendation: Sugars in good range . Holding PO meds . Lantus and SSI .     Problem/Recommendation - 4:  ·  Problem: HLD (hyperlipidemia).  Recommendation: Statin .    Disposition : DC planning per CTS.

## 2018-10-25 NOTE — PROGRESS NOTE ADULT - PROBLEM SELECTOR PLAN 1
C3L POD#2  ASA, statin, metoprolol 50mg tID as tolerated per dr. burroughs  mobilize OOB, PT consulted  pulm toileting with incentive spirometer  wound care and assessment  pain control  High flow nasal cannula, wean supplemental oxygenation as tolerated

## 2018-10-25 NOTE — DIETITIAN INITIAL EVALUATION ADULT. - ADHERENCE
Pt noted with T2DM, follows unrestricted diet. Takes Glimepiride daily. Current HbA1c: 9% suggesting poor glycemic control.

## 2018-10-25 NOTE — PROGRESS NOTE ADULT - SUBJECTIVE AND OBJECTIVE BOX
Endocrinology Attending Covering for Dr. Day      Chief complaint  Patient is a 58y old  Male who presents with a chief complaint of Cardiac Surgery evaluation (25 Oct 2018 01:36)   Review of systems  Patient in bed, looks comfortable, no fever,  had no hypoglycemia.    Labs and Fingersticks  CAPILLARY BLOOD GLUCOSE      POCT Blood Glucose.: 180 mg/dL (25 Oct 2018 07:51)  POCT Blood Glucose.: 161 mg/dL (24 Oct 2018 21:50)  POCT Blood Glucose.: 112 mg/dL (24 Oct 2018 16:37)  POCT Blood Glucose.: 170 mg/dL (24 Oct 2018 12:32)      Anion Gap, Serum: 15 (10-25 @ 05:26)  Anion Gap, Serum: 12 (10-24 @ 02:01)      Calcium, Total Serum: 9.4 (10-25 @ 05:26)  Calcium, Total Serum: 8.9 (10-24 @ 02:01)  Albumin, Serum: 4.1 (10-24 @ 02:01)    Alanine Aminotransferase (ALT/SGPT): 59 <H> (10-24 @ 02:01)  Alkaline Phosphatase, Serum: 36 <L> (10-24 @ 02:01)  Aspartate Aminotransferase (AST/SGOT): 44 <H> (10-24 @ 02:01)        10-25    138  |  101  |  9   ----------------------------<  163<H>  4.0   |  22  |  0.89    Ca    9.4      25 Oct 2018 05:26    TPro  6.3  /  Alb  4.1  /  TBili  1.0  /  DBili  x   /  AST  44<H>  /  ALT  59<H>  /  AlkPhos  36<L>  10-24                        10.8   9.2   )-----------( 232      ( 25 Oct 2018 05:27 )             32.8     Medications  MEDICATIONS  (STANDING):  aspirin enteric coated 325 milliGRAM(s) Oral daily  atorvastatin 80 milliGRAM(s) Oral at bedtime  buDESOnide   0.25 milliGRAM(s) Respule 0.25 milliGRAM(s) Inhalation every 12 hours  clopidogrel Tablet 75 milliGRAM(s) Oral daily  dextrose 50% Injectable 50 milliLiter(s) IV Push every 15 minutes  dextrose 50% Injectable 25 milliLiter(s) IV Push every 15 minutes  docusate sodium 100 milliGRAM(s) Oral three times a day  enoxaparin Injectable 40 milliGRAM(s) SubCutaneous daily  insulin glargine Injectable (LANTUS) 24 Unit(s) SubCutaneous at bedtime  insulin Infusion 1 Unit(s)/Hr (1 mL/Hr) IV Continuous <Continuous>  insulin lispro Injectable (HumaLOG) 8 Unit(s) SubCutaneous before breakfast  insulin lispro Injectable (HumaLOG) 8 Unit(s) SubCutaneous before lunch  insulin lispro Injectable (HumaLOG) 8 Unit(s) SubCutaneous before dinner  insulin regular  human corrective regimen sliding scale   SubCutaneous Before meals and at bedtime  metoprolol tartrate 50 milliGRAM(s) Oral every 8 hours  pantoprazole    Tablet 40 milliGRAM(s) Oral before breakfast  sodium chloride 0.9%. 1000 milliLiter(s) (10 mL/Hr) IV Continuous <Continuous>      Physical Exam  General: Patient comfortable in bed  Vital Signs Last 12 Hrs  T(F): 98.5 (10-25-18 @ 07:06), Max: 98.6 (10-25-18 @ 03:00)  HR: 97 (10-25-18 @ 08:50) (91 - 97)  BP: 98/58 (10-25-18 @ 08:50) (98/58 - 108/60)  BP(mean): 73 (10-25-18 @ 07:06) (73 - 73)  RR: 18 (10-25-18 @ 08:50) (18 - 18)  SpO2: 94% (10-25-18 @ 08:50) (92% - 94%)  Neck: No palpable thyroid nodules.  CVS: S1S2, No murmurs  Respiratory: No wheezing, no crepitations  GI: Abdomen soft, bowel sounds positive  Musculoskeletal:  edema lower extremities.   Skin: No skin rashes, no ecchymosis    Diagnostics

## 2018-10-25 NOTE — PROGRESS NOTE ADULT - ASSESSMENT
Assessment/Plan  DMT2: 58y Male with DM T2  day 3 post  surgery, of Iv insulin drip  Bs yesterday : -[phone]  and 180 this AM  Please Go up on Lantus to 24 units Qhs, and Humalog 8 units pre-each meal.  CAD: day 2 post-op on medications, stable, monitored.  HTN: Controlled, On med.  HLD: On statin      Case discussed with the NP  Dr Alford [phone]

## 2018-10-25 NOTE — DIETITIAN INITIAL EVALUATION ADULT. - OTHER INFO
Patient seen for Length Of Stay on [facility]. Reports UBW as ~ 160lbs, reports weight has been relatively stable. Admit weight noted as 159lbs, which with consistent with stated UBW. Weight today (10/25) is 169lbs, weight changes in-house likely related to fluid shifts s/p surgery. Pt reports overall good PO intake and appetite during this admission however reports slight decrease in appetite earlier today due to constipation. Reports having BM this afternoon (after prunes) and is feeling better. Pt encouraged to consume adequate fluids and fiber to help with constipation and promote regular BMs.  RD offered to obtain food preferences but pt declined. Pt declined verbal diet education at this time, but accepted written materials.

## 2018-10-25 NOTE — DIETITIAN INITIAL EVALUATION ADULT. - PROBLEM SELECTOR PLAN 1
Admit to [facility] Tele[facility]   Morphine 2 mg IV x 1   Pre op cardiac surgery work up   Start Heparin gtt for ACS 6 hours post sheath removal   TTE   carotid Duplex study   Activity as tolerated   PFT's   CXR PA/ LA   Continue with ASA 81 mg PO daily   Continue Lopressor 12.5 mg PO BID  Glycemic Control   Plan for cardiac surgery with Dr. Baldwin this admission

## 2018-10-25 NOTE — PROGRESS NOTE ADULT - SUBJECTIVE AND OBJECTIVE BOX
Patient with no anginal chest pain or shortness of breath      acetaminophen  IVPB .. 1000 milliGRAM(s) IV Intermittent once PRN  aspirin enteric coated 325 milliGRAM(s) Oral daily  atorvastatin 80 milliGRAM(s) Oral at bedtime  clopidogrel Tablet 75 milliGRAM(s) Oral daily  dextrose 50% Injectable 50 milliLiter(s) IV Push every 15 minutes  dextrose 50% Injectable 25 milliLiter(s) IV Push every 15 minutes  docusate sodium 100 milliGRAM(s) Oral three times a day  enoxaparin Injectable 40 milliGRAM(s) SubCutaneous daily  HYDROmorphone   Tablet 2 milliGRAM(s) Oral every 3 hours PRN  HYDROmorphone   Tablet 4 milliGRAM(s) Oral every 3 hours PRN  insulin glargine Injectable (LANTUS) 20 Unit(s) SubCutaneous at bedtime  insulin Infusion 1 Unit(s)/Hr IV Continuous <Continuous>  insulin lispro Injectable (HumaLOG) 8 Unit(s) SubCutaneous before breakfast  insulin lispro Injectable (HumaLOG) 8 Unit(s) SubCutaneous before lunch  insulin lispro Injectable (HumaLOG) 8 Unit(s) SubCutaneous before dinner  insulin regular  human corrective regimen sliding scale   SubCutaneous Before meals and at bedtime  metoprolol tartrate 25 milliGRAM(s) Oral every 12 hours  pantoprazole    Tablet 40 milliGRAM(s) Oral before breakfast  sodium chloride 0.9%. 1000 milliLiter(s) IV Continuous <Continuous>                            9.8    9.4   )-----------( 162      ( 24 Oct 2018 02:01 )             28.9       Hemoglobin: 9.8 g/dL (10-24 @ 02:01)  Hemoglobin: 12.3 g/dL (10-23 @ 02:05)  Hemoglobin: 11.7 g/dL (10-22 @ 19:11)  Hemoglobin: 14.8 g/dL (10-22 @ 06:33)  Hemoglobin: 14.8 g/dL (10-21 @ 06:56)      10-24    138  |  105  |  8   ----------------------------<  137<H>  4.4   |  21<L>  |  0.83    Ca    8.9      24 Oct 2018 02:01    TPro  6.3  /  Alb  4.1  /  TBili  1.0  /  DBili  x   /  AST  44<H>  /  ALT  59<H>  /  AlkPhos  36<L>  10-24    Creatinine Trend: 0.83<--, 0.88<--, 0.78<--, 0.83<--, 0.91<--, 0.96<--    COAGS:     CARDIAC MARKERS ( 22 Oct 2018 19:13 )  x     / x     / 234 U/L / x     / 16.8 ng/mL        T(C): 36.9 (10-24-18 @ 23:00), Max: 36.9 (10-24-18 @ 19:07)  HR: 92 (10-24-18 @ 23:00) (84 - 94)  BP: 102/63 (10-24-18 @ 23:00) (87/52 - 130/65)  RR: 18 (10-24-18 @ 23:00) (17 - 24)  SpO2: 94% (10-24-18 @ 23:00) (91% - 97%)  Wt(kg): --    I&O's Summary    23 Oct 2018 07:01  -  24 Oct 2018 07:00  --------------------------------------------------------  IN: 1535.9 mL / OUT: 1560 mL / NET: -24.1 mL    24 Oct 2018 07:01  -  25 Oct 2018 01:36  --------------------------------------------------------  IN: 590 mL / OUT: 950 mL / NET: -360 mL      Appearance: Normal	  HEENT:   Normal oral mucosa, PERRL, EOMI	  Lymphatic: No lymphadenopathy , no edema  Cardiovascular: Normal S1 S2, No JVD, No murmurs , Peripheral pulses palpable 2+ bilaterally  Respiratory: Lungs clear to auscultation, normal effort 	  Gastrointestinal:  Soft, Non-tender, + BS	      TELEMETRY: SR	      DIAGNOSTIC TESTING:  [ ] Echocardiogram: < from: Transthoracic Echocardiogram (10.18.18 @ 12:46) >  Conclusions:  1. Normal left ventricular internal dimensions and wall  thicknesses.  2. Mild global left ventricular systolic dysfunction with  mild segmental abnormalities.  Mild hypokinesis of the base  to mid anteroseptal, septal walls.  3. Normal diastolic function  4. Normal right ventricular size and function.  *** No previous Echo exam.    < end of copied text >    [ ]  Catheterization:  -Cath with severe stenosis in OM1, LAD (which was functionally significant by IFR of 0.89) and RCA    [ ] Stress Test:  < from: Nuclear Stress Test-Exercise (10.17.18 @ 09:43) >  IMPRESSIONS:Abnormal Study  * Myocardial Perfusion SPECT results are abnormal at 90 %  of MPHR.  * There are medium sized, moderate defects in inferior and  inferoseptal walls that are reversible, suggestive of  ischemia.  * Post-stress gated wall motion analysis was performed  (LVEF = 52 %;LVEDV = 62 ml.), revealing mild hypokinesis  of the inferior wall. RV function appeared normal.  RV  function appeared normal.  *** No previous Nuclear/Stress exam.    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  s/p C3L , ASD closure      - tele stable.   - on asa, statin plavix   - glycemic control   - tolerating BB, increase as alisha and BP allows   - pain management  - HD stable  - care per CTS  D/W Dr Gibson Patient with no anginal chest pain or shortness of breath      acetaminophen  IVPB .. 1000 milliGRAM(s) IV Intermittent once PRN  aspirin enteric coated 325 milliGRAM(s) Oral daily  atorvastatin 80 milliGRAM(s) Oral at bedtime  clopidogrel Tablet 75 milliGRAM(s) Oral daily  dextrose 50% Injectable 50 milliLiter(s) IV Push every 15 minutes  dextrose 50% Injectable 25 milliLiter(s) IV Push every 15 minutes  docusate sodium 100 milliGRAM(s) Oral three times a day  enoxaparin Injectable 40 milliGRAM(s) SubCutaneous daily  HYDROmorphone   Tablet 2 milliGRAM(s) Oral every 3 hours PRN  HYDROmorphone   Tablet 4 milliGRAM(s) Oral every 3 hours PRN  insulin glargine Injectable (LANTUS) 20 Unit(s) SubCutaneous at bedtime  insulin Infusion 1 Unit(s)/Hr IV Continuous <Continuous>  insulin lispro Injectable (HumaLOG) 8 Unit(s) SubCutaneous before breakfast  insulin lispro Injectable (HumaLOG) 8 Unit(s) SubCutaneous before lunch  insulin lispro Injectable (HumaLOG) 8 Unit(s) SubCutaneous before dinner  insulin regular  human corrective regimen sliding scale   SubCutaneous Before meals and at bedtime  metoprolol tartrate 25 milliGRAM(s) Oral every 12 hours  pantoprazole    Tablet 40 milliGRAM(s) Oral before breakfast  sodium chloride 0.9%. 1000 milliLiter(s) IV Continuous <Continuous>                            9.8    9.4   )-----------( 162      ( 24 Oct 2018 02:01 )             28.9       Hemoglobin: 9.8 g/dL (10-24 @ 02:01)  Hemoglobin: 12.3 g/dL (10-23 @ 02:05)  Hemoglobin: 11.7 g/dL (10-22 @ 19:11)  Hemoglobin: 14.8 g/dL (10-22 @ 06:33)  Hemoglobin: 14.8 g/dL (10-21 @ 06:56)      10-24    138  |  105  |  8   ----------------------------<  137<H>  4.4   |  21<L>  |  0.83    Ca    8.9      24 Oct 2018 02:01    TPro  6.3  /  Alb  4.1  /  TBili  1.0  /  DBili  x   /  AST  44<H>  /  ALT  59<H>  /  AlkPhos  36<L>  10-24    Creatinine Trend: 0.83<--, 0.88<--, 0.78<--, 0.83<--, 0.91<--, 0.96<--    COAGS:     CARDIAC MARKERS ( 22 Oct 2018 19:13 )  x     / x     / 234 U/L / x     / 16.8 ng/mL        T(C): 36.9 (10-24-18 @ 23:00), Max: 36.9 (10-24-18 @ 19:07)  HR: 92 (10-24-18 @ 23:00) (84 - 94)  BP: 102/63 (10-24-18 @ 23:00) (87/52 - 130/65)  RR: 18 (10-24-18 @ 23:00) (17 - 24)  SpO2: 94% (10-24-18 @ 23:00) (91% - 97%)  Wt(kg): --    I&O's Summary    23 Oct 2018 07:01  -  24 Oct 2018 07:00  --------------------------------------------------------  IN: 1535.9 mL / OUT: 1560 mL / NET: -24.1 mL    24 Oct 2018 07:01  -  25 Oct 2018 01:36  --------------------------------------------------------  IN: 590 mL / OUT: 950 mL / NET: -360 mL      Appearance: Normal	  HEENT:   Normal oral mucosa, PERRL, EOMI	  Lymphatic: No lymphadenopathy , no edema  Cardiovascular: Normal S1 S2, No JVD, No murmurs , Peripheral pulses palpable 2+ bilaterally  Respiratory: Lungs clear to auscultation, normal effort 	  Gastrointestinal:  Soft, Non-tender, + BS	      TELEMETRY: SR	      DIAGNOSTIC TESTING:  [ ] Echocardiogram: < from: Transthoracic Echocardiogram (10.18.18 @ 12:46) >  Conclusions:  1. Normal left ventricular internal dimensions and wall  thicknesses.  2. Mild global left ventricular systolic dysfunction with  mild segmental abnormalities.  Mild hypokinesis of the base  to mid anteroseptal, septal walls.  3. Normal diastolic function  4. Normal right ventricular size and function.  *** No previous Echo exam.    < end of copied text >    [ ]  Catheterization:  -Cath with severe stenosis in OM1, LAD (which was functionally significant by IFR of 0.89) and RCA    [ ] Stress Test:  < from: Nuclear Stress Test-Exercise (10.17.18 @ 09:43) >  IMPRESSIONS:Abnormal Study  * Myocardial Perfusion SPECT results are abnormal at 90 %  of MPHR.  * There are medium sized, moderate defects in inferior and  inferoseptal walls that are reversible, suggestive of  ischemia.  * Post-stress gated wall motion analysis was performed  (LVEF = 52 %;LVEDV = 62 ml.), revealing mild hypokinesis  of the inferior wall. RV function appeared normal.  RV  function appeared normal.  *** No previous Nuclear/Stress exam.    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  s/p C3L , ASD closure      - tele stable.   - on asa, statin plavix   - glycemic control   - tolerating BB, increase as alisha and BP allows   - pain management  - HD stable  - care per CTS

## 2018-10-25 NOTE — DIETITIAN INITIAL EVALUATION ADULT. - ORAL INTAKE PTA
Pt reports good PO intake and appetite PTA, pt not interested in providing full diet recall. Avoids soda but drinks juice occasionally. Confirms NKFA, denies micronutrient supplementation. Pt denies chewing/swallowing difficulty, nausea, vomiting, diarrhea, constipation PTA./good

## 2018-10-25 NOTE — PROGRESS NOTE ADULT - SUBJECTIVE AND OBJECTIVE BOX
Patient with no anginal chest pain or shortness of breath  Transferred out of step down          MEDICATIONS  (STANDING):  aspirin enteric coated 325 milliGRAM(s) Oral daily  atorvastatin 80 milliGRAM(s) Oral at bedtime  buDESOnide   0.25 milliGRAM(s) Respule 0.25 milliGRAM(s) Inhalation every 12 hours  clopidogrel Tablet 75 milliGRAM(s) Oral daily  dextrose 50% Injectable 50 milliLiter(s) IV Push every 15 minutes  dextrose 50% Injectable 25 milliLiter(s) IV Push every 15 minutes  docusate sodium 100 milliGRAM(s) Oral three times a day  enoxaparin Injectable 40 milliGRAM(s) SubCutaneous daily  insulin glargine Injectable (LANTUS) 24 Unit(s) SubCutaneous at bedtime  insulin Infusion 1 Unit(s)/Hr (1 mL/Hr) IV Continuous <Continuous>  insulin lispro Injectable (HumaLOG) 8 Unit(s) SubCutaneous before breakfast  insulin lispro Injectable (HumaLOG) 8 Unit(s) SubCutaneous before lunch  insulin lispro Injectable (HumaLOG) 8 Unit(s) SubCutaneous before dinner  insulin regular  human corrective regimen sliding scale   SubCutaneous Before meals and at bedtime  metoprolol tartrate 50 milliGRAM(s) Oral every 8 hours  pantoprazole    Tablet 40 milliGRAM(s) Oral before breakfast  sodium chloride 0.9%. 1000 milliLiter(s) (10 mL/Hr) IV Continuous <Continuous>    MEDICATIONS  (PRN):  acetaminophen  IVPB .. 1000 milliGRAM(s) IV Intermittent once PRN Mild Pain (1 - 3)  HYDROmorphone   Tablet 2 milliGRAM(s) Oral every 3 hours PRN Moderate Pain (4 - 6)  HYDROmorphone   Tablet 4 milliGRAM(s) Oral every 3 hours PRN Severe Pain (7 - 10)      LABS:                        10.8   9.2   )-----------( 232      ( 25 Oct 2018 05:27 )             32.8     Hemoglobin: 10.8 g/dL (10-25 @ 05:27)  Hemoglobin: 9.8 g/dL (10-24 @ 02:01)  Hemoglobin: 12.3 g/dL (10-23 @ 02:05)  Hemoglobin: 11.7 g/dL (10-22 @ 19:11)  Hemoglobin: 14.8 g/dL (10-22 @ 06:33)    10-25    138  |  101  |  9   ----------------------------<  163<H>  4.0   |  22  |  0.89    Ca    9.4      25 Oct 2018 05:26    TPro  6.3  /  Alb  4.1  /  TBili  1.0  /  DBili  x   /  AST  44<H>  /  ALT  59<H>  /  AlkPhos  36<L>  10-24    Creatinine Trend: 0.89<--, 0.83<--, 0.88<--, 0.78<--, 0.83<--, 0.91<--     CARDIAC MARKERS ( 22 Oct 2018 19:13 )  x     / x     / 234 U/L / x     / 16.8 ng/mL        PHYSICAL EXAM  Vital Signs Last 24 Hrs  T(C): 37.1 (25 Oct 2018 13:40), Max: 37.1 (25 Oct 2018 13:40)  T(F): 98.7 (25 Oct 2018 13:40), Max: 98.7 (25 Oct 2018 13:40)  HR: 102 (25 Oct 2018 14:39) (87 - 102)  BP: 117/74 (25 Oct 2018 14:39) (87/52 - 119/70)  BP(mean): 87 (25 Oct 2018 11:45) (65 - 87)  RR: 18 (25 Oct 2018 14:39) (18 - 20)  SpO2: 92% (25 Oct 2018 14:39) (91% - 96%)      Appearance: Normal	  HEENT:   Normal oral mucosa, PERRL, EOMI	  Lymphatic: No lymphadenopathy , no edema  Cardiovascular: Normal S1 S2, No JVD, No murmurs , Peripheral pulses palpable 2+ bilaterally  Respiratory: Lungs clear to auscultation, normal effort 	  Gastrointestinal:  Soft, Non-tender, + BS	      TELEMETRY: SR	      DIAGNOSTIC TESTING:  [ ] Echocardiogram: < from: Transthoracic Echocardiogram (10.18.18 @ 12:46) >  Conclusions:  1. Normal left ventricular internal dimensions and wall  thicknesses.  2. Mild global left ventricular systolic dysfunction with  mild segmental abnormalities.  Mild hypokinesis of the base  to mid anteroseptal, septal walls.  3. Normal diastolic function  4. Normal right ventricular size and function.  *** No previous Echo exam.    < end of copied text >    [ ]  Catheterization:  -Cath with severe stenosis in OM1, LAD (which was functionally significant by IFR of 0.89) and RCA    [ ] Stress Test:  < from: Nuclear Stress Test-Exercise (10.17.18 @ 09:43) >  IMPRESSIONS:Abnormal Study  * Myocardial Perfusion SPECT results are abnormal at 90 %  of MPHR.  * There are medium sized, moderate defects in inferior and  inferoseptal walls that are reversible, suggestive of  ischemia.  * Post-stress gated wall motion analysis was performed  (LVEF = 52 %;LVEDV = 62 ml.), revealing mild hypokinesis  of the inferior wall. RV function appeared normal.  RV  function appeared normal.  *** No previous Nuclear/Stress exam.    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 s/p CABG x 3 (LIMA-LAD, SVG-OM, SVG-Diag)  , ASD closure on 10/22:     -continue with antiplatelet therapy for cad  -on dapt due to severely calcified cad  -continue with supportive care  -care per CTS

## 2018-10-25 NOTE — PROGRESS NOTE ADULT - ASSESSMENT
58 year old male current smoker with family history of CAD with DM-II who presented to [facility] ED 10/17 complaining of chest pain/pressure with associated left arm numbness/weakness.  Denies dizziness, syncope, edema, orthopnea and PND.  CT head was performed showing no CVA and neurology was consulted who thought his symptoms were secondary to neuropathy.  Underwent a Cardiac work up, R/O MI, underwent Nuclear stress test revealing EF 52%, medium sized, moderate defects in inferior and inferoseptal walls that are reversible, suggestive of ischemia.   In light of patients cardiac risk factors, symptoms and abnormal noninvasive test findings there is high suspicion for CAD. Still have left shoulder pain with numbness left hand thumb and index finger.       10/22: Atrial septal defect closure, CABGx3 (LIMA-LAD, SVG-OM, SVG-Diag)  10/23: care per [facility], weaning off vaso pressors, dobutamine gtt weaned off, remains on insulin gtt for glycemic control, endo consult appreciated  10/24: transferred to Stepdown, on high flow nasal cannula, remains hemodynamically stable, no arrhythmias NSR.     10/25/18 - BB increased to 50mg Lopressor q8h as per julianna emanuel removed w/o incident.  pt to transfer to floor   d/c planning

## 2018-10-25 NOTE — PROGRESS NOTE ADULT - SUBJECTIVE AND OBJECTIVE BOX
VITAL SIGNS-Tele: SR     Vital Signs Last 24 Hrs  T(C): 36.9 (10-25-18 @ 07:06), Max: 37 (10-25-18 @ 03:00)  HR: 97 (10-25-18 @ 08:50) (87 - 97)  BP: 98/58 (10-25-18 @ 08:50) (87/52 - 116/68)  SpO2: 94% (10-25-18 @ 08:50) (91% - 96%)         10-24 @ 07:01  -  10-25 @ 07:00  --------------------------------------------------------  IN: 650 mL / OUT: 1300 mL / NET: -650 mL    10-25 @ 07:01  -  10-25 @ 10:24  --------------------------------------------------------  IN: 360 mL / OUT: 0 mL / NET: 360 mL    Daily     Daily Weight in k.7 (25 Oct 2018 05:53)    CAPILLARY BLOOD GLUCOSE  POCT Blood Glucose.: 180 mg/dL (25 Oct 2018 07:51)  POCT Blood Glucose.: 161 mg/dL (24 Oct 2018 21:50)  POCT Blood Glucose.: 112 mg/dL (24 Oct 2018 16:37)  POCT Blood Glucose.: 170 mg/dL (24 Oct 2018 12:32)        Pacing Wires        [  ]   Settings:                                  Isolated  [x  ]        PHYSICAL EXAM:  Neurology: alert and oriented x 3, nonfocal, no gross deficits  CV :  Sternal Wound :  CDI , Stable  Lungs:  Abdomen: soft, nontender, nondistended, positive bowel sounds, last bowel movement         Extremities:         acetaminophen  IVPB .. 1000 milliGRAM(s) IV Intermittent once PRN  aspirin enteric coated 325 milliGRAM(s) Oral daily  atorvastatin 80 milliGRAM(s) Oral at bedtime  buDESOnide   0.25 milliGRAM(s) Respule 0.25 milliGRAM(s) Inhalation every 12 hours  clopidogrel Tablet 75 milliGRAM(s) Oral daily  docusate sodium 100 milliGRAM(s) Oral three times a day  enoxaparin Injectable 40 milliGRAM(s) SubCutaneous daily  HYDROmorphone   Tablet 2 milliGRAM(s) Oral every 3 hours PRN  HYDROmorphone   Tablet 4 milliGRAM(s) Oral every 3 hours PRN  insulin glargine Injectable (LANTUS) 24 Unit(s) SubCutaneous at bedtime  insulin Infusion 1 Unit(s)/Hr IV Continuous <Continuous>  insulin lispro Injectable (HumaLOG) 8 Unit(s) SubCutaneous before breakfast  insulin lispro Injectable (HumaLOG) 8 Unit(s) SubCutaneous before lunch  insulin lispro Injectable (HumaLOG) 8 Unit(s) SubCutaneous before dinner  insulin regular  human corrective regimen sliding scale   SubCutaneous Before meals and at bedtime  metoprolol tartrate 50 milliGRAM(s) Oral every 8 hours  pantoprazole    Tablet 40 milliGRAM(s) Oral before breakfast  sodium chloride 0.9%. 1000 milliLiter(s) IV Continuous <Continuous>    Physical Theray Rec:   Home  [  ]   Home w/ PT  [  ]  Rehab  [  ]  Discussed with Cardiothoracic Team at AM rounds. VITAL SIGNS-Tele: SR     Vital Signs Last 24 Hrs  T(C): 36.9 (10-25-18 @ 07:06), Max: 37 (10-25-18 @ 03:00)  HR: 97 (10-25-18 @ 08:50) (87 - 97)  BP: 98/58 (10-25-18 @ 08:50) (87/52 - 116/68)  SpO2: 94% (10-25-18 @ 08:50) (91% - 96%)         10-24 @ 07:01  -  10-25 @ 07:00  --------------------------------------------------------  IN: 650 mL / OUT: 1300 mL / NET: -650 mL    10-25 @ 07:01  -  10-25 @ 10:24  --------------------------------------------------------  IN: 360 mL / OUT: 0 mL / NET: 360 mL    Daily     Daily Weight in k.7 (25 Oct 2018 05:53)    CAPILLARY BLOOD GLUCOSE  POCT Blood Glucose.: 180 mg/dL (25 Oct 2018 07:51)  POCT Blood Glucose.: 161 mg/dL (24 Oct 2018 21:50)  POCT Blood Glucose.: 112 mg/dL (24 Oct 2018 16:37)  POCT Blood Glucose.: 170 mg/dL (24 Oct 2018 12:32)        Pacing Wires        [  ]   Settings:                                  Isolated  [x  ]        PHYSICAL EXAM:  Neurology: alert and oriented x 3, nonfocal, no gross deficits  CV : S1S2  Sternal Wound :  CDI , Stable  Lungs: cta  Abdomen: soft, nontender, nondistended, positive bowel sounds, last bowel movement         Extremities:     no edema, no calf tenderness    acetaminophen  IVPB .. 1000 milliGRAM(s) IV Intermittent once PRN  aspirin enteric coated 325 milliGRAM(s) Oral daily  atorvastatin 80 milliGRAM(s) Oral at bedtime  buDESOnide   0.25 milliGRAM(s) Respule 0.25 milliGRAM(s) Inhalation every 12 hours  clopidogrel Tablet 75 milliGRAM(s) Oral daily  docusate sodium 100 milliGRAM(s) Oral three times a day  enoxaparin Injectable 40 milliGRAM(s) SubCutaneous daily  HYDROmorphone   Tablet 2 milliGRAM(s) Oral every 3 hours PRN  HYDROmorphone   Tablet 4 milliGRAM(s) Oral every 3 hours PRN  insulin glargine Injectable (LANTUS) 24 Unit(s) SubCutaneous at bedtime  insulin Infusion 1 Unit(s)/Hr IV Continuous <Continuous>  insulin lispro Injectable (HumaLOG) 8 Unit(s) SubCutaneous before breakfast  insulin lispro Injectable (HumaLOG) 8 Unit(s) SubCutaneous before lunch  insulin lispro Injectable (HumaLOG) 8 Unit(s) SubCutaneous before dinner  insulin regular  human corrective regimen sliding scale   SubCutaneous Before meals and at bedtime  metoprolol tartrate 50 milliGRAM(s) Oral every 8 hours  pantoprazole    Tablet 40 milliGRAM(s) Oral before breakfast  sodium chloride 0.9%. 1000 milliLiter(s) IV Continuous <Continuous>    Physical Theray Rec:   Home  [  ]   Home w/ PT  [  ]  Rehab  [  ]  Discussed with Cardiothoracic Team at AM rounds.

## 2018-10-25 NOTE — PROGRESS NOTE ADULT - SUBJECTIVE AND OBJECTIVE BOX
Patient with no anginal chest pain or shortness of breath      acetaminophen  IVPB .. 1000 milliGRAM(s) IV Intermittent once PRN  aspirin enteric coated 325 milliGRAM(s) Oral daily  atorvastatin 80 milliGRAM(s) Oral at bedtime  buDESOnide   0.25 milliGRAM(s) Respule 0.25 milliGRAM(s) Inhalation every 12 hours  clopidogrel Tablet 75 milliGRAM(s) Oral daily  dextrose 50% Injectable 50 milliLiter(s) IV Push every 15 minutes  dextrose 50% Injectable 25 milliLiter(s) IV Push every 15 minutes  docusate sodium 100 milliGRAM(s) Oral three times a day  enoxaparin Injectable 40 milliGRAM(s) SubCutaneous daily  HYDROmorphone   Tablet 2 milliGRAM(s) Oral every 3 hours PRN  HYDROmorphone   Tablet 4 milliGRAM(s) Oral every 3 hours PRN  insulin glargine Injectable (LANTUS) 24 Unit(s) SubCutaneous at bedtime  insulin Infusion 1 Unit(s)/Hr IV Continuous <Continuous>  insulin lispro Injectable (HumaLOG) 8 Unit(s) SubCutaneous before breakfast  insulin lispro Injectable (HumaLOG) 8 Unit(s) SubCutaneous before lunch  insulin lispro Injectable (HumaLOG) 8 Unit(s) SubCutaneous before dinner  insulin regular  human corrective regimen sliding scale   SubCutaneous Before meals and at bedtime  metoprolol tartrate 50 milliGRAM(s) Oral every 8 hours  pantoprazole    Tablet 40 milliGRAM(s) Oral before breakfast  sodium chloride 0.9%. 1000 milliLiter(s) IV Continuous <Continuous>                            10.8   9.2   )-----------( 232      ( 25 Oct 2018 05:27 )             32.8       Hemoglobin: 10.8 g/dL (10-25 @ 05:27)  Hemoglobin: 9.8 g/dL (10-24 @ 02:01)  Hemoglobin: 12.3 g/dL (10-23 @ 02:05)  Hemoglobin: 11.7 g/dL (10-22 @ 19:11)  Hemoglobin: 14.8 g/dL (10-22 @ 06:33)      10-25    138  |  101  |  9   ----------------------------<  163<H>  4.0   |  22  |  0.89    Ca    9.4      25 Oct 2018 05:26    TPro  6.3  /  Alb  4.1  /  TBili  1.0  /  DBili  x   /  AST  44<H>  /  ALT  59<H>  /  AlkPhos  36<L>  10-24    Creatinine Trend: 0.89<--, 0.83<--, 0.88<--, 0.78<--, 0.83<--, 0.91<--    COAGS:     CARDIAC MARKERS ( 22 Oct 2018 19:13 )  x     / x     / 234 U/L / x     / 16.8 ng/mL        T(C): 37.1 (10-25-18 @ 13:40), Max: 37.1 (10-25-18 @ 13:40)  HR: 102 (10-25-18 @ 14:39) (90 - 102)  BP: 117/74 (10-25-18 @ 14:39) (87/52 - 119/70)  RR: 18 (10-25-18 @ 14:39) (18 - 20)  SpO2: 92% (10-25-18 @ 14:39) (91% - 95%)  Wt(kg): --    I&O's Summary    24 Oct 2018 07:01  -  25 Oct 2018 07:00  --------------------------------------------------------  IN: 650 mL / OUT: 1300 mL / NET: -650 mL    25 Oct 2018 07:01  -  25 Oct 2018 16:05  --------------------------------------------------------  IN: 600 mL / OUT: 0 mL / NET: 600 mL            Appearance: Normal	  HEENT:   Normal oral mucosa, PERRL, EOMI	  Lymphatic: No lymphadenopathy , no edema  Cardiovascular: Normal S1 S2, No JVD, No murmurs , Peripheral pulses palpable 2+ bilaterally  Respiratory: Lungs clear to auscultation, normal effort 	  Gastrointestinal:  Soft, Non-tender, + BS	      TELEMETRY: SR	      DIAGNOSTIC TESTING:  [ ] Echocardiogram: < from: Transthoracic Echocardiogram (10.18.18 @ 12:46) >  Conclusions:  1. Normal left ventricular internal dimensions and wall  thicknesses.  2. Mild global left ventricular systolic dysfunction with  mild segmental abnormalities.  Mild hypokinesis of the base  to mid anteroseptal, septal walls.  3. Normal diastolic function  4. Normal right ventricular size and function.  *** No previous Echo exam.    < end of copied text >    [ ]  Catheterization:  -Cath with severe stenosis in OM1, LAD (which was functionally significant by IFR of 0.89) and RCA    [ ] Stress Test:  < from: Nuclear Stress Test-Exercise (10.17.18 @ 09:43) >  IMPRESSIONS:Abnormal Study  * Myocardial Perfusion SPECT results are abnormal at 90 %  of MPHR.  * There are medium sized, moderate defects in inferior and  inferoseptal walls that are reversible, suggestive of  ischemia.  * Post-stress gated wall motion analysis was performed  (LVEF = 52 %;LVEDV = 62 ml.), revealing mild hypokinesis  of the inferior wall. RV function appeared normal.  RV  function appeared normal.  *** No previous Nuclear/Stress exam.    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  s/p C3L , ASD closure      -  progressing well  - cont Asa/plavix statin  - BB  - D/C planning per CTS    León Gibson MD, FACC

## 2018-10-25 NOTE — DIETITIAN INITIAL EVALUATION ADULT. - NS AS NUTRI INTERV ED CONTENT
Purpose of the nutrition education/Pt declined full verbal diet education. RD briefly reviewed with patient the importance pairing protein with carbohydrates during meals and limiting intake of concentrated sweets such as juice. Pt accepted written materials. Will follow up with diet education as able.

## 2018-10-25 NOTE — PROGRESS NOTE ADULT - SUBJECTIVE AND OBJECTIVE BOX
INTERVAL HPI/OVERNIGHT EVENTS: I feel fine   Vital Signs Last 24 Hrs  T(C): 37.1 (25 Oct 2018 13:40), Max: 37.1 (25 Oct 2018 13:40)  T(F): 98.7 (25 Oct 2018 13:40), Max: 98.7 (25 Oct 2018 13:40)  HR: 102 (25 Oct 2018 14:39) (90 - 102)  BP: 117/74 (25 Oct 2018 14:39) (87/52 - 119/70)  BP(mean): 87 (25 Oct 2018 11:45) (65 - 87)  RR: 18 (25 Oct 2018 14:39) (18 - 20)  SpO2: 92% (25 Oct 2018 14:39) (91% - 95%)  I&O's Summary    24 Oct 2018 07:01  -  25 Oct 2018 07:00  --------------------------------------------------------  IN: 650 mL / OUT: 1300 mL / NET: -650 mL    25 Oct 2018 07:01  -  25 Oct 2018 16:49  --------------------------------------------------------  IN: 600 mL / OUT: 0 mL / NET: 600 mL      MEDICATIONS  (STANDING):  aspirin enteric coated 325 milliGRAM(s) Oral daily  atorvastatin 80 milliGRAM(s) Oral at bedtime  buDESOnide   0.25 milliGRAM(s) Respule 0.25 milliGRAM(s) Inhalation every 12 hours  clopidogrel Tablet 75 milliGRAM(s) Oral daily  dextrose 50% Injectable 50 milliLiter(s) IV Push every 15 minutes  dextrose 50% Injectable 25 milliLiter(s) IV Push every 15 minutes  docusate sodium 100 milliGRAM(s) Oral three times a day  enoxaparin Injectable 40 milliGRAM(s) SubCutaneous daily  insulin glargine Injectable (LANTUS) 24 Unit(s) SubCutaneous at bedtime  insulin Infusion 1 Unit(s)/Hr (1 mL/Hr) IV Continuous <Continuous>  insulin lispro Injectable (HumaLOG) 8 Unit(s) SubCutaneous before breakfast  insulin lispro Injectable (HumaLOG) 8 Unit(s) SubCutaneous before lunch  insulin lispro Injectable (HumaLOG) 8 Unit(s) SubCutaneous before dinner  insulin regular  human corrective regimen sliding scale   SubCutaneous Before meals and at bedtime  metoprolol tartrate 50 milliGRAM(s) Oral every 8 hours  pantoprazole    Tablet 40 milliGRAM(s) Oral before breakfast  sodium chloride 0.9%. 1000 milliLiter(s) (10 mL/Hr) IV Continuous <Continuous>    MEDICATIONS  (PRN):  acetaminophen  IVPB .. 1000 milliGRAM(s) IV Intermittent once PRN Mild Pain (1 - 3)  HYDROmorphone   Tablet 2 milliGRAM(s) Oral every 3 hours PRN Moderate Pain (4 - 6)  HYDROmorphone   Tablet 4 milliGRAM(s) Oral every 3 hours PRN Severe Pain (7 - 10)    LABS:                        10.8   9.2   )-----------( 232      ( 25 Oct 2018 05:27 )             32.8     10-25    138  |  101  |  9   ----------------------------<  163<H>  4.0   |  22  |  0.89    Ca    9.4      25 Oct 2018 05:26    TPro  6.3  /  Alb  4.1  /  TBili  1.0  /  DBili  x   /  AST  44<H>  /  ALT  59<H>  /  AlkPhos  36<L>  10-24        CAPILLARY BLOOD GLUCOSE      POCT Blood Glucose.: 125 mg/dL (25 Oct 2018 16:31)  POCT Blood Glucose.: 180 mg/dL (25 Oct 2018 12:00)  POCT Blood Glucose.: 180 mg/dL (25 Oct 2018 07:51)  POCT Blood Glucose.: 161 mg/dL (24 Oct 2018 21:50)      ABG - ( 24 Oct 2018 09:13 )  pH, Arterial: 7.43  pH, Blood: x     /  pCO2: 34    /  pO2: 73    / HCO3: 22    / Base Excess: -1.7  /  SaO2: 95                  REVIEW OF SYSTEMS:  CONSTITUTIONAL: No fever, weight loss, or fatigue  EYES: No eye pain, visual disturbances, or discharge  ENMT:  No difficulty hearing, tinnitus, vertigo; No sinus or throat pain  NECK: No pain or stiffness  BREASTS: No pain, masses, or nipple discharge  RESPIRATORY: No cough, wheezing, chills or hemoptysis; No shortness of breath  CARDIOVASCULAR: No chest pain, palpitations, dizziness, or leg swelling  GASTROINTESTINAL: No abdominal or epigastric pain. No nausea, vomiting, or hematemesis; No diarrhea or constipation. No melena or hematochezia.  GENITOURINARY: No dysuria, frequency, hematuria, or incontinence  NEUROLOGICAL: No headaches, memory loss, loss of strength, numbness, or tremors    Consultant(s) Notes Reviewed:  [x ] YES  [ ] NO    PHYSICAL EXAM:  GENERAL: NAD, well-groomed, well-developed, not in any distress ,  HEAD:  Atraumatic, Normocephalic  EYES: EOMI, PERRLA, conjunctiva and sclera clear  ENMT: No tonsillar erythema, exudates, or enlargement; Moist mucous membranes, Good dentition, No lesions  NECK: Supple, No JVD, Normal thyroid  NERVOUS SYSTEM:  Alert & Oriented X3, No focal deficit   CHEST/LUNG: Good air entry bilateral with no  rales, rhonchi, wheezing, or rubs  HEART: Regular rate and rhythm; No murmurs, rubs, or gallops. sternal wound dressed   ABDOMEN: Soft, Nontender, Nondistended; Bowel sounds present  EXTREMITIES:  2+ Peripheral Pulses, No clubbing, cyanosis, or edema    Care Discussed with Consultants/Other Providers [ x] YES  [ ] NO

## 2018-10-25 NOTE — DIETITIAN INITIAL EVALUATION ADULT. - NS AS NUTRI INTERV MEALS SNACK
Continue current diet as tolerated. Encourage PO intake of protein-rich foods to help promote wound healing./General/healthful diet

## 2018-10-25 NOTE — DIETITIAN INITIAL EVALUATION ADULT. - ENERGY NEEDS
Ht: 66inches , Wt: 159lbs, BMI: 25.6kg/m2, IBW: 142lbs +/- 10%, %IBW: 112%  Edema: +2 left/right ankle per nursing flow sheets , Skin: free of pressure injuries, surgical incision noted (midsternal) per nursing flow sheets .    Pertinent information: This is a  58y Male with tobacco abuse, fhx of  CAD, T2DM admitted with unstable angina 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 s/p CABG x 3 (LIMA-LAD, SVG-OM, SVG-Diag), ASD closure on 10/22. Endocrine Following.

## 2018-10-26 LAB
ANION GAP SERPL CALC-SCNC: 12 MMOL/L — SIGNIFICANT CHANGE UP (ref 5–17)
BUN SERPL-MCNC: 11 MG/DL — SIGNIFICANT CHANGE UP (ref 7–23)
CALCIUM SERPL-MCNC: 8.7 MG/DL — SIGNIFICANT CHANGE UP (ref 8.4–10.5)
CHLORIDE SERPL-SCNC: 102 MMOL/L — SIGNIFICANT CHANGE UP (ref 96–108)
CO2 SERPL-SCNC: 24 MMOL/L — SIGNIFICANT CHANGE UP (ref 22–31)
CREAT SERPL-MCNC: 0.95 MG/DL — SIGNIFICANT CHANGE UP (ref 0.5–1.3)
GLUCOSE SERPL-MCNC: 110 MG/DL — HIGH (ref 70–99)
HCT VFR BLD CALC: 29.2 % — LOW (ref 39–50)
HGB BLD-MCNC: 9.8 G/DL — LOW (ref 13–17)
MCHC RBC-ENTMCNC: 30.7 PG — SIGNIFICANT CHANGE UP (ref 27–34)
MCHC RBC-ENTMCNC: 33.7 GM/DL — SIGNIFICANT CHANGE UP (ref 32–36)
MCV RBC AUTO: 91.2 FL — SIGNIFICANT CHANGE UP (ref 80–100)
PLATELET # BLD AUTO: 317 K/UL — SIGNIFICANT CHANGE UP (ref 150–400)
POTASSIUM SERPL-MCNC: 3.4 MMOL/L — LOW (ref 3.5–5.3)
POTASSIUM SERPL-MCNC: 4 MMOL/L — SIGNIFICANT CHANGE UP (ref 3.5–5.3)
POTASSIUM SERPL-SCNC: 3.4 MMOL/L — LOW (ref 3.5–5.3)
POTASSIUM SERPL-SCNC: 4 MMOL/L — SIGNIFICANT CHANGE UP (ref 3.5–5.3)
RBC # BLD: 3.2 M/UL — LOW (ref 4.2–5.8)
RBC # FLD: 12.6 % — SIGNIFICANT CHANGE UP (ref 10.3–14.5)
SODIUM SERPL-SCNC: 138 MMOL/L — SIGNIFICANT CHANGE UP (ref 135–145)
WBC # BLD: 8.7 K/UL — SIGNIFICANT CHANGE UP (ref 3.8–10.5)
WBC # FLD AUTO: 8.7 K/UL — SIGNIFICANT CHANGE UP (ref 3.8–10.5)

## 2018-10-26 PROCEDURE — 71046 X-RAY EXAM CHEST 2 VIEWS: CPT | Mod: 26

## 2018-10-26 RX ORDER — METOPROLOL TARTRATE 50 MG
75 TABLET ORAL THREE TIMES A DAY
Qty: 0 | Refills: 0 | Status: DISCONTINUED | OUTPATIENT
Start: 2018-10-26 | End: 2018-10-27

## 2018-10-26 RX ORDER — METOPROLOL TARTRATE 50 MG
25 TABLET ORAL ONCE
Qty: 0 | Refills: 0 | Status: COMPLETED | OUTPATIENT
Start: 2018-10-26 | End: 2018-10-26

## 2018-10-26 RX ORDER — FUROSEMIDE 40 MG
40 TABLET ORAL DAILY
Qty: 0 | Refills: 0 | Status: DISCONTINUED | OUTPATIENT
Start: 2018-10-26 | End: 2018-10-27

## 2018-10-26 RX ORDER — POTASSIUM CHLORIDE 20 MEQ
20 PACKET (EA) ORAL
Qty: 0 | Refills: 0 | Status: COMPLETED | OUTPATIENT
Start: 2018-10-26 | End: 2018-10-26

## 2018-10-26 RX ORDER — SPIRONOLACTONE 25 MG/1
25 TABLET, FILM COATED ORAL DAILY
Qty: 0 | Refills: 0 | Status: DISCONTINUED | OUTPATIENT
Start: 2018-10-26 | End: 2018-10-27

## 2018-10-26 RX ADMIN — Medication 50 MILLIGRAM(S): at 05:30

## 2018-10-26 RX ADMIN — Medication 8 UNIT(S): at 12:09

## 2018-10-26 RX ADMIN — Medication 8 UNIT(S): at 08:08

## 2018-10-26 RX ADMIN — Medication 20 MILLIEQUIVALENT(S): at 14:49

## 2018-10-26 RX ADMIN — Medication 20 MILLIEQUIVALENT(S): at 13:14

## 2018-10-26 RX ADMIN — ATORVASTATIN CALCIUM 80 MILLIGRAM(S): 80 TABLET, FILM COATED ORAL at 23:02

## 2018-10-26 RX ADMIN — Medication 100 MILLIGRAM(S): at 23:03

## 2018-10-26 RX ADMIN — PANTOPRAZOLE SODIUM 40 MILLIGRAM(S): 20 TABLET, DELAYED RELEASE ORAL at 05:30

## 2018-10-26 RX ADMIN — Medication 8 UNIT(S): at 17:13

## 2018-10-26 RX ADMIN — ENOXAPARIN SODIUM 40 MILLIGRAM(S): 100 INJECTION SUBCUTANEOUS at 12:08

## 2018-10-26 RX ADMIN — CLOPIDOGREL BISULFATE 75 MILLIGRAM(S): 75 TABLET, FILM COATED ORAL at 12:09

## 2018-10-26 RX ADMIN — SPIRONOLACTONE 25 MILLIGRAM(S): 25 TABLET, FILM COATED ORAL at 14:49

## 2018-10-26 RX ADMIN — Medication 75 MILLIGRAM(S): at 23:02

## 2018-10-26 RX ADMIN — Medication 325 MILLIGRAM(S): at 12:08

## 2018-10-26 RX ADMIN — Medication 75 MILLIGRAM(S): at 14:49

## 2018-10-26 RX ADMIN — Medication 40 MILLIGRAM(S): at 14:49

## 2018-10-26 RX ADMIN — Medication 25 MILLIGRAM(S): at 08:20

## 2018-10-26 RX ADMIN — Medication 20 MILLIEQUIVALENT(S): at 12:07

## 2018-10-26 NOTE — PROGRESS NOTE ADULT - SUBJECTIVE AND OBJECTIVE BOX
INTERVAL HPI/OVERNIGHT EVENTS:  Vital Signs Last 24 Hrs  T(C): 37.2 (26 Oct 2018 05:53), Max: 37.7 (25 Oct 2018 21:57)  T(F): 99 (26 Oct 2018 05:53), Max: 99.9 (25 Oct 2018 21:57)  HR: 89 (26 Oct 2018 08:20) (89 - 102)  BP: 99/65 (26 Oct 2018 08:20) (99/65 - 117/74)  BP(mean): --  RR: 18 (26 Oct 2018 08:20) (18 - 18)  SpO2: 90% (26 Oct 2018 08:20) (90% - 92%)  I&O's Summary    25 Oct 2018 07:01  -  26 Oct 2018 07:00  --------------------------------------------------------  IN: 840 mL / OUT: 680 mL / NET: 160 mL    26 Oct 2018 07:01  -  26 Oct 2018 12:18  --------------------------------------------------------  IN: 120 mL / OUT: 0 mL / NET: 120 mL      MEDICATIONS  (STANDING):  aspirin enteric coated 325 milliGRAM(s) Oral daily  atorvastatin 80 milliGRAM(s) Oral at bedtime  buDESOnide   0.25 milliGRAM(s) Respule 0.25 milliGRAM(s) Inhalation every 12 hours  clopidogrel Tablet 75 milliGRAM(s) Oral daily  dextrose 50% Injectable 50 milliLiter(s) IV Push every 15 minutes  dextrose 50% Injectable 25 milliLiter(s) IV Push every 15 minutes  docusate sodium 100 milliGRAM(s) Oral three times a day  enoxaparin Injectable 40 milliGRAM(s) SubCutaneous daily  insulin glargine Injectable (LANTUS) 24 Unit(s) SubCutaneous at bedtime  insulin Infusion 1 Unit(s)/Hr (1 mL/Hr) IV Continuous <Continuous>  insulin lispro Injectable (HumaLOG) 8 Unit(s) SubCutaneous before breakfast  insulin lispro Injectable (HumaLOG) 8 Unit(s) SubCutaneous before lunch  insulin lispro Injectable (HumaLOG) 8 Unit(s) SubCutaneous before dinner  insulin regular  human corrective regimen sliding scale   SubCutaneous Before meals and at bedtime  metoprolol tartrate 75 milliGRAM(s) Oral three times a day  pantoprazole    Tablet 40 milliGRAM(s) Oral before breakfast  potassium chloride    Tablet ER 20 milliEquivalent(s) Oral every 2 hours  sodium chloride 0.9%. 1000 milliLiter(s) (10 mL/Hr) IV Continuous <Continuous>    MEDICATIONS  (PRN):  acetaminophen  IVPB .. 1000 milliGRAM(s) IV Intermittent once PRN Mild Pain (1 - 3)  HYDROmorphone   Tablet 2 milliGRAM(s) Oral every 3 hours PRN Moderate Pain (4 - 6)  HYDROmorphone   Tablet 4 milliGRAM(s) Oral every 3 hours PRN Severe Pain (7 - 10)    LABS:                        9.8    8.7   )-----------( 317      ( 26 Oct 2018 07:21 )             29.2     10-26    138  |  102  |  11  ----------------------------<  110<H>  3.4<L>   |  24  |  0.95    Ca    8.7      26 Oct 2018 07:20          CAPILLARY BLOOD GLUCOSE      POCT Blood Glucose.: 101 mg/dL (26 Oct 2018 11:50)  POCT Blood Glucose.: 129 mg/dL (26 Oct 2018 07:52)  POCT Blood Glucose.: 120 mg/dL (25 Oct 2018 21:30)  POCT Blood Glucose.: 125 mg/dL (25 Oct 2018 16:31)          REVIEW OF SYSTEMS:  CONSTITUTIONAL: No fever, weight loss, or fatigue  EYES: No eye pain, visual disturbances, or discharge  ENMT:  No difficulty hearing, tinnitus, vertigo; No sinus or throat pain  NECK: No pain or stiffness  BREASTS: No pain, masses, or nipple discharge  RESPIRATORY: No cough, wheezing, chills or hemoptysis; No shortness of breath  CARDIOVASCULAR: No chest pain, palpitations, dizziness, or leg swelling  GASTROINTESTINAL: No abdominal or epigastric pain. No nausea, vomiting, or hematemesis; No diarrhea or constipation. No melena or hematochezia.  GENITOURINARY: No dysuria, frequency, hematuria, or incontinence  NEUROLOGICAL: No headaches, memory loss, loss of strength, numbness, or tremors  SKIN: No itching, burning, rashes, or lesions   LYMPH NODES: No enlarged glands  ENDOCRINE: No heat or cold intolerance; No hair loss  MUSCULOSKELETAL: No joint pain or swelling; No muscle, back, or extremity pain  PSYCHIATRIC: No depression, anxiety, mood swings, or difficulty sleeping  HEME/LYMPH: No easy bruising, or bleeding gums  ALLERY AND IMMUNOLOGIC: No hives or eczema    RADIOLOGY & ADDITIONAL TESTS:    Consultant(s) Notes Reviewed:  [x ] YES  [ ] NO    PHYSICAL EXAM:  GENERAL: NAD, well-groomed, well-developed,not in any distress ,  HEAD:  Atraumatic, Normocephalic  EYES: EOMI, PERRLA, conjunctiva and sclera clear  ENMT: No tonsillar erythema, exudates, or enlargement; Moist mucous membranes, Good dentition, No lesions  NECK: Supple, No JVD, Normal thyroid  NERVOUS SYSTEM:  Alert & Oriented X3, No focal deficit   CHEST/LUNG: Good air entry bilateral with no  rales, rhonchi, wheezing, or rubs  HEART: Regular rate and rhythm; No murmurs, rubs, or gallops  ABDOMEN: Soft, Nontender, Nondistended; Bowel sounds present  EXTREMITIES:  2+ Peripheral Pulses, No clubbing, cyanosis, or edema  SKIN: No rashes or lesions    Care Discussed with Consultants/Other Providers [ x] YES  [ ] NO INTERVAL HPI/OVERNIGHT EVENTS: I feel fine.   Vital Signs Last 24 Hrs  T(C): 37.2 (26 Oct 2018 05:53), Max: 37.7 (25 Oct 2018 21:57)  T(F): 99 (26 Oct 2018 05:53), Max: 99.9 (25 Oct 2018 21:57)  HR: 89 (26 Oct 2018 08:20) (89 - 102)  BP: 99/65 (26 Oct 2018 08:20) (99/65 - 117/74)  BP(mean): --  RR: 18 (26 Oct 2018 08:20) (18 - 18)  SpO2: 90% (26 Oct 2018 08:20) (90% - 92%)  I&O's Summary    25 Oct 2018 07:01  -  26 Oct 2018 07:00  --------------------------------------------------------  IN: 840 mL / OUT: 680 mL / NET: 160 mL    26 Oct 2018 07:01  -  26 Oct 2018 12:18  --------------------------------------------------------  IN: 120 mL / OUT: 0 mL / NET: 120 mL      MEDICATIONS  (STANDING):  aspirin enteric coated 325 milliGRAM(s) Oral daily  atorvastatin 80 milliGRAM(s) Oral at bedtime  buDESOnide   0.25 milliGRAM(s) Respule 0.25 milliGRAM(s) Inhalation every 12 hours  clopidogrel Tablet 75 milliGRAM(s) Oral daily  dextrose 50% Injectable 50 milliLiter(s) IV Push every 15 minutes  dextrose 50% Injectable 25 milliLiter(s) IV Push every 15 minutes  docusate sodium 100 milliGRAM(s) Oral three times a day  enoxaparin Injectable 40 milliGRAM(s) SubCutaneous daily  insulin glargine Injectable (LANTUS) 24 Unit(s) SubCutaneous at bedtime  insulin Infusion 1 Unit(s)/Hr (1 mL/Hr) IV Continuous <Continuous>  insulin lispro Injectable (HumaLOG) 8 Unit(s) SubCutaneous before breakfast  insulin lispro Injectable (HumaLOG) 8 Unit(s) SubCutaneous before lunch  insulin lispro Injectable (HumaLOG) 8 Unit(s) SubCutaneous before dinner  insulin regular  human corrective regimen sliding scale   SubCutaneous Before meals and at bedtime  metoprolol tartrate 75 milliGRAM(s) Oral three times a day  pantoprazole    Tablet 40 milliGRAM(s) Oral before breakfast  potassium chloride    Tablet ER 20 milliEquivalent(s) Oral every 2 hours  sodium chloride 0.9%. 1000 milliLiter(s) (10 mL/Hr) IV Continuous <Continuous>    MEDICATIONS  (PRN):  acetaminophen  IVPB .. 1000 milliGRAM(s) IV Intermittent once PRN Mild Pain (1 - 3)  HYDROmorphone   Tablet 2 milliGRAM(s) Oral every 3 hours PRN Moderate Pain (4 - 6)  HYDROmorphone   Tablet 4 milliGRAM(s) Oral every 3 hours PRN Severe Pain (7 - 10)    LABS:                        9.8    8.7   )-----------( 317      ( 26 Oct 2018 07:21 )             29.2     10-26    138  |  102  |  11  ----------------------------<  110<H>  3.4<L>   |  24  |  0.95    Ca    8.7      26 Oct 2018 07:20          CAPILLARY BLOOD GLUCOSE      POCT Blood Glucose.: 101 mg/dL (26 Oct 2018 11:50)  POCT Blood Glucose.: 129 mg/dL (26 Oct 2018 07:52)  POCT Blood Glucose.: 120 mg/dL (25 Oct 2018 21:30)  POCT Blood Glucose.: 125 mg/dL (25 Oct 2018 16:31)          REVIEW OF SYSTEMS:  CONSTITUTIONAL: No fever, weight loss, or fatigue  EYES: No eye pain, visual disturbances, or discharge  ENMT:  No difficulty hearing, tinnitus, vertigo; No sinus or throat pain  NECK: No pain or stiffness  RESPIRATORY: No cough, wheezing, chills or hemoptysis; No shortness of breath  CARDIOVASCULAR: No chest pain, palpitations, dizziness, or leg swelling  GASTROINTESTINAL: No abdominal or epigastric pain. No nausea, vomiting, or hematemesis; No diarrhea or constipation. No melena or hematochezia.  GENITOURINARY: No dysuria, frequency, hematuria, or incontinence  NEUROLOGICAL: No headaches, memory loss, loss of strength, numbness, or tremors    Consultant(s) Notes Reviewed:  [x ] YES  [ ] NO    PHYSICAL EXAM:  GENERAL: NAD, well-groomed, well-developed ,not in any distress ,  HEAD:  Atraumatic, Normocephalic  EYES: EOMI, PERRLA, conjunctiva and sclera clear  ENMT: No tonsillar erythema, exudates, or enlargement; Moist mucous membranes, Good dentition, No lesions  NECK: Supple, No JVD, Normal thyroid  NERVOUS SYSTEM:  Alert & Oriented X3, No focal deficit   CHEST/LUNG: Good air entry bilateral with no  rales, rhonchi, wheezing, or rubs  HEART: Regular rate and rhythm; No murmurs, rubs, or gallops  ABDOMEN: Soft, Nontender, Nondistended; Bowel sounds present  EXTREMITIES:  2+ Peripheral Pulses, No clubbing, cyanosis, or edema    Care Discussed with Consultants/Other Providers [ x] YES  [ ] NO

## 2018-10-26 NOTE — PROGRESS NOTE ADULT - SUBJECTIVE AND OBJECTIVE BOX
Endocrinology Attending Covering for Dr. Day      Chief complaint  Patient is a 58y old  Male who presents with a chief complaint of Cardiac Surgery evaluation (26 Oct 2018 12:17)   Review of systems  Patient in bed, looks comfortable, no fever,  had no hypoglycemia.    Labs and Fingersticks  CAPILLARY BLOOD GLUCOSE      POCT Blood Glucose.: 101 mg/dL (26 Oct 2018 11:50)  POCT Blood Glucose.: 129 mg/dL (26 Oct 2018 07:52)  POCT Blood Glucose.: 120 mg/dL (25 Oct 2018 21:30)  POCT Blood Glucose.: 125 mg/dL (25 Oct 2018 16:31)      Anion Gap, Serum: 12 (10-26 @ 07:20)  Anion Gap, Serum: 15 (10-25 @ 05:26)      Calcium, Total Serum: 8.7 (10-26 @ 07:20)  Calcium, Total Serum: 9.4 (10-25 @ 05:26)          10-26    138  |  102  |  11  ----------------------------<  110<H>  3.4<L>   |  24  |  0.95    Ca    8.7      26 Oct 2018 07:20                          9.8    8.7   )-----------( 317      ( 26 Oct 2018 07:21 )             29.2     Medications  MEDICATIONS  (STANDING):  aspirin enteric coated 325 milliGRAM(s) Oral daily  atorvastatin 80 milliGRAM(s) Oral at bedtime  buDESOnide   0.25 milliGRAM(s) Respule 0.25 milliGRAM(s) Inhalation every 12 hours  clopidogrel Tablet 75 milliGRAM(s) Oral daily  dextrose 50% Injectable 50 milliLiter(s) IV Push every 15 minutes  dextrose 50% Injectable 25 milliLiter(s) IV Push every 15 minutes  docusate sodium 100 milliGRAM(s) Oral three times a day  enoxaparin Injectable 40 milliGRAM(s) SubCutaneous daily  furosemide    Tablet 40 milliGRAM(s) Oral daily  insulin glargine Injectable (LANTUS) 24 Unit(s) SubCutaneous at bedtime  insulin Infusion 1 Unit(s)/Hr (1 mL/Hr) IV Continuous <Continuous>  insulin lispro Injectable (HumaLOG) 8 Unit(s) SubCutaneous before breakfast  insulin lispro Injectable (HumaLOG) 8 Unit(s) SubCutaneous before lunch  insulin lispro Injectable (HumaLOG) 8 Unit(s) SubCutaneous before dinner  insulin regular  human corrective regimen sliding scale   SubCutaneous Before meals and at bedtime  metoprolol tartrate 75 milliGRAM(s) Oral three times a day  pantoprazole    Tablet 40 milliGRAM(s) Oral before breakfast  sodium chloride 0.9%. 1000 milliLiter(s) (10 mL/Hr) IV Continuous <Continuous>  spironolactone 25 milliGRAM(s) Oral daily      Physical Exam  General: Patient comfortable in bed  Vital Signs Last 12 Hrs  T(F): 98.7 (10-26-18 @ 13:38), Max: 99 (10-26-18 @ 05:53)  HR: 93 (10-26-18 @ 14:35) (82 - 93)  BP: 142/76 (10-26-18 @ 14:35) (99/65 - 142/76)  BP(mean): --  RR: 18 (10-26-18 @ 13:38) (18 - 18)  SpO2: 97% (10-26-18 @ 13:38) (90% - 97%)  Neck: No palpable thyroid nodules.  CVS: S1S2, No murmurs  Respiratory: No wheezing, no crepitations  GI: Abdomen soft, bowel sounds positive  Musculoskeletal:  edema lower extremities.   Skin: No skin rashes, no ecchymosis    Diagnostics

## 2018-10-26 NOTE — CHART NOTE - NSCHARTNOTEFT_GEN_A_CORE
Pt seen for consult: reinforcement of nutrition education. Per NP Gwendolyn Palmer and per review of RD initial assessment, pt has not been receptive to T2DM diet/management and heart healthy diet recommendations provided.    Pt is a 58 year old male pt with PMH significant for HTN and T2DM (on sulfonylurea PTA) presenting to ED with c/o midsternal chest pain radiating to left arm, now s/p CABG x 3 10/22/18 and ASD closure.      Source: Patient [x]    Family [x] daughter at bedside     other [ ]    Diet : Consistent CHO no snacks, Low Sodium    Patient reports [ ] nausea  [ ] vomiting [ ] diarrhea [ ] constipation  [ ]chewing problems [ ] swallowing issues  [x] other: Pt denies GI distress    PO intake:  < 50% [ ] 50-75% [ ]   % [x]  other :    Source for PO intake [x] Patient [ ] family [x] chart [ ] staff [ ] other    Enteral /Parenteral Nutrition: n/a    Current Weight: 168.6 pounds (current, standing, +1 edema R leg). 158.7 pounds admit wt; 10 pound wt gain likely r/t post-op fluid retention.    Pertinent Medications: MEDICATIONS  (STANDING):  aspirin enteric coated 325 milliGRAM(s) Oral daily  atorvastatin 80 milliGRAM(s) Oral at bedtime  buDESOnide   0.25 milliGRAM(s) Respule 0.25 milliGRAM(s) Inhalation every 12 hours  clopidogrel Tablet 75 milliGRAM(s) Oral daily  dextrose 50% Injectable 50 milliLiter(s) IV Push every 15 minutes  dextrose 50% Injectable 25 milliLiter(s) IV Push every 15 minutes  docusate sodium 100 milliGRAM(s) Oral three times a day  enoxaparin Injectable 40 milliGRAM(s) SubCutaneous daily  furosemide    Tablet 40 milliGRAM(s) Oral daily  insulin glargine Injectable (LANTUS) 24 Unit(s) SubCutaneous at bedtime  insulin Infusion 1 Unit(s)/Hr (1 mL/Hr) IV Continuous <Continuous>  insulin lispro Injectable (HumaLOG) 8 Unit(s) SubCutaneous before breakfast  insulin lispro Injectable (HumaLOG) 8 Unit(s) SubCutaneous before lunch  insulin lispro Injectable (HumaLOG) 8 Unit(s) SubCutaneous before dinner  insulin regular  human corrective regimen sliding scale   SubCutaneous Before meals and at bedtime  metoprolol tartrate 75 milliGRAM(s) Oral three times a day  pantoprazole    Tablet 40 milliGRAM(s) Oral before breakfast  sodium chloride 0.9%. 1000 milliLiter(s) (10 mL/Hr) IV Continuous <Continuous>  spironolactone 25 milliGRAM(s) Oral daily    MEDICATIONS  (PRN):  acetaminophen  IVPB .. 1000 milliGRAM(s) IV Intermittent once PRN Mild Pain (1 - 3)  HYDROmorphone   Tablet 2 milliGRAM(s) Oral every 3 hours PRN Moderate Pain (4 - 6)  HYDROmorphone   Tablet 4 milliGRAM(s) Oral every 3 hours PRN Severe Pain (7 - 10)    Pertinent Labs:  10-26 Na138 mmol/L Glu 110 mg/dL<H> K+ 3.4 mmol/L<L> Cr  0.95 mg/dL BUN 11 mg/dL 10-22 Phos 3.3 mg/dL 10-24 Alb 4.1 g/dL 10-16 [id number] 9.0 %<H>  FS: 101, 129, 120      Skin: Surgical incision noted      Estimated Needs:   [x] no change since previous assessment  [ ] recalculated:       Previous Nutrition Diagnosis: Not ready for diet/lifestyle change       Nutrition Diagnosis is [x] ongoing  [ ] resolved [ ] not applicable        New Nutrition Diagnosis: [x] not applicable       Interventions:     1)       Monitoring and Evaluation:     [ ] PO intake [ ] Tolerance to diet prescription [ ] weights [ ] follow up per protocol    [ ] other: Pt seen for consult: reinforcement of nutrition education. Per NP Gwendolyn Palmer and per review of RD initial assessment, pt has not been receptive to T2DM diet/management and heart healthy diet recommendations provided. Pt seen today with daughter at bedside, amenable to discuss indication for recommendations. Admits to frequent intake of refined CHO snacks PTA, ate muffins, cakes and cookies frequently PTA, was on sulfonylurea but had not followed up with MD 2 years after it was prescribed. Checks fasting fingersticks daily but not at any other time during the day. Does not know what a HgA1c is, and what goal fingerstick ranges are.    Pt is a 58 year old male pt with PMH significant for HTN and T2DM (on sulfonylurea PTA) presenting to ED with c/o midsternal chest pain radiating to left arm, now s/p CABG x 3 10/22/18 and ASD closure. Per NP, plan for pt to be d/c on insulin.    Source: Patient [x]    Family [x] daughter at bedside     other [ ]    Diet : Consistent CHO no snacks, Low Sodium    Patient reports [ ] nausea  [ ] vomiting [ ] diarrhea [ ] constipation  [ ]chewing problems [ ] swallowing issues  [x] other: Pt denies GI distress    PO intake:  < 50% [ ] 50-75% [ ]   % [x]  other :    Source for PO intake [x] Patient [ ] family [x] chart [ ] staff [ ] other    Enteral /Parenteral Nutrition: n/a    Current Weight: 168.6 pounds (current, standing, +1 edema R leg). 158.7 pounds admit wt; 10 pound wt gain likely r/t post-op fluid retention. On lasix at this time.    Pertinent Medications: MEDICATIONS  (STANDING):  aspirin enteric coated 325 milliGRAM(s) Oral daily  atorvastatin 80 milliGRAM(s) Oral at bedtime  buDESOnide   0.25 milliGRAM(s) Respule 0.25 milliGRAM(s) Inhalation every 12 hours  clopidogrel Tablet 75 milliGRAM(s) Oral daily  dextrose 50% Injectable 50 milliLiter(s) IV Push every 15 minutes  dextrose 50% Injectable 25 milliLiter(s) IV Push every 15 minutes  docusate sodium 100 milliGRAM(s) Oral three times a day  enoxaparin Injectable 40 milliGRAM(s) SubCutaneous daily  furosemide    Tablet 40 milliGRAM(s) Oral daily  insulin glargine Injectable (LANTUS) 24 Unit(s) SubCutaneous at bedtime  insulin Infusion 1 Unit(s)/Hr (1 mL/Hr) IV Continuous <Continuous>  insulin lispro Injectable (HumaLOG) 8 Unit(s) SubCutaneous before breakfast  insulin lispro Injectable (HumaLOG) 8 Unit(s) SubCutaneous before lunch  insulin lispro Injectable (HumaLOG) 8 Unit(s) SubCutaneous before dinner  insulin regular  human corrective regimen sliding scale   SubCutaneous Before meals and at bedtime  metoprolol tartrate 75 milliGRAM(s) Oral three times a day  pantoprazole    Tablet 40 milliGRAM(s) Oral before breakfast  sodium chloride 0.9%. 1000 milliLiter(s) (10 mL/Hr) IV Continuous <Continuous>  spironolactone 25 milliGRAM(s) Oral daily    MEDICATIONS  (PRN):  acetaminophen  IVPB .. 1000 milliGRAM(s) IV Intermittent once PRN Mild Pain (1 - 3)  HYDROmorphone   Tablet 2 milliGRAM(s) Oral every 3 hours PRN Moderate Pain (4 - 6)  HYDROmorphone   Tablet 4 milliGRAM(s) Oral every 3 hours PRN Severe Pain (7 - 10)    Pertinent Labs:  10-26 Na138 mmol/L Glu 110 mg/dL<H> K+ 3.4 mmol/L<L> Cr  0.95 mg/dL BUN 11 mg/dL 10-22 Phos 3.3 mg/dL 10-24 Alb 4.1 g/dL 10-16 [id number] 9.0 %<H>  FS: 101, 129, 120      Skin: Surgical incision noted      Estimated Needs:   [x] no change since previous assessment  [ ] recalculated:       Previous Nutrition Diagnosis: Not ready for diet/lifestyle change       Nutrition Diagnosis is [x] ongoing  [ ] resolved [ ] not applicable        New Nutrition Diagnosis: [x] not applicable       Interventions:     1) Continue current diet for heart health and glucose control  2) Extensive post-op, T2DM diet/management and heart healthy education provided to pt and daughter at bedside. Discussed the following: Importance of DM control at preventing complications, goal HGA1c and fingerstick ranges, SMBG, hypoglycemia risk with insulin use, mediation adherence and outpatient f/u, s/s of hypoglycemia, and ways to prevent/treat hypoglycemia, adequate intake of protein and energy post-op, adequate intake of non-starchy vegetables, limiting intake of sodium and refined CHO foods/beverages to promote glucose control and heart health, CHO consistency with insulin use, CHO exchange list. Pt and daughter voiced understanding and are willing to apply changes. Pt already has T2DM nutrition therapy handout; Low Sodium nutrition therapy handout, also provided Heart-healthy nutrition therapy handout for review at his leisure.     Monitoring and Evaluation:     [x] PO intake [x] Tolerance to diet prescription [x] weights [x] follow up per protocol    [x] other: RD remains available: Maricruz Dickerson MS, RDN, CDN, CDE. #[phone]

## 2018-10-26 NOTE — PROGRESS NOTE ADULT - SUBJECTIVE AND OBJECTIVE BOX
pt seen and examined, no complaints, ROS - .     acetaminophen  IVPB .. 1000 milliGRAM(s) IV Intermittent once PRN  aspirin enteric coated 325 milliGRAM(s) Oral daily  atorvastatin 80 milliGRAM(s) Oral at bedtime  buDESOnide   0.25 milliGRAM(s) Respule 0.25 milliGRAM(s) Inhalation every 12 hours  clopidogrel Tablet 75 milliGRAM(s) Oral daily  dextrose 50% Injectable 50 milliLiter(s) IV Push every 15 minutes  dextrose 50% Injectable 25 milliLiter(s) IV Push every 15 minutes  docusate sodium 100 milliGRAM(s) Oral three times a day  enoxaparin Injectable 40 milliGRAM(s) SubCutaneous daily  HYDROmorphone   Tablet 2 milliGRAM(s) Oral every 3 hours PRN  HYDROmorphone   Tablet 4 milliGRAM(s) Oral every 3 hours PRN  insulin glargine Injectable (LANTUS) 24 Unit(s) SubCutaneous at bedtime  insulin Infusion 1 Unit(s)/Hr IV Continuous <Continuous>  insulin lispro Injectable (HumaLOG) 8 Unit(s) SubCutaneous before breakfast  insulin lispro Injectable (HumaLOG) 8 Unit(s) SubCutaneous before lunch  insulin lispro Injectable (HumaLOG) 8 Unit(s) SubCutaneous before dinner  insulin regular  human corrective regimen sliding scale   SubCutaneous Before meals and at bedtime  metoprolol tartrate 50 milliGRAM(s) Oral every 8 hours  pantoprazole    Tablet 40 milliGRAM(s) Oral before breakfast  sodium chloride 0.9%. 1000 milliLiter(s) IV Continuous <Continuous>                            10.8   9.2   )-----------( 232      ( 25 Oct 2018 05:27 )             32.8       Hemoglobin: 10.8 g/dL (10-25 @ 05:27)  Hemoglobin: 9.8 g/dL (10-24 @ 02:01)  Hemoglobin: 12.3 g/dL (10-23 @ 02:05)  Hemoglobin: 11.7 g/dL (10-22 @ 19:11)  Hemoglobin: 14.8 g/dL (10-22 @ 06:33)      10-25    138  |  101  |  9   ----------------------------<  163<H>  4.0   |  22  |  0.89    Ca    9.4      25 Oct 2018 05:26      Creatinine Trend: 0.89<--, 0.83<--, 0.88<--, 0.78<--, 0.83<--, 0.91<--    COAGS:           T(C): 37.2 (10-26-18 @ 05:53), Max: 37.7 (10-25-18 @ 21:57)  HR: 89 (10-26-18 @ 05:53) (89 - 102)  BP: 106/66 (10-26-18 @ 05:53) (96/57 - 119/70)  RR: 18 (10-26-18 @ 05:53) (18 - 18)  SpO2: 91% (10-26-18 @ 05:53) (91% - 95%)  Wt(kg): --    I&O's Summary    24 Oct 2018 07:01  -  25 Oct 2018 07:00  --------------------------------------------------------  IN: 650 mL / OUT: 1300 mL / NET: -650 mL    25 Oct 2018 07:01  -  26 Oct 2018 06:15  --------------------------------------------------------  IN: 840 mL / OUT: 680 mL / NET: 160 mL          Appearance: Normal	  HEENT:   Normal oral mucosa, PERRL, EOMI	  Lymphatic: No lymphadenopathy , no edema  Cardiovascular: Normal S1 S2, No JVD, No murmurs , Peripheral pulses palpable 2+ bilaterally  Respiratory: Lungs clear to auscultation, normal effort 	  Gastrointestinal:  Soft, Non-tender, + BS	      TELEMETRY: SR	      DIAGNOSTIC TESTING:  [ ] Echocardiogram: < from: Transthoracic Echocardiogram (10.18.18 @ 12:46) >  Conclusions:  1. Normal left ventricular internal dimensions and wall  thicknesses.  2. Mild global left ventricular systolic dysfunction with  mild segmental abnormalities.  Mild hypokinesis of the base  to mid anteroseptal, septal walls.  3. Normal diastolic function  4. Normal right ventricular size and function.  *** No previous Echo exam.    < end of copied text >    [ ]  Catheterization:  -Cath with severe stenosis in OM1, LAD (which was functionally significant by IFR of 0.89) and RCA    [ ] Stress Test:  < from: Nuclear Stress Test-Exercise (10.17.18 @ 09:43) >  IMPRESSIONS:Abnormal Study  * Myocardial Perfusion SPECT results are abnormal at 90 %  of MPHR.  * There are medium sized, moderate defects in inferior and  inferoseptal walls that are reversible, suggestive of  ischemia.  * Post-stress gated wall motion analysis was performed  (LVEF = 52 %;LVEDV = 62 ml.), revealing mild hypokinesis  of the inferior wall. RV function appeared normal.  RV  function appeared normal.  *** No previous Nuclear/Stress exam.    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  s/p C3L , ASD closure       -  Dapt / statin    - glycemic control   - tolerating BB, increase as alisha and BP allows - tele stable   - pain management  - HD stable  - care per CTS  D/W Dr Green

## 2018-10-26 NOTE — PROGRESS NOTE ADULT - ASSESSMENT
58 year old male current smoker with family history of CAD with DM-II who presented to [facility] ED 10/17 complaining of chest pain/pressure with associated left arm numbness/weakness.  Denies dizziness, syncope, edema, orthopnea and PND.  CT head was performed showing no CVA and neurology was consulted who thought his symptoms were secondary to neuropathy.  Underwent a Cardiac work up, R/O MI, underwent Nuclear stress test revealing EF 52%, medium sized, moderate defects in inferior and inferoseptal walls that are reversible, suggestive of ischemia.   In light of patients cardiac risk factors, symptoms and abnormal noninvasive test findings there is high suspicion for CAD. Still have left shoulder pain with numbness left hand thumb and index finger.        Problem/Recommendation - 1:  Problem: Chest pain with Multivessel CAD . Recommendation: S/P LIMA-LAD, SVG-OM1, SVG-Diag, ASD closure .Doing very well. On Aspirin, Plavix Statin  .  CTS and Cardiology helping.      Problem/Recommendation - 2:  ·  Problem: left Shoulder and arm pain with Numbness and tingling in left hand.  Recommendation: CT Head noted . Neurology help appreciated and outpt follow up. X Ray noted and tendinosis. PRN Pain meds.     Problem/Recommendation - 3:  ·  Problem: Diabetes mellitus type 2 in nonobese.  Recommendation: Sugars in good range . Holding PO meds . Lantus and SSI .     Problem/Recommendation - 4:  ·  Problem: HLD (hyperlipidemia).  Recommendation: Statin .    Disposition : DC planning per CTS as pt wants to go home.

## 2018-10-26 NOTE — PROGRESS NOTE ADULT - SUBJECTIVE AND OBJECTIVE BOX
Patient with no anginal chest pain or shortness of breath.    Ros otherwise negative.        MEDICATIONS  (STANDING):  aspirin enteric coated 325 milliGRAM(s) Oral daily  atorvastatin 80 milliGRAM(s) Oral at bedtime  buDESOnide   0.25 milliGRAM(s) Respule 0.25 milliGRAM(s) Inhalation every 12 hours  clopidogrel Tablet 75 milliGRAM(s) Oral daily  dextrose 50% Injectable 50 milliLiter(s) IV Push every 15 minutes  dextrose 50% Injectable 25 milliLiter(s) IV Push every 15 minutes  docusate sodium 100 milliGRAM(s) Oral three times a day  enoxaparin Injectable 40 milliGRAM(s) SubCutaneous daily  insulin glargine Injectable (LANTUS) 24 Unit(s) SubCutaneous at bedtime  insulin Infusion 1 Unit(s)/Hr (1 mL/Hr) IV Continuous <Continuous>  insulin lispro Injectable (HumaLOG) 8 Unit(s) SubCutaneous before breakfast  insulin lispro Injectable (HumaLOG) 8 Unit(s) SubCutaneous before lunch  insulin lispro Injectable (HumaLOG) 8 Unit(s) SubCutaneous before dinner  insulin regular  human corrective regimen sliding scale   SubCutaneous Before meals and at bedtime  metoprolol tartrate 75 milliGRAM(s) Oral three times a day  pantoprazole    Tablet 40 milliGRAM(s) Oral before breakfast  potassium chloride    Tablet ER 20 milliEquivalent(s) Oral every 2 hours  sodium chloride 0.9%. 1000 milliLiter(s) (10 mL/Hr) IV Continuous <Continuous>    MEDICATIONS  (PRN):  acetaminophen  IVPB .. 1000 milliGRAM(s) IV Intermittent once PRN Mild Pain (1 - 3)  HYDROmorphone   Tablet 2 milliGRAM(s) Oral every 3 hours PRN Moderate Pain (4 - 6)  HYDROmorphone   Tablet 4 milliGRAM(s) Oral every 3 hours PRN Severe Pain (7 - 10)      LABS:                        9.8    8.7   )-----------( 317      ( 26 Oct 2018 07:21 )             29.2     Hemoglobin: 9.8 g/dL (10-26 @ 07:21)  Hemoglobin: 10.8 g/dL (10-25 @ 05:27)  Hemoglobin: 9.8 g/dL (10-24 @ 02:01)  Hemoglobin: 12.3 g/dL (10-23 @ 02:05)  Hemoglobin: 11.7 g/dL (10-22 @ 19:11)    10-26    138  |  102  |  11  ----------------------------<  110<H>  3.4<L>   |  24  |  0.95    Ca    8.7      26 Oct 2018 07:20      Creatinine Trend: 0.95<--, 0.89<--, 0.83<--, 0.88<--, 0.78<--, 0.83<--           PHYSICAL EXAM  Vital Signs Last 24 Hrs  T(C): 37.2 (26 Oct 2018 05:53), Max: 37.7 (25 Oct 2018 21:57)  T(F): 99 (26 Oct 2018 05:53), Max: 99.9 (25 Oct 2018 21:57)  HR: 89 (26 Oct 2018 08:20) (89 - 102)  BP: 99/65 (26 Oct 2018 08:20) (96/57 - 119/70)  BP(mean): 87 (25 Oct 2018 11:45) (71 - 87)  RR: 18 (26 Oct 2018 08:20) (18 - 18)  SpO2: 90% (26 Oct 2018 08:20) (90% - 95%)      Appearance: Normal	  HEENT:   Normal oral mucosa, PERRL, EOMI	  Lymphatic: No lymphadenopathy , no edema  Cardiovascular: Normal S1 S2, No JVD, No murmurs , Peripheral pulses palpable 2+ bilaterally  Respiratory: Lungs clear to auscultation, normal effort 	  Gastrointestinal:  Soft, Non-tender, + BS	      TELEMETRY: SR	      DIAGNOSTIC TESTING:  [ ] Echocardiogram: < from: Transthoracic Echocardiogram (10.18.18 @ 12:46) >  Conclusions:  1. Normal left ventricular internal dimensions and wall  thicknesses.  2. Mild global left ventricular systolic dysfunction with  mild segmental abnormalities.  Mild hypokinesis of the base  to mid anteroseptal, septal walls.  3. Normal diastolic function  4. Normal right ventricular size and function.  *** No previous Echo exam.    < end of copied text >    [ ]  Catheterization:  -Cath with severe stenosis in OM1, LAD (which was functionally significant by IFR of 0.89) and RCA    [ ] Stress Test:  < from: Nuclear Stress Test-Exercise (10.17.18 @ 09:43) >  IMPRESSIONS:Abnormal Study  * Myocardial Perfusion SPECT results are abnormal at 90 %  of MPHR.  * There are medium sized, moderate defects in inferior and  inferoseptal walls that are reversible, suggestive of  ischemia.  * Post-stress gated wall motion analysis was performed  (LVEF = 52 %;LVEDV = 62 ml.), revealing mild hypokinesis  of the inferior wall. RV function appeared normal.  RV  function appeared normal.  *** No previous Nuclear/Stress exam.    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  s/p C3L , ASD closure      -  progressing well  - cont Asa/plavix statin  - BB  - D/C planning per CTS    Octavia Baird PA-C

## 2018-10-26 NOTE — MEDICAL STUDENT PROGRESS NOTE(EDUCATION) - SUBJECTIVE AND OBJECTIVE BOX
Pt states "I'm feeling well". Resting comfortably in bed in NAD.      59 y/o Male PMH significant for HTN and NIDDMII presenting to ED with c/o midsternal chest pain radiating to left arm, abnormal NST and cath revealing moderate defects to interiolateral wall reversible, currently POD 4 from C3L ASD closure.  No significant overnight events, hemodynamically stable, preparation for discharge possibly tomorrow.        Vital Signs Last 24 Hrs  T(C): 37.1 (26 Oct 2018 13:38), Max: 37.7 (25 Oct 2018 21:57)  T(F): 98.7 (26 Oct 2018 13:38), Max: 99.9 (25 Oct 2018 21:57)  HR: 93 (26 Oct 2018 14:35) (82 - 95)  BP: 142/76 (26 Oct 2018 14:35) (99/65 - 142/76)  BP(mean): --  RR: 18 (26 Oct 2018 13:38) (18 - 18)  SpO2: 97% (26 Oct 2018 13:38) (90% - 97%)    CVP:   PAP:   CI:   SvO2:      10-25 @ 07:01  -  10-26 @ 07:00  --------------------------------------------------------  IN:    Oral Fluid: 840 mL  Total IN: 840 mL    OUT:    Voided: 680 mL  Total OUT: 680 mL    Total NET: 160 mL      10-26 @ 07:01  -  10-26 @ 15:24  --------------------------------------------------------  IN:    Oral Fluid: 120 mL  Total IN: 120 mL    OUT:    Voided: 370 mL  Total OUT: 370 mL    Total NET: -250 mL      LABS:                        9.8    8.7   )-----------( 317      ( 26 Oct 2018 07:21 )             29.2     10-26    138  |  102  |  11  ----------------------------<  110<H>  3.4<L>   |  24  |  0.95    Ca    8.7      26 Oct 2018 07:20        MEDICATIONS  (STANDING):  aspirin enteric coated 325 milliGRAM(s) Oral daily  atorvastatin 80 milliGRAM(s) Oral at bedtime  buDESOnide   0.25 milliGRAM(s) Respule 0.25 milliGRAM(s) Inhalation every 12 hours  clopidogrel Tablet 75 milliGRAM(s) Oral daily  dextrose 50% Injectable 50 milliLiter(s) IV Push every 15 minutes  dextrose 50% Injectable 25 milliLiter(s) IV Push every 15 minutes  docusate sodium 100 milliGRAM(s) Oral three times a day  enoxaparin Injectable 40 milliGRAM(s) SubCutaneous daily  furosemide    Tablet 40 milliGRAM(s) Oral daily  insulin glargine Injectable (LANTUS) 24 Unit(s) SubCutaneous at bedtime  insulin Infusion 1 Unit(s)/Hr (1 mL/Hr) IV Continuous <Continuous>  insulin lispro Injectable (HumaLOG) 8 Unit(s) SubCutaneous before breakfast  insulin lispro Injectable (HumaLOG) 8 Unit(s) SubCutaneous before lunch  insulin lispro Injectable (HumaLOG) 8 Unit(s) SubCutaneous before dinner  insulin regular  human corrective regimen sliding scale   SubCutaneous Before meals and at bedtime  metoprolol tartrate 75 milliGRAM(s) Oral three times a day  pantoprazole    Tablet 40 milliGRAM(s) Oral before breakfast  sodium chloride 0.9%. 1000 milliLiter(s) (10 mL/Hr) IV Continuous <Continuous>  spironolactone 25 milliGRAM(s) Oral daily    MEDICATIONS  (PRN):  acetaminophen  IVPB .. 1000 milliGRAM(s) IV Intermittent once PRN Mild Pain (1 - 3)  HYDROmorphone   Tablet 2 milliGRAM(s) Oral every 3 hours PRN Moderate Pain (4 - 6)  HYDROmorphone   Tablet 4 milliGRAM(s) Oral every 3 hours PRN Severe Pain (7 - 10)      PHYSICAL EXAM:  General: A+Ox3, in NAD, follows commands, no focal deficits noted  Cardiovascular: S1, S2, RRR.  Lungs: Clear to auscultation, nonlabored even respirations   Abdomen: Soft, Non-distended, non-tender, normoactive bowel sounds  Extremities: Warm, well perfused, 2+ distal pulses, no edema    Incision: Sternal incision c/d/i without evidence of infection or crepitus, abd sutures c/d/i      9 y/o Male PMH significant for HTN and NIDDMII POD day 4 C3L ASD repair currently stable on cont tele monitoring with potential discharge 10/27.    CV:  -  mg, plavix 75 mg  - Recent wt gain 4 kg  - Introduce lasix 40 mg/aldactone 25 mg daily  -K+ Pt states "I'm feeling well". Resting comfortably in bed in NAD.      57 y/o Male PMH significant for HTN and NIDDMII presenting to ED with c/o midsternal chest pain radiating to left arm, abnormal NST, moderate defects to interiolateral wall reversibl and EF of 51%, currently POD 4 from C3L ASD closure.  No significant overnight events, hemodynamically stable, preparation for discharge possibly tomorrow.        Vital Signs Last 24 Hrs  T(C): 37.1 (26 Oct 2018 13:38), Max: 37.7 (25 Oct 2018 21:57)  T(F): 98.7 (26 Oct 2018 13:38), Max: 99.9 (25 Oct 2018 21:57)  HR: 93 (26 Oct 2018 14:35) (82 - 95)  BP: 142/76 (26 Oct 2018 14:35) (99/65 - 142/76)  BP(mean): --  RR: 18 (26 Oct 2018 13:38) (18 - 18)  SpO2: 97% (26 Oct 2018 13:38) (90% - 97%)    CVP:   PAP:   CI:   SvO2:      10-25 @ 07:01  -  10-26 @ 07:00  --------------------------------------------------------  IN:    Oral Fluid: 840 mL  Total IN: 840 mL    OUT:    Voided: 680 mL  Total OUT: 680 mL    Total NET: 160 mL      10-26 @ 07:01  -  10-26 @ 15:24  --------------------------------------------------------  IN:    Oral Fluid: 120 mL  Total IN: 120 mL    OUT:    Voided: 370 mL  Total OUT: 370 mL    Total NET: -250 mL      LABS:                        9.8    8.7   )-----------( 317      ( 26 Oct 2018 07:21 )             29.2     10-26    138  |  102  |  11  ----------------------------<  110<H>  3.4<L>   |  24  |  0.95    Ca    8.7      26 Oct 2018 07:20        MEDICATIONS  (STANDING):  aspirin enteric coated 325 milliGRAM(s) Oral daily  atorvastatin 80 milliGRAM(s) Oral at bedtime  buDESOnide   0.25 milliGRAM(s) Respule 0.25 milliGRAM(s) Inhalation every 12 hours  clopidogrel Tablet 75 milliGRAM(s) Oral daily  dextrose 50% Injectable 50 milliLiter(s) IV Push every 15 minutes  dextrose 50% Injectable 25 milliLiter(s) IV Push every 15 minutes  docusate sodium 100 milliGRAM(s) Oral three times a day  enoxaparin Injectable 40 milliGRAM(s) SubCutaneous daily  furosemide    Tablet 40 milliGRAM(s) Oral daily  insulin glargine Injectable (LANTUS) 24 Unit(s) SubCutaneous at bedtime  insulin Infusion 1 Unit(s)/Hr (1 mL/Hr) IV Continuous <Continuous>  insulin lispro Injectable (HumaLOG) 8 Unit(s) SubCutaneous before breakfast  insulin lispro Injectable (HumaLOG) 8 Unit(s) SubCutaneous before lunch  insulin lispro Injectable (HumaLOG) 8 Unit(s) SubCutaneous before dinner  insulin regular  human corrective regimen sliding scale   SubCutaneous Before meals and at bedtime  metoprolol tartrate 75 milliGRAM(s) Oral three times a day  pantoprazole    Tablet 40 milliGRAM(s) Oral before breakfast  sodium chloride 0.9%. 1000 milliLiter(s) (10 mL/Hr) IV Continuous <Continuous>  spironolactone 25 milliGRAM(s) Oral daily    MEDICATIONS  (PRN):  acetaminophen  IVPB .. 1000 milliGRAM(s) IV Intermittent once PRN Mild Pain (1 - 3)  HYDROmorphone   Tablet 2 milliGRAM(s) Oral every 3 hours PRN Moderate Pain (4 - 6)  HYDROmorphone   Tablet 4 milliGRAM(s) Oral every 3 hours PRN Severe Pain (7 - 10)      PHYSICAL EXAM:  General: A+Ox3, in NAD, follows commands, no focal deficits noted  Cardiovascular: S1, S2, RRR.  Lungs: Clear to auscultation, nonlabored even respirations   Abdomen: Soft, Non-distended, non-tender, normoactive bowel sounds  Extremities: Warm, well perfused, 2+ distal pulses, no edema    Incision: Sternal incision c/d/i without evidence of infection or crepitus, abd sutures c/d/i      7 y/o Male PMH significant for HTN and NIDDMII POD day 4 C3L ASD repair currently stable on cont tele monitoring with potential discharge 10/27.    CV:  -  mg, plavix 75 mg  - Recent wt gain 4 kg  - Introduce lasix 40 mg/aldactone 25 mg daily  -K+3.4, replacement with x3 doses 10MEQ to maintain >4.0  - HR 90s-100s SR, increase metoprolol to 75 mg TID with hold parameters  - Smoking cessation    Resp:  - currently on RA, maintain sat > 94%  - encourage ambulation, incentive spirometry    Renal:  - negative fluid balance  -  11/0.95    Endocrine:  - Appreciate endocrine recs  - nutritional consult  - last fs 101  - c/w premeal insulin with sliding scale  - lantus 24 units QHS  - patient education on self insulin administration

## 2018-10-26 NOTE — PROGRESS NOTE ADULT - SUBJECTIVE AND OBJECTIVE BOX
EP ATTENDING    tele: NSR, no events    no palpitations, no syncope, no angina    acetaminophen  IVPB .. 1000 milliGRAM(s) IV Intermittent once PRN  aspirin enteric coated 325 milliGRAM(s) Oral daily  atorvastatin 80 milliGRAM(s) Oral at bedtime  buDESOnide   0.25 milliGRAM(s) Respule 0.25 milliGRAM(s) Inhalation every 12 hours  clopidogrel Tablet 75 milliGRAM(s) Oral daily  dextrose 50% Injectable 50 milliLiter(s) IV Push every 15 minutes  dextrose 50% Injectable 25 milliLiter(s) IV Push every 15 minutes  docusate sodium 100 milliGRAM(s) Oral three times a day  enoxaparin Injectable 40 milliGRAM(s) SubCutaneous daily  HYDROmorphone   Tablet 2 milliGRAM(s) Oral every 3 hours PRN  HYDROmorphone   Tablet 4 milliGRAM(s) Oral every 3 hours PRN  insulin glargine Injectable (LANTUS) 24 Unit(s) SubCutaneous at bedtime  insulin Infusion 1 Unit(s)/Hr IV Continuous <Continuous>  insulin lispro Injectable (HumaLOG) 8 Unit(s) SubCutaneous before breakfast  insulin lispro Injectable (HumaLOG) 8 Unit(s) SubCutaneous before lunch  insulin lispro Injectable (HumaLOG) 8 Unit(s) SubCutaneous before dinner  insulin regular  human corrective regimen sliding scale   SubCutaneous Before meals and at bedtime  metoprolol tartrate 50 milliGRAM(s) Oral every 8 hours  pantoprazole    Tablet 40 milliGRAM(s) Oral before breakfast  sodium chloride 0.9%. 1000 milliLiter(s) IV Continuous <Continuous>                            10.8   9.2   )-----------( 232      ( 25 Oct 2018 05:27 )             32.8       10-26    138  |  102  |  11  ----------------------------<  110<H>  3.4<L>   |  24  |  0.95    Ca    8.7      26 Oct 2018 07:20      T(C): 37.2 (10-26-18 @ 05:53), Max: 37.7 (10-25-18 @ 21:57)  HR: 89 (10-26-18 @ 05:53) (89 - 102)  BP: 106/66 (10-26-18 @ 05:53) (96/57 - 119/70)  RR: 18 (10-26-18 @ 05:53) (18 - 18)  SpO2: 91% (10-26-18 @ 05:53) (91% - 95%)  Wt(kg): --    no JVD  RRR, no murmurs  CTAB  soft nt/nd  no c/c/e    cath: TVD  echo: LVEF 48%    A/P) 57 y/o male PMH DM admitted with subjective palpitations in setting of chest pain. Found to have TVD requiring CABG. EP called for palpitations in setting of underlying TVD, but EKG/tele have all been sinus. Now s/p CABG and ASD closure doing well    -if tele remains unremarkable post-op will recommend outpatient event monitoring  -will cont to monitor tele to r/o ventricular arrythmias given palpitations  -no further inpatient EP workup needed  -supportive care as per CTS  -f/u with Giovani on Friday 11/2 at 1pm

## 2018-10-27 ENCOUNTER — TRANSCRIPTION ENCOUNTER (OUTPATIENT)
Age: 58
End: 2018-10-27

## 2018-10-27 VITALS — WEIGHT: 164.02 LBS

## 2018-10-27 LAB
ANION GAP SERPL CALC-SCNC: 15 MMOL/L — SIGNIFICANT CHANGE UP (ref 5–17)
BUN SERPL-MCNC: 12 MG/DL — SIGNIFICANT CHANGE UP (ref 7–23)
CALCIUM SERPL-MCNC: 9.1 MG/DL — SIGNIFICANT CHANGE UP (ref 8.4–10.5)
CHLORIDE SERPL-SCNC: 101 MMOL/L — SIGNIFICANT CHANGE UP (ref 96–108)
CO2 SERPL-SCNC: 23 MMOL/L — SIGNIFICANT CHANGE UP (ref 22–31)
CREAT SERPL-MCNC: 0.99 MG/DL — SIGNIFICANT CHANGE UP (ref 0.5–1.3)
GLUCOSE SERPL-MCNC: 101 MG/DL — HIGH (ref 70–99)
HCT VFR BLD CALC: 28.9 % — LOW (ref 39–50)
HGB BLD-MCNC: 10.2 G/DL — LOW (ref 13–17)
MCHC RBC-ENTMCNC: 32 PG — SIGNIFICANT CHANGE UP (ref 27–34)
MCHC RBC-ENTMCNC: 35.3 GM/DL — SIGNIFICANT CHANGE UP (ref 32–36)
MCV RBC AUTO: 90.7 FL — SIGNIFICANT CHANGE UP (ref 80–100)
PLATELET # BLD AUTO: 369 K/UL — SIGNIFICANT CHANGE UP (ref 150–400)
POTASSIUM SERPL-MCNC: 3.6 MMOL/L — SIGNIFICANT CHANGE UP (ref 3.5–5.3)
POTASSIUM SERPL-SCNC: 3.6 MMOL/L — SIGNIFICANT CHANGE UP (ref 3.5–5.3)
RBC # BLD: 3.18 M/UL — LOW (ref 4.2–5.8)
RBC # FLD: 11.9 % — SIGNIFICANT CHANGE UP (ref 10.3–14.5)
SODIUM SERPL-SCNC: 139 MMOL/L — SIGNIFICANT CHANGE UP (ref 135–145)
WBC # BLD: 7.6 K/UL — SIGNIFICANT CHANGE UP (ref 3.8–10.5)
WBC # FLD AUTO: 7.6 K/UL — SIGNIFICANT CHANGE UP (ref 3.8–10.5)

## 2018-10-27 PROCEDURE — 85027 COMPLETE CBC AUTOMATED: CPT

## 2018-10-27 PROCEDURE — 86901 BLOOD TYPING SEROLOGIC RH(D): CPT

## 2018-10-27 PROCEDURE — 80048 BASIC METABOLIC PNL TOTAL CA: CPT

## 2018-10-27 PROCEDURE — 84295 ASSAY OF SERUM SODIUM: CPT

## 2018-10-27 PROCEDURE — 94010 BREATHING CAPACITY TEST: CPT

## 2018-10-27 PROCEDURE — 85014 HEMATOCRIT: CPT

## 2018-10-27 PROCEDURE — 71046 X-RAY EXAM CHEST 2 VIEWS: CPT

## 2018-10-27 PROCEDURE — 86900 BLOOD TYPING SEROLOGIC ABO: CPT

## 2018-10-27 PROCEDURE — 82962 GLUCOSE BLOOD TEST: CPT

## 2018-10-27 PROCEDURE — 31720 CLEARANCE OF AIRWAYS: CPT

## 2018-10-27 PROCEDURE — 97116 GAIT TRAINING THERAPY: CPT

## 2018-10-27 PROCEDURE — 84100 ASSAY OF PHOSPHORUS: CPT

## 2018-10-27 PROCEDURE — 86923 COMPATIBILITY TEST ELECTRIC: CPT

## 2018-10-27 PROCEDURE — 82550 ASSAY OF CK (CPK): CPT

## 2018-10-27 PROCEDURE — P9016: CPT

## 2018-10-27 PROCEDURE — 82553 CREATINE MB FRACTION: CPT

## 2018-10-27 PROCEDURE — 82435 ASSAY OF BLOOD CHLORIDE: CPT

## 2018-10-27 PROCEDURE — 94002 VENT MGMT INPAT INIT DAY: CPT

## 2018-10-27 PROCEDURE — C1751: CPT

## 2018-10-27 PROCEDURE — 84484 ASSAY OF TROPONIN QUANT: CPT

## 2018-10-27 PROCEDURE — 84436 ASSAY OF TOTAL THYROXINE: CPT

## 2018-10-27 PROCEDURE — 93005 ELECTROCARDIOGRAM TRACING: CPT

## 2018-10-27 PROCEDURE — P9047: CPT

## 2018-10-27 PROCEDURE — P9045: CPT

## 2018-10-27 PROCEDURE — 81001 URINALYSIS AUTO W/SCOPE: CPT

## 2018-10-27 PROCEDURE — 84439 ASSAY OF FREE THYROXINE: CPT

## 2018-10-27 PROCEDURE — 82803 BLOOD GASES ANY COMBINATION: CPT

## 2018-10-27 PROCEDURE — C1769: CPT

## 2018-10-27 PROCEDURE — 84443 ASSAY THYROID STIM HORMONE: CPT

## 2018-10-27 PROCEDURE — 85610 PROTHROMBIN TIME: CPT

## 2018-10-27 PROCEDURE — 86850 RBC ANTIBODY SCREEN: CPT

## 2018-10-27 PROCEDURE — 93306 TTE W/DOPPLER COMPLETE: CPT

## 2018-10-27 PROCEDURE — 82330 ASSAY OF CALCIUM: CPT

## 2018-10-27 PROCEDURE — 82947 ASSAY GLUCOSE BLOOD QUANT: CPT

## 2018-10-27 PROCEDURE — 71045 X-RAY EXAM CHEST 1 VIEW: CPT

## 2018-10-27 PROCEDURE — 83880 ASSAY OF NATRIURETIC PEPTIDE: CPT

## 2018-10-27 PROCEDURE — 87640 STAPH A DNA AMP PROBE: CPT

## 2018-10-27 PROCEDURE — 83605 ASSAY OF LACTIC ACID: CPT

## 2018-10-27 PROCEDURE — 97161 PT EVAL LOW COMPLEX 20 MIN: CPT

## 2018-10-27 PROCEDURE — 83735 ASSAY OF MAGNESIUM: CPT

## 2018-10-27 PROCEDURE — 93880 EXTRACRANIAL BILAT STUDY: CPT

## 2018-10-27 PROCEDURE — C1889: CPT

## 2018-10-27 PROCEDURE — 94640 AIRWAY INHALATION TREATMENT: CPT

## 2018-10-27 PROCEDURE — 84132 ASSAY OF SERUM POTASSIUM: CPT

## 2018-10-27 PROCEDURE — 73030 X-RAY EXAM OF SHOULDER: CPT

## 2018-10-27 PROCEDURE — 84550 ASSAY OF BLOOD/URIC ACID: CPT

## 2018-10-27 PROCEDURE — 85730 THROMBOPLASTIN TIME PARTIAL: CPT

## 2018-10-27 PROCEDURE — 84480 ASSAY TRIIODOTHYRONINE (T3): CPT

## 2018-10-27 PROCEDURE — 87641 MR-STAPH DNA AMP PROBE: CPT

## 2018-10-27 PROCEDURE — 86891 AUTOLOGOUS BLOOD OP SALVAGE: CPT

## 2018-10-27 PROCEDURE — 80053 COMPREHEN METABOLIC PANEL: CPT

## 2018-10-27 RX ORDER — DOCUSATE SODIUM 100 MG
1 CAPSULE ORAL
Qty: 90 | Refills: 0
Start: 2018-10-27 | End: 2018-11-25

## 2018-10-27 RX ORDER — ENOXAPARIN SODIUM 100 MG/ML
24 INJECTION SUBCUTANEOUS
Qty: 1 | Refills: 0
Start: 2018-10-27 | End: 2018-11-25

## 2018-10-27 RX ORDER — ATORVASTATIN CALCIUM 80 MG/1
1 TABLET, FILM COATED ORAL
Qty: 30 | Refills: 0
Start: 2018-10-27 | End: 2018-11-25

## 2018-10-27 RX ORDER — GLIMEPIRIDE 1 MG
1 TABLET ORAL
Qty: 0 | Refills: 0 | COMMUNITY

## 2018-10-27 RX ORDER — SITAGLIPTIN AND METFORMIN HYDROCHLORIDE 500; 50 MG/1; MG/1
1 TABLET, FILM COATED ORAL
Qty: 60 | Refills: 0
Start: 2018-10-27 | End: 2018-11-25

## 2018-10-27 RX ORDER — ATENOLOL 25 MG/1
50 TABLET ORAL EVERY 12 HOURS
Qty: 0 | Refills: 0 | Status: DISCONTINUED | OUTPATIENT
Start: 2018-10-27 | End: 2018-10-27

## 2018-10-27 RX ORDER — PANTOPRAZOLE SODIUM 20 MG/1
1 TABLET, DELAYED RELEASE ORAL
Qty: 30 | Refills: 0
Start: 2018-10-27 | End: 2018-11-25

## 2018-10-27 RX ORDER — ATENOLOL 25 MG/1
1 TABLET ORAL
Qty: 60 | Refills: 0
Start: 2018-10-27 | End: 2018-11-25

## 2018-10-27 RX ORDER — CLOPIDOGREL BISULFATE 75 MG/1
1 TABLET, FILM COATED ORAL
Qty: 30 | Refills: 0
Start: 2018-10-27 | End: 2018-11-25

## 2018-10-27 RX ORDER — ASPIRIN/CALCIUM CARB/MAGNESIUM 324 MG
1 TABLET ORAL
Qty: 0 | Refills: 0 | COMMUNITY

## 2018-10-27 RX ORDER — ASPIRIN/CALCIUM CARB/MAGNESIUM 324 MG
1 TABLET ORAL
Qty: 30 | Refills: 0
Start: 2018-10-27 | End: 2018-11-25

## 2018-10-27 RX ADMIN — SPIRONOLACTONE 25 MILLIGRAM(S): 25 TABLET, FILM COATED ORAL at 05:23

## 2018-10-27 RX ADMIN — CLOPIDOGREL BISULFATE 75 MILLIGRAM(S): 75 TABLET, FILM COATED ORAL at 11:35

## 2018-10-27 RX ADMIN — Medication 40 MILLIGRAM(S): at 05:23

## 2018-10-27 RX ADMIN — Medication 325 MILLIGRAM(S): at 11:34

## 2018-10-27 RX ADMIN — Medication 8 UNIT(S): at 08:10

## 2018-10-27 RX ADMIN — Medication 8 UNIT(S): at 12:22

## 2018-10-27 RX ADMIN — Medication 0.25 MILLIGRAM(S): at 05:23

## 2018-10-27 RX ADMIN — PANTOPRAZOLE SODIUM 40 MILLIGRAM(S): 20 TABLET, DELAYED RELEASE ORAL at 05:23

## 2018-10-27 RX ADMIN — Medication 75 MILLIGRAM(S): at 05:23

## 2018-10-27 RX ADMIN — Medication 100 MILLIGRAM(S): at 05:23

## 2018-10-27 NOTE — DISCHARGE NOTE ADULT - PATIENT PORTAL LINK FT
You can access the [facility] Patient Portal, offered by [facility], by registering with the following website: [email or web address]

## 2018-10-27 NOTE — PROGRESS NOTE ADULT - SUBJECTIVE AND OBJECTIVE BOX
Endocrinology Attending Covering for Dr. Day      Chief complaint  Patient is a 58y old  Male who presents with a chief complaint of Cardiac Surgery evaluation (27 Oct 2018 06:57)   Review of systems  Patient in bed, looks comfortable, no fever,  had no hypoglycemia.    Labs and Fingersticks  CAPILLARY BLOOD GLUCOSE      POCT Blood Glucose.: 110 mg/dL (27 Oct 2018 07:54)  POCT Blood Glucose.: 68 mg/dL (26 Oct 2018 21:45)  POCT Blood Glucose.: 106 mg/dL (26 Oct 2018 16:31)  POCT Blood Glucose.: 101 mg/dL (26 Oct 2018 11:50)      Anion Gap, Serum: 15 (10-27 @ 06:45)  Anion Gap, Serum: 12 (10-26 @ 07:20)      Calcium, Total Serum: 9.1 (10-27 @ 06:45)  Calcium, Total Serum: 8.7 (10-26 @ 07:20)          10-27    139  |  101  |  12  ----------------------------<  101<H>  3.6   |  23  |  0.99    Ca    9.1      27 Oct 2018 06:45                          10.2   7.6   )-----------( 369      ( 27 Oct 2018 06:45 )             28.9     Medications  MEDICATIONS  (STANDING):  aspirin enteric coated 325 milliGRAM(s) Oral daily  ATENolol  Tablet 50 milliGRAM(s) Oral every 12 hours  atorvastatin 80 milliGRAM(s) Oral at bedtime  buDESOnide   0.25 milliGRAM(s) Respule 0.25 milliGRAM(s) Inhalation every 12 hours  clopidogrel Tablet 75 milliGRAM(s) Oral daily  dextrose 50% Injectable 50 milliLiter(s) IV Push every 15 minutes  dextrose 50% Injectable 25 milliLiter(s) IV Push every 15 minutes  docusate sodium 100 milliGRAM(s) Oral three times a day  enoxaparin Injectable 40 milliGRAM(s) SubCutaneous daily  insulin glargine Injectable (LANTUS) 24 Unit(s) SubCutaneous at bedtime  insulin Infusion 1 Unit(s)/Hr (1 mL/Hr) IV Continuous <Continuous>  insulin lispro Injectable (HumaLOG) 8 Unit(s) SubCutaneous before breakfast  insulin lispro Injectable (HumaLOG) 8 Unit(s) SubCutaneous before lunch  insulin lispro Injectable (HumaLOG) 8 Unit(s) SubCutaneous before dinner  insulin regular  human corrective regimen sliding scale   SubCutaneous Before meals and at bedtime  pantoprazole    Tablet 40 milliGRAM(s) Oral before breakfast  sodium chloride 0.9%. 1000 milliLiter(s) (10 mL/Hr) IV Continuous <Continuous>      Physical Exam  General: Patient comfortable in bed  Vital Signs Last 12 Hrs  T(F): 97.3 (10-27-18 @ 05:00), Max: 97.3 (10-27-18 @ 05:00)  HR: 78 (10-27-18 @ 05:00) (78 - 78)  BP: 107/67 (10-27-18 @ 05:00) (107/67 - 107/67)  BP(mean): --  RR: 18 (10-27-18 @ 05:00) (18 - 18)  SpO2: 100% (10-27-18 @ 05:00) (100% - 100%)  Neck: No palpable thyroid nodules.  CVS: S1S2, No murmurs  Respiratory: No wheezing, no crepitations  GI: Abdomen soft, bowel sounds positive  Musculoskeletal:  edema lower extremities.   Skin: No skin rashes, no ecchymosis    Diagnostics

## 2018-10-27 NOTE — DISCHARGE NOTE ADULT - MEDICATION SUMMARY - MEDICATIONS TO STOP TAKING
I will STOP taking the medications listed below when I get home from the hospital:    glimepiride 2 mg oral tablet  -- 1 tab(s) by mouth once a day    metoprolol  -- 12.5 milligram(s) by mouth 2 times a day

## 2018-10-27 NOTE — DISCHARGE NOTE ADULT - MEDICATION SUMMARY - MEDICATIONS TO TAKE
I will START or STAY ON the medications listed below when I get home from the hospital:    Ultra-Thin II (Short) Ins Syr (insulin syringe-needle u-100)   -- 1 each subcutaneous once a day (at bedtime)   -- Indication: For needles    lancets  -- 3 times a day   -- Indication: For lancets    glucometer  -- One touch glucometer  -- Indication: For glucometer    Glucometer strips  -- One touch glucometer strips  -- Indication: For glucometer strips    aspirin 325 mg oral delayed release tablet  -- 1 tab(s) by mouth once a day  -- Indication: For Antiplatelet    Janumet 50 mg-1000 mg oral tablet  -- 1 tab(s) by mouth 2 times a day   -- Do not drink alcoholic beverages when taking this medication.  Take with food or milk.    -- Indication: For Antidiabetic agent    Lantus Solostar Pen 100 units/mL subcutaneous solution  -- 24 unit(s) subcutaneous once a day (at bedtime)   -- Do not drink alcoholic beverages when taking this medication.  It is very important that you take or use this exactly as directed.  Do not skip doses or discontinue unless directed by your doctor.  Keep in refrigerator.  Do not freeze.    -- Indication: For antidiabetic agent    atorvastatin 80 mg oral tablet  -- 1 tab(s) by mouth once a day (at bedtime)  -- Indication: For Antihyperlipidemia    clopidogrel 75 mg oral tablet  -- 1 tab(s) by mouth once a day  -- Indication: For Antiplatelet    atenolol 50 mg oral tablet  -- 1 tab(s) by mouth every 12 hours  -- Indication: For Cardiovascular agent    docusate sodium 100 mg oral capsule  -- 1 cap(s) by mouth 3 times a day, As Needed   -- Indication: For stool softener as needed    pantoprazole 40 mg oral delayed release tablet  -- 1 tab(s) by mouth once a day (before a meal)  -- Indication: For Dyspepsia

## 2018-10-27 NOTE — DISCHARGE NOTE ADULT - CARE PLAN
Principal Discharge DX:	S/P CABG x 3  Goal:	Recovery  Assessment and plan of treatment:	Resume activity as tolerated  Resume low cholesterol low sodium diet  Shower daily and wash all incisions with soap and water  Ambulate at least four times daily  Use incentive spirometer every hour during the day  Record daily weight and report changes greater than 3lbs  Please follow up with your cardiologist in 7-10 days  Please follow up with Dr Baldwin in 7-10 days   Please follow up with endocrinologist/PMD regarding glucose control  Record and log finger sticks  Secondary Diagnosis:	ASD (atrial septal defect)

## 2018-10-27 NOTE — PROGRESS NOTE ADULT - ASSESSMENT
Assessment/Plan  DMT2: 58y Male with DM T2  day 5 post  surgery, on basal bolus insulin  Bs yesterday [phone]  and 110 this AM  Can  D/C home on Lantus to 24 units Qhs ,and  Janumet 50/1000 po BID  CAD: day 5 post-op on medications, stable, monitored.  HTN: Controlled, On med.  HLD: On statin      Case discussed with the NP  Dr Alford [phone]

## 2018-10-27 NOTE — DISCHARGE NOTE ADULT - CARE PROVIDER_API CALL
Rufus Baldwin), Surgery; Surgical Critical Care; Thoracic and Cardiac Surgery  [address] [place] [address]  Phone: [phone]  Fax: [phone]    Aroldo Green), Cardiac Electrophysiology; Cardiovascular Disease; Nuclear Cardiology  [address] [facility]  [address]  [place] [address]  Phone: [phone]  Fax: [phone]

## 2018-10-27 NOTE — DISCHARGE NOTE ADULT - OTHER SIGNIFICANT FINDINGS
VITAL SIGNS    Telemetry: SR 70-80   Vital Signs Last 24 Hrs  T(C): 36.3 (10-27-18 @ 05:00), Max: 37.2 (10-26-18 @ 20:00)  T(F): 97.3 (10-27-18 @ 05:00), Max: 99 (10-26-18 @ 20:00)  HR: 78 (10-27-18 @ 05:00) (78 - 93)  BP: 107/67 (10-27-18 @ 05:00) (95/60 - 142/76)  RR: 18 (10-27-18 @ 05:00) (18 - 18)  SpO2: 100% (10-27-18 @ 05:00) (93% - 100%)            10-26 @ 07:01  -  10-27 @ 07:00  --------------------------------------------------------  IN: 960 mL / OUT: 1880 mL / NET: -920 mL       Daily     Daily Weight in k.4 (27 Oct 2018 09:26)  Admit Wt: Drug Dosing Weight  Height (cm): 167.64 (18 Oct 2018 00:05)  Weight (kg): 72 (18 Oct 2018 00:05)  BMI (kg/m2): 25.6 (18 Oct 2018 00:05)  BSA (m2): 1.81 (18 Oct 2018 00:05)      CAPILLARY BLOOD GLUCOSE      POCT Blood Glucose.: 110 mg/dL (27 Oct 2018 07:54)  POCT Blood Glucose.: 68 mg/dL (26 Oct 2018 21:45)  POCT Blood Glucose.: 106 mg/dL (26 Oct 2018 16:31)  POCT Blood Glucose.: 101 mg/dL (26 Oct 2018 11:50)          MEDICATIONS  acetaminophen  IVPB .. 1000 milliGRAM(s) IV Intermittent once PRN  aspirin enteric coated 325 milliGRAM(s) Oral daily  ATENolol  Tablet 50 milliGRAM(s) Oral every 12 hours  atorvastatin 80 milliGRAM(s) Oral at bedtime  buDESOnide   0.25 milliGRAM(s) Respule 0.25 milliGRAM(s) Inhalation every 12 hours  clopidogrel Tablet 75 milliGRAM(s) Oral daily  dextrose 50% Injectable 50 milliLiter(s) IV Push every 15 minutes  dextrose 50% Injectable 25 milliLiter(s) IV Push every 15 minutes  docusate sodium 100 milliGRAM(s) Oral three times a day  enoxaparin Injectable 40 milliGRAM(s) SubCutaneous daily  HYDROmorphone   Tablet 2 milliGRAM(s) Oral every 3 hours PRN  HYDROmorphone   Tablet 4 milliGRAM(s) Oral every 3 hours PRN  insulin glargine Injectable (LANTUS) 24 Unit(s) SubCutaneous at bedtime  insulin Infusion 1 Unit(s)/Hr IV Continuous <Continuous>  insulin lispro Injectable (HumaLOG) 8 Unit(s) SubCutaneous before breakfast  insulin lispro Injectable (HumaLOG) 8 Unit(s) SubCutaneous before lunch  insulin lispro Injectable (HumaLOG) 8 Unit(s) SubCutaneous before dinner  insulin regular  human corrective regimen sliding scale   SubCutaneous Before meals and at bedtime  pantoprazole    Tablet 40 milliGRAM(s) Oral before breakfast  sodium chloride 0.9%. 1000 milliLiter(s) IV Continuous <Continuous>      PHYSICAL EXAM  Subjective: NAD  Neurology: alert and oriented x 3, nonfocal, no gross deficits  CV :S1S2  Sternal Wound :  CDI , Stable  Lungs: CTA b/l  Abdomen: soft, NT,ND, (+ )BM  :  voiding  Extremities:  -c/c/e     LABS  10-27    139  |  101  |  12  ----------------------------<  101<H>  3.6   |  23  |  0.99    Ca    9.1      27 Oct 2018 06:45                                   10.2   7.6   )-----------( 369      ( 27 Oct 2018 06:45 )             28.9                 PAST MEDICAL & SURGICAL HISTORY:  CAD (coronary artery disease)  Umbilical hernia  Diabetes mellitus type 2 in nonobese  Paraumbilical hernia

## 2018-10-27 NOTE — PROGRESS NOTE ADULT - ATTENDING COMMENTS
Patient seen and examined, agree with above assessment and plan as transcribed above.    - Extubated  progressing well    León Gibson MD, FACC
Agree with above assessment and plan as outlined above.    - Progressing well  - Chest tube management per CTS    León Gibson MD, FACC
Agree with above assessment and plan as outlined above.    - for CABG today    León Gibson MD, FACC
Agree with above assessment and plan as outlined above.    - for OR next week    León Gibson MD, FACC
Agree with above.   -CABG today per CTS    Linda Hoffman MD
Agree with above.   -continue with apt for cad  -follow up cts    Linda Hoffman MD
Agree with above.   -follow up CTS re: CABG    Linda Hoffman MD
EP ATTENDING    Patient seen and examined. Agree with above. Tele shows only normal sinus rhythm without events. Will continue to monitor tele given palpitations with underlying TVD. For CABG with Dr Baldwin on Monday. F/U echo.
Patient seen and examined, agree with above assessment and plan as transcribed above.    - F/U PA/LAt CXR  - Cont supportive care per CTS    León Gibson MD, FACC
agree with above.   cabg per cts next week    Linda Hoffman MD
EP ATTENDING    Agree with above. Called for palpitations and tachycardia (sinus) in setting of chest pain and abnormal NST implying RCA ischemia. Rhythms on tele/ekg are sinus tachycardia, and gated SPECT LVEF is normal. Cath showed TVD - awaiting CABG. Will continue to monitor tele r/o ventricular arrhythmias given palpitations and underlying TVD. F/U echo.
EP ATTENDING    Agree with above. Remains in sinus rhythm. For CABG tomorrow.
Patient seen and examined.  Agree with above.   -continue with apt for cad  -supportive care per ctu    Linda Hoffman MD
Patient seen and examined.  Agree with above.   -continue with dapt  -follow up cts    Linda Hoffman MD
EP ATTENDING    Agree with above. No further inpatient EP workup needed as long as tele remains benign. Recommend outpatient event monitoring given subjective palpitations.
EP ATTENDING    Agree with above. No further inpatient EP workup needed.

## 2018-10-27 NOTE — PROGRESS NOTE ADULT - PROBLEM SELECTOR PROBLEM 1
Coronary artery disease with other form of angina pectoris
Diabetes mellitus type 2 in nonobese
Coronary artery disease with other form of angina pectoris

## 2018-10-27 NOTE — PROGRESS NOTE ADULT - PROBLEM SELECTOR PROBLEM 2
CAD (coronary artery disease)
Diabetes mellitus type 2 in nonobese

## 2018-10-27 NOTE — DISCHARGE NOTE ADULT - HOME CARE AGENCY
[facility]  [phone]. Visiting nurse will call 1-2 days after discharge to arrange visit. Please call agency with any questions or concerns

## 2018-10-27 NOTE — PROGRESS NOTE ADULT - SUBJECTIVE AND OBJECTIVE BOX
Patient with no anginal chest pain or shortness of breath.  Ros otherwise negative.      MEDICATIONS  (STANDING):  aspirin enteric coated 325 milliGRAM(s) Oral daily  ATENolol  Tablet 50 milliGRAM(s) Oral every 12 hours  atorvastatin 80 milliGRAM(s) Oral at bedtime  buDESOnide   0.25 milliGRAM(s) Respule 0.25 milliGRAM(s) Inhalation every 12 hours  clopidogrel Tablet 75 milliGRAM(s) Oral daily  dextrose 50% Injectable 50 milliLiter(s) IV Push every 15 minutes  dextrose 50% Injectable 25 milliLiter(s) IV Push every 15 minutes  docusate sodium 100 milliGRAM(s) Oral three times a day  enoxaparin Injectable 40 milliGRAM(s) SubCutaneous daily  insulin glargine Injectable (LANTUS) 24 Unit(s) SubCutaneous at bedtime  insulin Infusion 1 Unit(s)/Hr (1 mL/Hr) IV Continuous <Continuous>  insulin lispro Injectable (HumaLOG) 8 Unit(s) SubCutaneous before breakfast  insulin lispro Injectable (HumaLOG) 8 Unit(s) SubCutaneous before lunch  insulin lispro Injectable (HumaLOG) 8 Unit(s) SubCutaneous before dinner  insulin regular  human corrective regimen sliding scale   SubCutaneous Before meals and at bedtime  pantoprazole    Tablet 40 milliGRAM(s) Oral before breakfast  sodium chloride 0.9%. 1000 milliLiter(s) (10 mL/Hr) IV Continuous <Continuous>    MEDICATIONS  (PRN):  acetaminophen  IVPB .. 1000 milliGRAM(s) IV Intermittent once PRN Mild Pain (1 - 3)  HYDROmorphone   Tablet 2 milliGRAM(s) Oral every 3 hours PRN Moderate Pain (4 - 6)  HYDROmorphone   Tablet 4 milliGRAM(s) Oral every 3 hours PRN Severe Pain (7 - 10)    LABS:                        10.2   7.6   )-----------( 369      ( 27 Oct 2018 06:45 )             28.9   139  |  101  |  12  ----------------------------<  101<H>  3.6   |  23  |  0.99    Ca    9.1      27 Oct 2018 06:45    Creatinine Trend: 0.99<--, 0.95<--, 0.89<--, 0.83<--, 0.88<--, 0.78<--     PHYSICAL EXAM  Vital Signs Last 24 Hrs  T(C): 36.3 (27 Oct 2018 05:00), Max: 37.2 (26 Oct 2018 20:00)  T(F): 97.3 (27 Oct 2018 05:00), Max: 99 (26 Oct 2018 20:00)  HR: 78 (27 Oct 2018 05:00) (78 - 93)  BP: 107/67 (27 Oct 2018 05:00) (95/60 - 142/76)  RR: 18 (27 Oct 2018 05:00) (18 - 18)  SpO2: 100% (27 Oct 2018 05:00) (93% - 100%)    Appearance: Normal	  HEENT:   Normal oral mucosa, PERRL, EOMI	  Lymphatic: No lymphadenopathy , no edema  Cardiovascular: Normal S1 S2, No JVD, No murmurs , Peripheral pulses palpable 2+ bilaterally  Respiratory: Lungs clear to auscultation, normal effort 	  Gastrointestinal:  Soft, Non-tender, + BS	    TELEMETRY: SR	      DIAGNOSTIC TESTING:  < from: Transthoracic Echocardiogram (10.18.18 @ 12:46) >  Conclusions:  1. Normal left ventricular internal dimensions and wall  thicknesses.  2. Mild global left ventricular systolic dysfunction with  mild segmental abnormalities.  Mild hypokinesis of the base  to mid anteroseptal, septal walls.  3. Normal diastolic function  4. Normal right ventricular size and function.  *** No previous Echo exam.    < end of copied text >     -Cath with severe stenosis in OM1, LAD (which was functionally significant by IFR of 0.89) and RCA      ASSESSMENT/PLAN: 	58y Male with tobacco abuse, fhx of cad admitted with unstable angina found to have multivessel CAD on cath, with preserved LV function, s/p C3L and  ASD closure on 10/22     - cont Asa/plavix/statin/Atenolol  -F/u Dr Matute 10/30 at 11:00AM  - D/C planning per CTS

## 2018-10-27 NOTE — PROGRESS NOTE ADULT - PROBLEM SELECTOR PLAN 2
On medications, monitored and followed up by CTS team
check HgBA1C and continue FS with replacement therapy.
glycemic control  endo consult appreciated  off insulin gtt  glucose fingersticks ac/hs  pre-meal insulin and coverage  HS lantus
glycemic control  endo consult appreciated  off insulin gtt  glucose fingersticks ac/hs  pre-meal insulin and coverage  HS lantus

## 2018-10-27 NOTE — DISCHARGE NOTE ADULT - PLAN OF CARE
Recovery Resume activity as tolerated  Resume low cholesterol low sodium diet  Shower daily and wash all incisions with soap and water  Ambulate at least four times daily  Use incentive spirometer every hour during the day  Record daily weight and report changes greater than 3lbs  Please follow up with your cardiologist in 7-10 days  Please follow up with Dr Baldwin in 7-10 days   Please follow up with endocrinologist/PMD regarding glucose control  Record and log finger sticks Resume activity as tolerated  Resume low cholesterol low sodium diet  Shower daily and wash all incisions with soap and water  Ambulate at least four times daily  Use incentive spirometer every hour during the day  Record daily weight and report changes greater than 3lbs  Please follow up with your cardiologist in 7-10 days  Please follow up with Dr Baldwin in 7-10 days   Please follow up with Endocrinologist/PMD regarding glucose control  Record and log finger sticks

## 2018-10-27 NOTE — PROGRESS NOTE ADULT - SUBJECTIVE AND OBJECTIVE BOX
pt seen and examined, no complaints, ROS - .     acetaminophen  IVPB .. 1000 milliGRAM(s) IV Intermittent once PRN  aspirin enteric coated 325 milliGRAM(s) Oral daily  atorvastatin 80 milliGRAM(s) Oral at bedtime  buDESOnide   0.25 milliGRAM(s) Respule 0.25 milliGRAM(s) Inhalation every 12 hours  clopidogrel Tablet 75 milliGRAM(s) Oral daily  dextrose 50% Injectable 50 milliLiter(s) IV Push every 15 minutes  dextrose 50% Injectable 25 milliLiter(s) IV Push every 15 minutes  docusate sodium 100 milliGRAM(s) Oral three times a day  enoxaparin Injectable 40 milliGRAM(s) SubCutaneous daily  furosemide    Tablet 40 milliGRAM(s) Oral daily  HYDROmorphone   Tablet 2 milliGRAM(s) Oral every 3 hours PRN  HYDROmorphone   Tablet 4 milliGRAM(s) Oral every 3 hours PRN  insulin glargine Injectable (LANTUS) 24 Unit(s) SubCutaneous at bedtime  insulin Infusion 1 Unit(s)/Hr IV Continuous <Continuous>  insulin lispro Injectable (HumaLOG) 8 Unit(s) SubCutaneous before breakfast  insulin lispro Injectable (HumaLOG) 8 Unit(s) SubCutaneous before lunch  insulin lispro Injectable (HumaLOG) 8 Unit(s) SubCutaneous before dinner  insulin regular  human corrective regimen sliding scale   SubCutaneous Before meals and at bedtime  metoprolol tartrate 75 milliGRAM(s) Oral three times a day  pantoprazole    Tablet 40 milliGRAM(s) Oral before breakfast  sodium chloride 0.9%. 1000 milliLiter(s) IV Continuous <Continuous>  spironolactone 25 milliGRAM(s) Oral daily                            9.8    8.7   )-----------( 317      ( 26 Oct 2018 07:21 )             29.2       Hemoglobin: 9.8 g/dL (10-26 @ 07:21)  Hemoglobin: 10.8 g/dL (10-25 @ 05:27)  Hemoglobin: 9.8 g/dL (10-24 @ 02:01)  Hemoglobin: 12.3 g/dL (10-23 @ 02:05)  Hemoglobin: 11.7 g/dL (10-22 @ 19:11)      10-26    x   |  x   |  x   ----------------------------<  x   4.0   |  x   |  x     Ca    8.7      26 Oct 2018 07:20      Creatinine Trend: 0.95<--, 0.89<--, 0.83<--, 0.88<--, 0.78<--, 0.83<--    COAGS:           T(C): 36.3 (10-27-18 @ 05:00), Max: 37.2 (10-26-18 @ 20:00)  HR: 78 (10-27-18 @ 05:00) (78 - 93)  BP: 107/67 (10-27-18 @ 05:00) (95/60 - 142/76)  RR: 18 (10-27-18 @ 05:00) (18 - 18)  SpO2: 100% (10-27-18 @ 05:00) (90% - 100%)  Wt(kg): --    I&O's Summary    25 Oct 2018 07:01  -  26 Oct 2018 07:00  --------------------------------------------------------  IN: 840 mL / OUT: 680 mL / NET: 160 mL    26 Oct 2018 07:01  -  27 Oct 2018 06:58  --------------------------------------------------------  IN: 960 mL / OUT: 1880 mL / NET: -920 mL        Appearance: Normal	  HEENT:   Normal oral mucosa, PERRL, EOMI	  Lymphatic: No lymphadenopathy , no edema  Cardiovascular: Normal S1 S2, No JVD, No murmurs , Peripheral pulses palpable 2+ bilaterally  Respiratory: Lungs clear to auscultation, normal effort 	  Gastrointestinal:  Soft, Non-tender, + BS	      TELEMETRY: SR	      DIAGNOSTIC TESTING:  [ ] Echocardiogram: < from: Transthoracic Echocardiogram (10.18.18 @ 12:46) >  Conclusions:  1. Normal left ventricular internal dimensions and wall  thicknesses.  2. Mild global left ventricular systolic dysfunction with  mild segmental abnormalities.  Mild hypokinesis of the base  to mid anteroseptal, septal walls.  3. Normal diastolic function  4. Normal right ventricular size and function.  *** No previous Echo exam.    < end of copied text >    [ ]  Catheterization:  -Cath with severe stenosis in OM1, LAD (which was functionally significant by IFR of 0.89) and RCA    [ ] Stress Test:  < from: Nuclear Stress Test-Exercise (10.17.18 @ 09:43) >  IMPRESSIONS:Abnormal Study  * Myocardial Perfusion SPECT results are abnormal at 90 %  of MPHR.  * There are medium sized, moderate defects in inferior and  inferoseptal walls that are reversible, suggestive of  ischemia.  * Post-stress gated wall motion analysis was performed  (LVEF = 52 %;LVEDV = 62 ml.), revealing mild hypokinesis  of the inferior wall. RV function appeared normal.  RV  function appeared normal.  *** No previous Nuclear/Stress exam.    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  s/p C3L , ASD closure      -  Dapt / statin    - glycemic control   - tolerating BB, increase as alisha and BP allows - tele stable   - pain management  - HD stable  - care per CTS  D/W Dr Gibson pt seen and examined, no complaints, ROS - .     acetaminophen  IVPB .. 1000 milliGRAM(s) IV Intermittent once PRN  aspirin enteric coated 325 milliGRAM(s) Oral daily  atorvastatin 80 milliGRAM(s) Oral at bedtime  buDESOnide   0.25 milliGRAM(s) Respule 0.25 milliGRAM(s) Inhalation every 12 hours  clopidogrel Tablet 75 milliGRAM(s) Oral daily  dextrose 50% Injectable 50 milliLiter(s) IV Push every 15 minutes  dextrose 50% Injectable 25 milliLiter(s) IV Push every 15 minutes  docusate sodium 100 milliGRAM(s) Oral three times a day  enoxaparin Injectable 40 milliGRAM(s) SubCutaneous daily  furosemide    Tablet 40 milliGRAM(s) Oral daily  HYDROmorphone   Tablet 2 milliGRAM(s) Oral every 3 hours PRN  HYDROmorphone   Tablet 4 milliGRAM(s) Oral every 3 hours PRN  insulin glargine Injectable (LANTUS) 24 Unit(s) SubCutaneous at bedtime  insulin Infusion 1 Unit(s)/Hr IV Continuous <Continuous>  insulin lispro Injectable (HumaLOG) 8 Unit(s) SubCutaneous before breakfast  insulin lispro Injectable (HumaLOG) 8 Unit(s) SubCutaneous before lunch  insulin lispro Injectable (HumaLOG) 8 Unit(s) SubCutaneous before dinner  insulin regular  human corrective regimen sliding scale   SubCutaneous Before meals and at bedtime  metoprolol tartrate 75 milliGRAM(s) Oral three times a day  pantoprazole    Tablet 40 milliGRAM(s) Oral before breakfast  sodium chloride 0.9%. 1000 milliLiter(s) IV Continuous <Continuous>  spironolactone 25 milliGRAM(s) Oral daily                            9.8    8.7   )-----------( 317      ( 26 Oct 2018 07:21 )             29.2       Hemoglobin: 9.8 g/dL (10-26 @ 07:21)  Hemoglobin: 10.8 g/dL (10-25 @ 05:27)  Hemoglobin: 9.8 g/dL (10-24 @ 02:01)  Hemoglobin: 12.3 g/dL (10-23 @ 02:05)  Hemoglobin: 11.7 g/dL (10-22 @ 19:11)      10-26    x   |  x   |  x   ----------------------------<  x   4.0   |  x   |  x     Ca    8.7      26 Oct 2018 07:20      Creatinine Trend: 0.95<--, 0.89<--, 0.83<--, 0.88<--, 0.78<--, 0.83<--    COAGS:           T(C): 36.3 (10-27-18 @ 05:00), Max: 37.2 (10-26-18 @ 20:00)  HR: 78 (10-27-18 @ 05:00) (78 - 93)  BP: 107/67 (10-27-18 @ 05:00) (95/60 - 142/76)  RR: 18 (10-27-18 @ 05:00) (18 - 18)  SpO2: 100% (10-27-18 @ 05:00) (90% - 100%)  Wt(kg): --    I&O's Summary    25 Oct 2018 07:01  -  26 Oct 2018 07:00  --------------------------------------------------------  IN: 840 mL / OUT: 680 mL / NET: 160 mL    26 Oct 2018 07:01  -  27 Oct 2018 06:58  --------------------------------------------------------  IN: 960 mL / OUT: 1880 mL / NET: -920 mL        Appearance: Normal	  HEENT:   Normal oral mucosa, PERRL, EOMI	  Lymphatic: No lymphadenopathy , no edema  Cardiovascular: Normal S1 S2, No JVD, No murmurs , Peripheral pulses palpable 2+ bilaterally  Respiratory: Lungs clear to auscultation, normal effort 	  Gastrointestinal:  Soft, Non-tender, + BS	      TELEMETRY: SR	      DIAGNOSTIC TESTING:  [ ] Echocardiogram: < from: Transthoracic Echocardiogram (10.18.18 @ 12:46) >  Conclusions:  1. Normal left ventricular internal dimensions and wall  thicknesses.  2. Mild global left ventricular systolic dysfunction with  mild segmental abnormalities.  Mild hypokinesis of the base  to mid anteroseptal, septal walls.  3. Normal diastolic function  4. Normal right ventricular size and function.  *** No previous Echo exam.    < end of copied text >    [ ]  Catheterization:  -Cath with severe stenosis in OM1, LAD (which was functionally significant by IFR of 0.89) and RCA    [ ] Stress Test:  < from: Nuclear Stress Test-Exercise (10.17.18 @ 09:43) >  IMPRESSIONS:Abnormal Study  * Myocardial Perfusion SPECT results are abnormal at 90 %  of MPHR.  * There are medium sized, moderate defects in inferior and  inferoseptal walls that are reversible, suggestive of  ischemia.  * Post-stress gated wall motion analysis was performed  (LVEF = 52 %;LVEDV = 62 ml.), revealing mild hypokinesis  of the inferior wall. RV function appeared normal.  RV  function appeared normal.  *** No previous Nuclear/Stress exam.    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  s/p C3L , ASD closure      -  Dapt / statin    - glycemic control   - tolerating BB, increase as alisha and BP allows - tele stable   - pain management  - HD stable  - care per CTS

## 2018-10-27 NOTE — PROVIDER CONTACT NOTE (MEDICATION) - SITUATION
Patient blood sugar at HS was 68mg/dl, patient refused Lantus insulin, BP 95/60, 75MG Metoprolol PO schedule.

## 2018-10-27 NOTE — DISCHARGE NOTE ADULT - HOSPITAL COURSE
58 year old male current smoker with family history of CAD with DM-II who presented to [facility] ED 10/17 complaining of chest pain/pressure with associated left arm numbness/weakness.  Denies dizziness, syncope, edema, orthopnea and PND.  CT head was performed showing no CVA and neurology was consulted who thought his symptoms were secondary to neuropathy.  Underwent a Cardiac work up, R/O MI, underwent Nuclear stress test revealing EF 52%, medium sized, moderate defects in inferior and inferoseptal walls that are reversible, suggestive of ischemia.   In light of patients cardiac risk factors, symptoms and abnormal noninvasive test findings there is high suspicion for CAD. Still have left shoulder pain with numbness left hand thumb and index finger.   10/22: Atrial septal defect closure, CABGx3 (LIMA-LAD, SVG-OM, SVG-Diag)  10/23: care per [facility]ICU, weaning off vaso pressors, dobutamine gtt weaned off, remains on insulin gtt for glycemic control, endo consult appreciated  10/24: transferred to Stepdown, on high flow nasal cannula, remains hemodynamically stable, no arrhythmias NSR.     10/25/18 - BB increased to 50mg Lopressor q8h as per julianna Iglesias removed w/o incident.  pt to transfer to floor   d/c planning  10/27 D/C home

## 2018-10-27 NOTE — PROGRESS NOTE ADULT - SUBJECTIVE AND OBJECTIVE BOX
S: no chest pain or sob       acetaminophen  IVPB .. 1000 milliGRAM(s) IV Intermittent once PRN  aspirin enteric coated 325 milliGRAM(s) Oral daily  ATENolol  Tablet 50 milliGRAM(s) Oral every 12 hours  atorvastatin 80 milliGRAM(s) Oral at bedtime  buDESOnide   0.25 milliGRAM(s) Respule 0.25 milliGRAM(s) Inhalation every 12 hours  clopidogrel Tablet 75 milliGRAM(s) Oral daily  dextrose 50% Injectable 50 milliLiter(s) IV Push every 15 minutes  dextrose 50% Injectable 25 milliLiter(s) IV Push every 15 minutes  docusate sodium 100 milliGRAM(s) Oral three times a day  enoxaparin Injectable 40 milliGRAM(s) SubCutaneous daily  HYDROmorphone   Tablet 2 milliGRAM(s) Oral every 3 hours PRN  HYDROmorphone   Tablet 4 milliGRAM(s) Oral every 3 hours PRN  insulin glargine Injectable (LANTUS) 24 Unit(s) SubCutaneous at bedtime  insulin Infusion 1 Unit(s)/Hr IV Continuous <Continuous>  insulin lispro Injectable (HumaLOG) 8 Unit(s) SubCutaneous before breakfast  insulin lispro Injectable (HumaLOG) 8 Unit(s) SubCutaneous before lunch  insulin lispro Injectable (HumaLOG) 8 Unit(s) SubCutaneous before dinner  insulin regular  human corrective regimen sliding scale   SubCutaneous Before meals and at bedtime  pantoprazole    Tablet 40 milliGRAM(s) Oral before breakfast  sodium chloride 0.9%. 1000 milliLiter(s) IV Continuous <Continuous>                            10.2   7.6   )-----------( 369      ( 27 Oct 2018 06:45 )             28.9       10-27    139  |  101  |  12  ----------------------------<  101<H>  3.6   |  23  |  0.99    Ca    9.1      27 Oct 2018 06:45              T(C): 36.3 (10-27-18 @ 05:00), Max: 37.2 (10-26-18 @ 20:00)  HR: 78 (10-27-18 @ 05:00) (78 - 93)  BP: 107/67 (10-27-18 @ 05:00) (95/60 - 142/76)  RR: 18 (10-27-18 @ 05:00) (18 - 18)  SpO2: 100% (10-27-18 @ 05:00) (93% - 100%)  Wt(kg): --    I&O's Summary    26 Oct 2018 07:01  -  27 Oct 2018 07:00  --------------------------------------------------------  IN: 960 mL / OUT: 1880 mL / NET: -920 mL            Appearance: Normal	  HEENT:   Normal oral mucosa, PERRL, EOMI	  Lymphatic: No lymphadenopathy , no edema  Cardiovascular: Normal S1 S2, No JVD, No murmurs , Peripheral pulses palpable 2+ bilaterally  Respiratory: Lungs clear to auscultation, normal effort 	  Gastrointestinal:  Soft, Non-tender, + BS	      TELEMETRY: SR	      DIAGNOSTIC TESTING:  [ ] Echocardiogram: < from: Transthoracic Echocardiogram (10.18.18 @ 12:46) >  Conclusions:  1. Normal left ventricular internal dimensions and wall  thicknesses.  2. Mild global left ventricular systolic dysfunction with  mild segmental abnormalities.  Mild hypokinesis of the base  to mid anteroseptal, septal walls.  3. Normal diastolic function  4. Normal right ventricular size and function.  *** No previous Echo exam.    < end of copied text >    [ ]  Catheterization:  -Cath with severe stenosis in OM1, LAD (which was functionally significant by IFR of 0.89) and RCA    [ ] Stress Test:  < from: Nuclear Stress Test-Exercise (10.17.18 @ 09:43) >  IMPRESSIONS:Abnormal Study  * Myocardial Perfusion SPECT results are abnormal at 90 %  of MPHR.  * There are medium sized, moderate defects in inferior and  inferoseptal walls that are reversible, suggestive of  ischemia.  * Post-stress gated wall motion analysis was performed  (LVEF = 52 %;LVEDV = 62 ml.), revealing mild hypokinesis  of the inferior wall. RV function appeared normal.  RV  function appeared normal.  *** No previous Nuclear/Stress exam.    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  s/p C3L , ASD closure      -continue with dapt and statin for calcified cad  -pt. with no anginal symptoms or clinical heart failure  -no further interventional workup needed at this time  -dc planning per primary team  -pt. to follow up with Dr. Matute at [address] [place] [address] [phone] on 10/30/18 at 11:00 am    Linda Hoffman MD

## 2018-10-29 ENCOUNTER — APPOINTMENT (OUTPATIENT)
Age: 58
End: 2018-10-29
Payer: COMMERCIAL

## 2018-10-29 PROCEDURE — 99024 POSTOP FOLLOW-UP VISIT: CPT

## 2018-10-29 RX ORDER — CLOPIDOGREL 75 MG/1
75 TABLET, FILM COATED ORAL DAILY
Refills: 0 | Status: ACTIVE | COMMUNITY
Start: 2018-10-29

## 2018-10-29 RX ORDER — ATORVASTATIN CALCIUM 80 MG/1
80 TABLET, FILM COATED ORAL
Refills: 0 | Status: ACTIVE | COMMUNITY
Start: 2018-10-29

## 2018-10-29 RX ORDER — INSULIN GLARGINE 100 [IU]/ML
100 INJECTION, SOLUTION SUBCUTANEOUS
Refills: 0 | Status: ACTIVE | COMMUNITY
Start: 2018-10-29

## 2018-10-29 RX ORDER — ASPIRIN 325 MG/1
325 TABLET ORAL DAILY
Refills: 0 | Status: ACTIVE | COMMUNITY
Start: 2018-10-29

## 2018-11-07 RX ORDER — DOCUSATE SODIUM 100 MG/1
100 CAPSULE, LIQUID FILLED ORAL
Qty: 90 | Refills: 3 | Status: ACTIVE | COMMUNITY

## 2018-11-08 ENCOUNTER — APPOINTMENT (OUTPATIENT)
Dept: CARDIOTHORACIC SURGERY | Facility: CLINIC | Age: 58
End: 2018-11-08
Payer: COMMERCIAL

## 2018-11-08 VITALS
OXYGEN SATURATION: 98 % | WEIGHT: 157.2 LBS | HEART RATE: 72 BPM | SYSTOLIC BLOOD PRESSURE: 118 MMHG | HEIGHT: 66 IN | RESPIRATION RATE: 12 BRPM | TEMPERATURE: 97.8 F | DIASTOLIC BLOOD PRESSURE: 75 MMHG | BODY MASS INDEX: 25.26 KG/M2

## 2018-11-08 DIAGNOSIS — Q21.1 ATRIAL SEPTAL DEFECT: ICD-10-CM

## 2018-11-08 DIAGNOSIS — Z95.1 PRESENCE OF AORTOCORONARY BYPASS GRAFT: ICD-10-CM

## 2018-11-08 PROCEDURE — 99024 POSTOP FOLLOW-UP VISIT: CPT

## 2018-11-08 RX ORDER — SITAGLIPTIN AND METFORMIN HYDROCHLORIDE 50; 1000 MG/1; MG/1
50-1000 TABLET, FILM COATED ORAL
Qty: 60 | Refills: 0 | Status: COMPLETED | COMMUNITY
Start: 2018-10-27

## 2018-11-08 RX ORDER — ATENOLOL 100 MG/1
100 TABLET ORAL DAILY
Refills: 0 | Status: ACTIVE | COMMUNITY
Start: 2018-10-29

## 2018-11-08 RX ORDER — OMEPRAZOLE MAGNESIUM 20 MG/1
20 TABLET, DELAYED RELEASE ORAL DAILY
Refills: 0 | Status: ACTIVE | COMMUNITY
Start: 2018-11-08

## 2018-11-08 RX ORDER — PEN NEEDLE, DIABETIC 29 G X1/2"
31G X 8 MM NEEDLE, DISPOSABLE MISCELLANEOUS
Qty: 100 | Refills: 0 | Status: DISCONTINUED | COMMUNITY
Start: 2018-10-27

## 2018-11-08 RX ORDER — BLOOD-GLUCOSE METER
W/DEVICE EACH MISCELLANEOUS
Qty: 1 | Refills: 0 | Status: DISCONTINUED | COMMUNITY
Start: 2018-10-27

## 2018-11-08 RX ORDER — HYDROCORTISONE 25 MG/G
2.5 CREAM TOPICAL
Qty: 28 | Refills: 0 | Status: DISCONTINUED | COMMUNITY
Start: 2018-07-11

## 2018-11-08 RX ORDER — INSULIN GLARGINE 100 [IU]/ML
100 INJECTION, SOLUTION SUBCUTANEOUS
Qty: 15 | Refills: 0 | Status: ACTIVE | COMMUNITY
Start: 2018-10-27

## 2018-11-08 RX ORDER — GLIMEPIRIDE 2 MG/1
2 TABLET ORAL
Qty: 30 | Refills: 0 | Status: ACTIVE | COMMUNITY
Start: 2018-11-02

## 2018-11-08 RX ORDER — SITAGLIPTIN AND METFORMIN HYDROCHLORIDE 50; 1000 MG/1; MG/1
50-1000 TABLET, FILM COATED, EXTENDED RELEASE ORAL TWICE DAILY
Refills: 0 | Status: COMPLETED | COMMUNITY
Start: 2018-10-29 | End: 2018-11-08

## 2018-11-08 RX ORDER — LIDOCAINE 5 G/100G
5 OINTMENT TOPICAL
Qty: 35 | Refills: 0 | Status: DISCONTINUED | COMMUNITY
Start: 2018-07-11

## 2018-11-08 RX ORDER — BLOOD SUGAR DIAGNOSTIC
STRIP MISCELLANEOUS
Qty: 100 | Refills: 0 | Status: DISCONTINUED | COMMUNITY
Start: 2018-10-27

## 2018-11-08 RX ORDER — IRON 18 MG
90 (18 FE) TABLET ORAL DAILY
Refills: 0 | Status: ACTIVE | COMMUNITY
Start: 2018-11-08

## 2018-11-08 RX ORDER — PANTOPRAZOLE SODIUM 40 MG/1
40 TABLET, DELAYED RELEASE ORAL DAILY
Qty: 30 | Refills: 0 | Status: COMPLETED | COMMUNITY
End: 2018-11-08

## 2018-11-08 RX ORDER — LANCETS 33 GAUGE
EACH MISCELLANEOUS
Qty: 100 | Refills: 0 | Status: DISCONTINUED | COMMUNITY
Start: 2018-10-27

## 2019-10-03 ENCOUNTER — INPATIENT (INPATIENT)
Facility: HOSPITAL | Age: 59
LOS: 0 days | Discharge: HOME | DRG: 558 | End: 2019-10-04
Attending: STUDENT IN AN ORGANIZED HEALTH CARE EDUCATION/TRAINING PROGRAM | Admitting: STUDENT IN AN ORGANIZED HEALTH CARE EDUCATION/TRAINING PROGRAM
Payer: COMMERCIAL

## 2019-10-03 VITALS
HEIGHT: 66 IN | HEART RATE: 95 BPM | WEIGHT: 175.05 LBS | OXYGEN SATURATION: 96 % | SYSTOLIC BLOOD PRESSURE: 180 MMHG | RESPIRATION RATE: 20 BRPM | DIASTOLIC BLOOD PRESSURE: 93 MMHG | TEMPERATURE: 98 F

## 2019-10-03 DIAGNOSIS — R07.9 CHEST PAIN, UNSPECIFIED: ICD-10-CM

## 2019-10-03 DIAGNOSIS — E03.9 HYPOTHYROIDISM, UNSPECIFIED: ICD-10-CM

## 2019-10-03 DIAGNOSIS — Z29.9 ENCOUNTER FOR PROPHYLACTIC MEASURES, UNSPECIFIED: ICD-10-CM

## 2019-10-03 DIAGNOSIS — Z95.1 PRESENCE OF AORTOCORONARY BYPASS GRAFT: Chronic | ICD-10-CM

## 2019-10-03 DIAGNOSIS — K42.9 UMBILICAL HERNIA WITHOUT OBSTRUCTION OR GANGRENE: Chronic | ICD-10-CM

## 2019-10-03 DIAGNOSIS — I25.10 ATHEROSCLEROTIC HEART DISEASE OF NATIVE CORONARY ARTERY WITHOUT ANGINA PECTORIS: ICD-10-CM

## 2019-10-03 DIAGNOSIS — E78.5 HYPERLIPIDEMIA, UNSPECIFIED: ICD-10-CM

## 2019-10-03 DIAGNOSIS — E11.9 TYPE 2 DIABETES MELLITUS WITHOUT COMPLICATIONS: ICD-10-CM

## 2019-10-03 LAB
ALBUMIN SERPL ELPH-MCNC: 4.9 G/DL — SIGNIFICANT CHANGE UP (ref 3.3–5)
ALP SERPL-CCNC: 99 U/L — SIGNIFICANT CHANGE UP (ref 40–120)
ALT FLD-CCNC: 36 U/L — SIGNIFICANT CHANGE UP (ref 10–45)
ANION GAP SERPL CALC-SCNC: 18 MMOL/L — HIGH (ref 5–17)
APTT BLD: 33.4 SEC — SIGNIFICANT CHANGE UP (ref 27.5–36.3)
AST SERPL-CCNC: 32 U/L — SIGNIFICANT CHANGE UP (ref 10–40)
BASOPHILS # BLD AUTO: 0.04 K/UL — SIGNIFICANT CHANGE UP (ref 0–0.2)
BASOPHILS NFR BLD AUTO: 0.6 % — SIGNIFICANT CHANGE UP (ref 0–2)
BILIRUB SERPL-MCNC: 0.5 MG/DL — SIGNIFICANT CHANGE UP (ref 0.2–1.2)
BUN SERPL-MCNC: 24 MG/DL — HIGH (ref 7–23)
CALCIUM SERPL-MCNC: 9.8 MG/DL — SIGNIFICANT CHANGE UP (ref 8.4–10.5)
CHLORIDE SERPL-SCNC: 96 MMOL/L — SIGNIFICANT CHANGE UP (ref 96–108)
CO2 SERPL-SCNC: 23 MMOL/L — SIGNIFICANT CHANGE UP (ref 22–31)
CREAT SERPL-MCNC: 1.03 MG/DL — SIGNIFICANT CHANGE UP (ref 0.5–1.3)
EOSINOPHIL # BLD AUTO: 0.17 K/UL — SIGNIFICANT CHANGE UP (ref 0–0.5)
EOSINOPHIL NFR BLD AUTO: 2.4 % — SIGNIFICANT CHANGE UP (ref 0–6)
GLUCOSE SERPL-MCNC: 327 MG/DL — HIGH (ref 70–99)
HCT VFR BLD CALC: 40.5 % — SIGNIFICANT CHANGE UP (ref 39–50)
HGB BLD-MCNC: 13.4 G/DL — SIGNIFICANT CHANGE UP (ref 13–17)
IMM GRANULOCYTES NFR BLD AUTO: 0.3 % — SIGNIFICANT CHANGE UP (ref 0–1.5)
INR BLD: 1.11 RATIO — SIGNIFICANT CHANGE UP (ref 0.88–1.16)
LYMPHOCYTES # BLD AUTO: 1.69 K/UL — SIGNIFICANT CHANGE UP (ref 1–3.3)
LYMPHOCYTES # BLD AUTO: 23.4 % — SIGNIFICANT CHANGE UP (ref 13–44)
MCHC RBC-ENTMCNC: 28.4 PG — SIGNIFICANT CHANGE UP (ref 27–34)
MCHC RBC-ENTMCNC: 33.1 GM/DL — SIGNIFICANT CHANGE UP (ref 32–36)
MCV RBC AUTO: 85.8 FL — SIGNIFICANT CHANGE UP (ref 80–100)
MONOCYTES # BLD AUTO: 0.77 K/UL — SIGNIFICANT CHANGE UP (ref 0–0.9)
MONOCYTES NFR BLD AUTO: 10.7 % — SIGNIFICANT CHANGE UP (ref 2–14)
NEUTROPHILS # BLD AUTO: 4.54 K/UL — SIGNIFICANT CHANGE UP (ref 1.8–7.4)
NEUTROPHILS NFR BLD AUTO: 62.6 % — SIGNIFICANT CHANGE UP (ref 43–77)
NRBC # BLD: 0 /100 WBCS — SIGNIFICANT CHANGE UP (ref 0–0)
PLATELET # BLD AUTO: 265 K/UL — SIGNIFICANT CHANGE UP (ref 150–400)
POTASSIUM SERPL-MCNC: 4.5 MMOL/L — SIGNIFICANT CHANGE UP (ref 3.5–5.3)
POTASSIUM SERPL-SCNC: 4.5 MMOL/L — SIGNIFICANT CHANGE UP (ref 3.5–5.3)
PROT SERPL-MCNC: 8 G/DL — SIGNIFICANT CHANGE UP (ref 6–8.3)
PROTHROM AB SERPL-ACNC: 12.8 SEC — SIGNIFICANT CHANGE UP (ref 10–12.9)
RBC # BLD: 4.72 M/UL — SIGNIFICANT CHANGE UP (ref 4.2–5.8)
RBC # FLD: 12.9 % — SIGNIFICANT CHANGE UP (ref 10.3–14.5)
SODIUM SERPL-SCNC: 137 MMOL/L — SIGNIFICANT CHANGE UP (ref 135–145)
TROPONIN T, HIGH SENSITIVITY RESULT: 14 NG/L — SIGNIFICANT CHANGE UP (ref 0–51)
TROPONIN T, HIGH SENSITIVITY RESULT: 20 NG/L — SIGNIFICANT CHANGE UP (ref 0–51)
WBC # BLD: 7.23 K/UL — SIGNIFICANT CHANGE UP (ref 3.8–10.5)
WBC # FLD AUTO: 7.23 K/UL — SIGNIFICANT CHANGE UP (ref 3.8–10.5)

## 2019-10-03 PROCEDURE — 73030 X-RAY EXAM OF SHOULDER: CPT | Mod: 26,RT

## 2019-10-03 PROCEDURE — 71275 CT ANGIOGRAPHY CHEST: CPT | Mod: 26

## 2019-10-03 PROCEDURE — 99285 EMERGENCY DEPT VISIT HI MDM: CPT | Mod: 25

## 2019-10-03 PROCEDURE — 74174 CTA ABD&PLVS W/CONTRAST: CPT | Mod: 26

## 2019-10-03 PROCEDURE — 71045 X-RAY EXAM CHEST 1 VIEW: CPT | Mod: 26

## 2019-10-03 PROCEDURE — 93010 ELECTROCARDIOGRAM REPORT: CPT

## 2019-10-03 RX ORDER — LEVOTHYROXINE SODIUM 125 MCG
25 TABLET ORAL DAILY
Refills: 0 | Status: DISCONTINUED | OUTPATIENT
Start: 2019-10-03 | End: 2019-10-04

## 2019-10-03 RX ORDER — MORPHINE SULFATE 50 MG/1
2 CAPSULE, EXTENDED RELEASE ORAL ONCE
Refills: 0 | Status: DISCONTINUED | OUTPATIENT
Start: 2019-10-03 | End: 2019-10-03

## 2019-10-03 RX ORDER — DEXTROSE 50 % IN WATER 50 %
15 SYRINGE (ML) INTRAVENOUS ONCE
Refills: 0 | Status: DISCONTINUED | OUTPATIENT
Start: 2019-10-03 | End: 2019-10-04

## 2019-10-03 RX ORDER — DEXTROSE 50 % IN WATER 50 %
12.5 SYRINGE (ML) INTRAVENOUS ONCE
Refills: 0 | Status: DISCONTINUED | OUTPATIENT
Start: 2019-10-03 | End: 2019-10-04

## 2019-10-03 RX ORDER — SODIUM CHLORIDE 9 MG/ML
1000 INJECTION, SOLUTION INTRAVENOUS
Refills: 0 | Status: DISCONTINUED | OUTPATIENT
Start: 2019-10-03 | End: 2019-10-04

## 2019-10-03 RX ORDER — LEVOTHYROXINE SODIUM 125 MCG
1 TABLET ORAL
Qty: 0 | Refills: 0 | DISCHARGE

## 2019-10-03 RX ORDER — ACETAMINOPHEN 500 MG
650 TABLET ORAL EVERY 6 HOURS
Refills: 0 | Status: DISCONTINUED | OUTPATIENT
Start: 2019-10-03 | End: 2019-10-04

## 2019-10-03 RX ORDER — GLUCAGON INJECTION, SOLUTION 0.5 MG/.1ML
1 INJECTION, SOLUTION SUBCUTANEOUS ONCE
Refills: 0 | Status: DISCONTINUED | OUTPATIENT
Start: 2019-10-03 | End: 2019-10-04

## 2019-10-03 RX ORDER — INSULIN LISPRO 100/ML
VIAL (ML) SUBCUTANEOUS
Refills: 0 | Status: DISCONTINUED | OUTPATIENT
Start: 2019-10-03 | End: 2019-10-04

## 2019-10-03 RX ORDER — TRAMADOL HYDROCHLORIDE 50 MG/1
25 TABLET ORAL ONCE
Refills: 0 | Status: DISCONTINUED | OUTPATIENT
Start: 2019-10-03 | End: 2019-10-03

## 2019-10-03 RX ORDER — LIDOCAINE 4 G/100G
1 CREAM TOPICAL DAILY
Refills: 0 | Status: DISCONTINUED | OUTPATIENT
Start: 2019-10-03 | End: 2019-10-04

## 2019-10-03 RX ORDER — GLIMEPIRIDE 1 MG
1 TABLET ORAL
Qty: 0 | Refills: 0 | DISCHARGE

## 2019-10-03 RX ORDER — ATENOLOL 25 MG/1
50 TABLET ORAL EVERY 12 HOURS
Refills: 0 | Status: DISCONTINUED | OUTPATIENT
Start: 2019-10-03 | End: 2019-10-04

## 2019-10-03 RX ORDER — ASPIRIN/CALCIUM CARB/MAGNESIUM 324 MG
81 TABLET ORAL DAILY
Refills: 0 | Status: DISCONTINUED | OUTPATIENT
Start: 2019-10-03 | End: 2019-10-04

## 2019-10-03 RX ORDER — INFLUENZA VIRUS VACCINE 15; 15; 15; 15 UG/.5ML; UG/.5ML; UG/.5ML; UG/.5ML
0.5 SUSPENSION INTRAMUSCULAR ONCE
Refills: 0 | Status: DISCONTINUED | OUTPATIENT
Start: 2019-10-03 | End: 2019-10-04

## 2019-10-03 RX ORDER — ASPIRIN/CALCIUM CARB/MAGNESIUM 324 MG
1 TABLET ORAL
Qty: 0 | Refills: 0 | DISCHARGE

## 2019-10-03 RX ORDER — ATORVASTATIN CALCIUM 80 MG/1
80 TABLET, FILM COATED ORAL AT BEDTIME
Refills: 0 | Status: DISCONTINUED | OUTPATIENT
Start: 2019-10-03 | End: 2019-10-04

## 2019-10-03 RX ORDER — PANTOPRAZOLE SODIUM 20 MG/1
40 TABLET, DELAYED RELEASE ORAL
Refills: 0 | Status: DISCONTINUED | OUTPATIENT
Start: 2019-10-03 | End: 2019-10-04

## 2019-10-03 RX ORDER — SODIUM CHLORIDE 9 MG/ML
1000 INJECTION INTRAMUSCULAR; INTRAVENOUS; SUBCUTANEOUS
Refills: 0 | Status: DISCONTINUED | OUTPATIENT
Start: 2019-10-03 | End: 2019-10-03

## 2019-10-03 RX ORDER — MORPHINE SULFATE 50 MG/1
1 CAPSULE, EXTENDED RELEASE ORAL ONCE
Refills: 0 | Status: DISCONTINUED | OUTPATIENT
Start: 2019-10-03 | End: 2019-10-03

## 2019-10-03 RX ORDER — PIOGLITAZONE HYDROCHLORIDE 15 MG/1
1 TABLET ORAL
Qty: 0 | Refills: 0 | DISCHARGE

## 2019-10-03 RX ORDER — OMEPRAZOLE 10 MG/1
1 CAPSULE, DELAYED RELEASE ORAL
Qty: 0 | Refills: 0 | DISCHARGE

## 2019-10-03 RX ADMIN — Medication 1: at 18:55

## 2019-10-03 RX ADMIN — Medication 650 MILLIGRAM(S): at 14:13

## 2019-10-03 RX ADMIN — ATORVASTATIN CALCIUM 80 MILLIGRAM(S): 80 TABLET, FILM COATED ORAL at 21:26

## 2019-10-03 RX ADMIN — MORPHINE SULFATE 2 MILLIGRAM(S): 50 CAPSULE, EXTENDED RELEASE ORAL at 19:25

## 2019-10-03 RX ADMIN — LIDOCAINE 1 PATCH: 4 CREAM TOPICAL at 19:53

## 2019-10-03 RX ADMIN — ATENOLOL 50 MILLIGRAM(S): 25 TABLET ORAL at 18:21

## 2019-10-03 RX ADMIN — MORPHINE SULFATE 2 MILLIGRAM(S): 50 CAPSULE, EXTENDED RELEASE ORAL at 07:21

## 2019-10-03 RX ADMIN — LIDOCAINE 1 PATCH: 4 CREAM TOPICAL at 14:13

## 2019-10-03 RX ADMIN — TRAMADOL HYDROCHLORIDE 25 MILLIGRAM(S): 50 TABLET ORAL at 21:56

## 2019-10-03 RX ADMIN — MORPHINE SULFATE 2 MILLIGRAM(S): 50 CAPSULE, EXTENDED RELEASE ORAL at 13:44

## 2019-10-03 RX ADMIN — TRAMADOL HYDROCHLORIDE 25 MILLIGRAM(S): 50 TABLET ORAL at 21:26

## 2019-10-03 RX ADMIN — MORPHINE SULFATE 2 MILLIGRAM(S): 50 CAPSULE, EXTENDED RELEASE ORAL at 18:55

## 2019-10-03 RX ADMIN — MORPHINE SULFATE 1 MILLIGRAM(S): 50 CAPSULE, EXTENDED RELEASE ORAL at 16:57

## 2019-10-03 RX ADMIN — Medication 650 MILLIGRAM(S): at 14:51

## 2019-10-03 RX ADMIN — MORPHINE SULFATE 1 MILLIGRAM(S): 50 CAPSULE, EXTENDED RELEASE ORAL at 16:27

## 2019-10-03 NOTE — H&P ADULT - NSHPPHYSICALEXAM_GEN_ALL_CORE
Vital Signs (24 Hrs):  T(C): 36.2 (10-03-19 @ 07:10), Max: 36.4 (10-03-19 @ 06:58)  HR: 77 (10-03-19 @ 08:57) (77 - 95)  BP: 160/98 (10-03-19 @ 08:57) (149/89 - 180/93)  RR: 16 (10-03-19 @ 08:57) (16 - 20)  SpO2: 97% (10-03-19 @ 08:57) (96% - 97%)  Wt(kg): --      PHYSICAL EXAM:  GENERAL: NAD, well-developed  HEAD:  Atraumatic, Normocephalic  EYES: EOMI, PERRLA, conjunctiva and sclera clear  NECK: Supple, No JVD  CHEST/LUNG: Clear to auscultation bilaterally; No wheeze  HEART: Regular rate and rhythm; No murmurs, rubs, or gallops  ABDOMEN: Soft, Nontender, Nondistended; Bowel sounds present  Neuro: AAOx3, no focal weakness, 5/5 b/l extremity strength  EXTREMITIES:  2+ Peripheral Pulses, No clubbing, cyanosis, or edema, reproducible ttp right scapula  SKIN: No rashes or lesions, sternum scar

## 2019-10-03 NOTE — ED PROVIDER NOTE - CLINICAL SUMMARY MEDICAL DECISION MAKING FREE TEXT BOX
58 yo M PMHx CAD, CABG x3 in 2018, DM2 p/w R sided neck/chest/arm/back pain, hypertensive on exam, DDx: ACS, dissection, will check labs, EKG, CXR, CTA chest/AP, admit

## 2019-10-03 NOTE — H&P ADULT - NSHPREVIEWOFSYSTEMS_GEN_ALL_CORE
General: no weakness, no fever/chills, no weight loss/gain  Skin/Breast: no rash, no jaundice  Ophthalmologic: no vision changes, no dry eyes   Respiratory and Thorax: no cough, no wheezing, no hemoptysis, no dyspnea  Cardiovascular: +chest pain, no shortness of breath, no orthopnea  Gastrointestinal: no n/v/d, no abdominal pain, no dysphagia   Genitourinary: no dysuria, no frequency, no nocturia, no hematuria  Musculoskeletal: no trauma, no sprain/strain, no myalgias, no arthralgias, no fracture, +right neck, scapula, upper back pain  Neurological: no HA, no dizziness, no weakness, no numbness  Psychiatric: no depression, no SI/HI  Hematology/Lymphatics: no easy bruising  Endocrine: no heat or cold intolerance. no weight gain or loss  Allergic/Immunologic: no allergy or recent reaction

## 2019-10-03 NOTE — H&P ADULT - ASSESSMENT
60 yo M PMHx CAD,CABG x3 in Oct 2018, DM2, HLD, hypothyroidism, former smoker, p/w R sided neck/chest/arm/back pain.

## 2019-10-03 NOTE — H&P ADULT - NSICDXPASTSURGICALHX_GEN_ALL_CORE_FT
PAST SURGICAL HISTORY:  Paraumbilical hernia     S/P CABG (coronary artery bypass graft) October 2018

## 2019-10-03 NOTE — ED ADULT TRIAGE NOTE - CHIEF COMPLAINT QUOTE
chest pain radiating to back starting at 5:30am today. chest pain "radiating straight through to back" starting at 5:30am today

## 2019-10-03 NOTE — H&P ADULT - NSICDXPASTMEDICALHX_GEN_ALL_CORE_FT
PAST MEDICAL HISTORY:  CAD (coronary artery disease)     Diabetes mellitus type 2 in nonobese     Hyperlipidemia     Umbilical hernia

## 2019-10-03 NOTE — H&P ADULT - PROBLEM SELECTOR PLAN 1
cardiology recs apprec  pain likely musculoskeletal, reproducible ttp right scapula  EKG NSR no ST changes  CT chest shows no intramural hematoma or aortic dissection  trend trops  cont tele monitor  keep NPO for now cardiology recs apprec  pain likely musculoskeletal, reproducible ttp right scapula  EKG NSR no ST changes  CT chest shows no intramural hematoma or aortic dissection  trend trops  pain control w/tylenol PRN and lidocaine patch  cont tele monitor  keep NPO for now  IV fluids while NPO cardiology recs apprec  pain likely musculoskeletal, reproducible ttp right scapula  EKG NSR no ST changes  CT chest shows no intramural hematoma or aortic dissection  chest xray clear lungs  trend trops  pain control w/tylenol PRN and lidocaine patch  cont tele monitor  keep NPO for now  IV fluids while NPO ACS ruled out, trop negative  pain likely musculoskeletal, reproducible TTP right scapula  EKG NSR no ST changes  CT chest shows no intramural hematoma or aortic dissection  chest xray clear lungs  pain control w/ tylenol PRN and lidocaine patch right scapula  cont tele monitor  cardiology consult, will defer regarding if further testing required inpatient including stress test

## 2019-10-03 NOTE — ED PROVIDER NOTE - PHYSICAL EXAMINATION
Gen: AAOx3, non-toxic, appears uncomfortable  Head: NCAT  HEENT: EOMI, oral mucosa moist, normal conjunctiva  Lung: CTAB, no respiratory distress, no wheezes/rhonchi/rales B/L, speaking in full sentences  CV: RRR, no murmurs, rubs or gallops  Abd: soft, NTND, no guarding  MSK: no visible deformities  Neuro: No focal sensory or motor deficits  Skin: Warm, well perfused, no rash  Psych: normal affect.   ~Dimitry Wharton M.D. Resident

## 2019-10-03 NOTE — ED PROVIDER NOTE - NS ED ROS FT
GENERAL: No fever or chills, EYES: no change in vision, HEENT: no trouble swallowing or speaking, CARDIAC: R sided chest pain, PULMONARY: no cough or SOB, GI: no abdominal pain, no nausea, no vomiting, no diarrhea or constipation, : No changes in urination, SKIN: no rashes, NEURO: no headache,  MSK: No joint pain ~Dimitry Whraton M.D. Resident

## 2019-10-03 NOTE — H&P ADULT - HISTORY OF PRESENT ILLNESS
60 yo M PMHx CAD,CABG x3 in Oct 2018, DM2, HLD, hypothyroidism, former smoker, p/w R sided neck/chest/arm/back pain. Pain is constant and worsened with movement of his arm. Pain began around 5:30 AM and awoke him from sleep. Patient states his sx are similar to what he felt prior to his CABG. He denies SOB, palpitations, fevers/chills, N/V, abd pain.    In ED trop 20, EKG NSR HR 81, chest xray clear lungs, CT chest/abd shows no intramural hematoma or aortic dissection. Pt given morphine 2mgIV. Cardiology consulted. 58 yo M PMHx CAD,CABG x3 in Oct 2018, DM2, HLD, hypothyroidism, former smoker, p/w R sided neck/chest/arm/back pain. Pain is constant and worsened with movement of his arm. specifically right scapula tender. Pain began around 5:30 AM and awoke him from sleep. Patient states his sx are similar to what he felt prior to his CABG except on the other side. He denies SOB, palpitations, fevers/chills, N/V, abd pain.    In ED trop 20, EKG NSR HR 81, chest xray clear lungs, CT chest/abd shows no intramural hematoma or aortic dissection. Pt given morphine 2mgIV. Cardiology consulted.

## 2019-10-03 NOTE — ED PROVIDER NOTE - OBJECTIVE STATEMENT
58 yo M PMHx CAD, CABG x3 in 2018, DM2 p/w R sided neck/arm/back pain. Pain began around 5:30 AM and awoke him from sleep. No 60 yo M PMHx CAD, CABG x3 in 2018, DM2 p/w R sided neck/chest/arm/back pain. Pain is constant and worsened with movement of his arm. Pain began around 5:30 AM and awoke him from sleep. Patient states his sx are similar to what he felt prior to his CABG. He denies SOB, palpitations, fevers/chills, N/V, abd pain.

## 2019-10-03 NOTE — CONSULT NOTE ADULT - SUBJECTIVE AND OBJECTIVE BOX
Requesting Physician : Dr. Matute     Reason for Consultation: CAD, chest pain     HISTORY OF PRESENT ILLNESS:  60 yo M with history of CAD s/p CABG (10/2018, LIMA-LAD, SVG-RI, SVG-LCx) HLD, DM, former smoker who is being seen for chest pain and management of CAD.  The patient was admitted with right sided chest pain that began at rest.  Pain was described as sharp with radiation to the right shoulder and scapula.  The patient reports similar symptoms prior to his CABG except his anginal equivalent is left sided chest pain.  The patient was admitted and ruled out for aortic dissection.  He was chest pain free upon evaluation.  Cardiology consulted to further evaluate.  The patient was last admitted in Oct of 2018 at which time I had performed cardiac cath which showed multivessel cad including functionally significant LAD lesion.  He was sent for CABG which he tolerated well; he has been largely anginal free since bypass.        PAST MEDICAL & SURGICAL HISTORY:  Hyperlipidemia  CAD (coronary artery disease)  Umbilical hernia  Diabetes mellitus type 2 in nonobese  S/P CABG (coronary artery bypass graft): October 2018  Paraumbilical hernia          MEDICATIONS:  MEDICATIONS  (STANDING):  aspirin enteric coated 81 milliGRAM(s) Oral daily  ATENolol  Tablet 50 milliGRAM(s) Oral every 12 hours  atorvastatin 80 milliGRAM(s) Oral at bedtime  dextrose 5%. 1000 milliLiter(s) (50 mL/Hr) IV Continuous <Continuous>  dextrose 50% Injectable 12.5 Gram(s) IV Push once  influenza   Vaccine 0.5 milliLiter(s) IntraMuscular once  insulin lispro (HumaLOG) corrective regimen sliding scale   SubCutaneous three times a day before meals  levothyroxine 25 MICROGram(s) Oral daily  lidocaine   Patch 1 Patch Transdermal daily  pantoprazole    Tablet 40 milliGRAM(s) Oral before breakfast      Allergies    No Known Allergies    Intolerances        FAMILY HISTORY:  FH: hyperlipidemia  FH: hypertension  Family history of coronary artery disease    Non-contributary for premature coronary disease or sudden cardiac death    SOCIAL HISTORY:    [x ] Non-smoker  [ ] Smoker  [ ] Alcohol      REVIEW OF SYSTEMS:  [x ]chest pain  [  ]shortness of breath  [  ]palpitations  [  ]syncope  [ ]near syncope [ ]upper extremity weakness   [ ] lower extremity weakness  [  ]diplopia  [  ]altered mental status   [  ]fevers  [ ]chills [ ]nausea  [ ]vomitting  [  ]dysphagia    [ ]abdominal pain  [ ]melena  [ ]BRBPR    [  ]epistaxis  [  ]rash    [ ]lower extremity edema        [x ] All others negative	  [ ] Unable to obtain    PHYSICAL EXAM:  T(C): 36.4 (10-03-19 @ 14:21), Max: 36.7 (10-03-19 @ 11:00)  HR: 72 (10-03-19 @ 14:21) (67 - 95)  BP: 146/82 (10-03-19 @ 14:21) (141/84 - 180/93)  RR: 18 (10-03-19 @ 14:21) (16 - 20)  SpO2: 99% (10-03-19 @ 14:21) (96% - 99%)  Wt(kg): --  I&O's Summary        HEENT:   Normal oral mucosa, PERRL, EOMI	  Lymphatic: No lymphadenopathy , no edema  Cardiovascular: Normal S1 S2, No JVD, No murmurs , Peripheral pulses palpable 2+ bilaterally  Respiratory: Lungs clear to auscultation, normal effort 	  Gastrointestinal:  Soft, Non-tender, + BS	  Skin: No rashes, No ecchymoses, No cyanosis, warm to touch  Musculoskeletal: Normal range of motion, normal strength  Psychiatry:  Mood & affect appropriate      TELEMETRY: SR	    ECG:  NSR	  RADIOLOGY:  OTHER:     DIAGNOSTIC TESTING:  [ ] Echocardiogram: < from: Transthoracic Echocardiogram (10.18.18 @ 12:46) >  Conclusions:  1. Normal left ventricular internal dimensions and wall  thicknesses.  2. Mild global left ventricular systolic dysfunction with  mild segmental abnormalities.  Mild hypokinesis of the base  to mid anteroseptal, septal walls.  3. Normal diastolic function  4. Normal right ventricular size and function.  *** No previous Echo exam.    < end of copied text >    [ ]  Catheterization: < from: Cardiac Cath Lab - Adult (10.17.18 @ 15:56) >  VENTRICLES: EF estimated was 55 %.  CORONARY VESSELS: The coronary circulation is right dominant.  LM:   --  LM: Normal.  LAD:   --  Proximal LAD: Angiography showed minor luminal irregularities  with no flow limiting lesions.  --  Mid LAD: There was a diffuse 70 % stenosis that was significant by IFR.  --  Distal LAD: Angiography showed mild atherosclerosis with no flow  limiting lesions.  CX:   --  Circumflex: Angiography showed minor luminal irregularities with  no flow limiting lesions.  --  OM1: There was a discrete 90 % stenosis.  RI:   --  Ramus intermedius: There was a diffuse 70 % stenosis.  RCA:   --  Proximal RCA: Angiography showed minor luminal irregularities  with no flow limiting lesions.  --  Mid RCA: There was a diffuse 50 % stenosis.  --  Distal RCA: Angiography showed mild atherosclerosis with no flow  limiting lesions.  --  RPDA: Angiography showed minor luminal irregularities with no flow  limiting lesions.  --  RPLS: Angiography showed minor luminal irregularities with no flow  limiting lesions.  COMPLICATIONS: There were no complications.  DIAGNOSTIC RECOMMENDATIONS: Coronary angiogram demonstrates multivessel CAD  including a functionally significant lesion by IFR in the LAD. CT surgery  consultation will be obtained for CABG evaluation.  Prepared and signed by  Linda Hoffman M.D.    < end of copied text >    [ ] Stress Test:    	  	  LABS:	 	    CARDIAC MARKERS:                              13.4   7.23  )-----------( 265      ( 03 Oct 2019 07:29 )             40.5     10-03    137  |  96  |  24<H>  ----------------------------<  327<H>  4.5   |  23  |  1.03    Ca    9.8      03 Oct 2019 07:29    TPro  8.0  /  Alb  4.9  /  TBili  0.5  /  DBili  x   /  AST  32  /  ALT  36  /  AlkPhos  99  10-03    proBNP:   Lipid Profile:   HgA1c:   TSH:     ASSESSMENT/PLAN:   60 yo M with history of CAD s/p CABG (10/2018, LIMA-LAD, SVG-RI, SVG-LCx) HLD, DM, former smoker who is being seen for chest pain and management of CAD.     -pt. chest pain free upon evaluation with no significant ECG changes  -no urgent cath needed at this time  -given known cad and chest pain, recommend NST   -if NST abnormal recommend cath  -further interventional workup pending above    Linda Hoffman MD

## 2019-10-03 NOTE — ED PROVIDER NOTE - ATTENDING CONTRIBUTION TO CARE
Private Physician [place] [facility] Surg  59y male pmh 3v-Cabg, Ex smoker, HLD, DM, comes to ed complains of cp cw prior ischemia,awoke pt from sleep at 530a, No dizzy,sob,cough,hemoptysis,nvdc,abd pain. Pain rad from neck to rt chest post, worse with movement and palp. PE WDWN male awake alert normocephalic atraumatic neck supple chest clear anterior & posterior, +reporducable ttp rt chest post. abd soft +bs no mass guarding. Neuro no focal defects. cv no rgm  Jason Craig MD, Facep

## 2019-10-03 NOTE — H&P ADULT - NSHPLABSRESULTS_GEN_ALL_CORE
LABS:                        13.4   7.23  )-----------( 265      ( 03 Oct 2019 07:29 )             40.5     10-03    137  |  96  |  24<H>  ----------------------------<  327<H>  4.5   |  23  |  1.03    Ca    9.8      03 Oct 2019 07:29    TPro  8.0  /  Alb  4.9  /  TBili  0.5  /  DBili  x   /  AST  32  /  ALT  36  /  AlkPhos  99  10-03    PT/INR - ( 03 Oct 2019 07:29 )   PT: 12.8 sec;   INR: 1.11 ratio         PTT - ( 03 Oct 2019 07:29 )  PTT:33.4 sec  CAPILLARY BLOOD GLUCOSE                RADIOLOGY & ADDITIONAL TESTS:    Imaging Personally Reviewed:  [x] YES  [ ] NO    Consultant(s) Notes Reviewed:  [x] YES  [ ] NO    Care Discussed with Consultants/Other Providers [x] YES  [ ] NO

## 2019-10-03 NOTE — ED ADULT NURSE NOTE - OBJECTIVE STATEMENT
Pt 58 y/o male presents to ED complaining of right sided neck/shoulder pain since 530 this am. Pt PMH includes DM, HTN, HLD and recent CABG 8 months ago, on baby ASA. Pt reports symptoms being similar to last heart attack which proceeded today's ED visit. Pt reports pain being 7/10 and took tylenol this am with little relief., NSR noted. Pt is pale appearing, breathing spontaneous and unlabored, lungs CTA. Pt speaking full sentences without difficulty.  Pt placed on  continuous pulse ox and continuous cardiac monitor. 18G IV paved in Left AC, 20G IV placed in right forearm, labs sent as ordered. Pt denies n/v/d, abdominal pain, SOB, CP, HA, vision changes. All safety measures reinforced- bed placed in lowest position and side rails raised. Family at bedside.

## 2019-10-04 ENCOUNTER — TRANSCRIPTION ENCOUNTER (OUTPATIENT)
Age: 59
End: 2019-10-04

## 2019-10-04 VITALS
RESPIRATION RATE: 18 BRPM | OXYGEN SATURATION: 95 % | SYSTOLIC BLOOD PRESSURE: 136 MMHG | DIASTOLIC BLOOD PRESSURE: 91 MMHG | HEART RATE: 71 BPM | TEMPERATURE: 98 F

## 2019-10-04 LAB
ANION GAP SERPL CALC-SCNC: 16 MMOL/L — SIGNIFICANT CHANGE UP (ref 5–17)
BASOPHILS # BLD AUTO: 0.03 K/UL — SIGNIFICANT CHANGE UP (ref 0–0.2)
BASOPHILS NFR BLD AUTO: 0.5 % — SIGNIFICANT CHANGE UP (ref 0–2)
BUN SERPL-MCNC: 19 MG/DL — SIGNIFICANT CHANGE UP (ref 7–23)
CALCIUM SERPL-MCNC: 9.5 MG/DL — SIGNIFICANT CHANGE UP (ref 8.4–10.5)
CHLORIDE SERPL-SCNC: 99 MMOL/L — SIGNIFICANT CHANGE UP (ref 96–108)
CO2 SERPL-SCNC: 23 MMOL/L — SIGNIFICANT CHANGE UP (ref 22–31)
CREAT SERPL-MCNC: 0.99 MG/DL — SIGNIFICANT CHANGE UP (ref 0.5–1.3)
EOSINOPHIL # BLD AUTO: 0.19 K/UL — SIGNIFICANT CHANGE UP (ref 0–0.5)
EOSINOPHIL NFR BLD AUTO: 3.2 % — SIGNIFICANT CHANGE UP (ref 0–6)
GLUCOSE SERPL-MCNC: 211 MG/DL — HIGH (ref 70–99)
HBA1C BLD-MCNC: 10.8 % — HIGH (ref 4–5.6)
HCT VFR BLD CALC: 41.2 % — SIGNIFICANT CHANGE UP (ref 39–50)
HCV AB S/CO SERPL IA: 0.15 S/CO — SIGNIFICANT CHANGE UP (ref 0–0.99)
HCV AB SERPL-IMP: SIGNIFICANT CHANGE UP
HGB BLD-MCNC: 13.5 G/DL — SIGNIFICANT CHANGE UP (ref 13–17)
IMM GRANULOCYTES NFR BLD AUTO: 0.3 % — SIGNIFICANT CHANGE UP (ref 0–1.5)
LYMPHOCYTES # BLD AUTO: 1.77 K/UL — SIGNIFICANT CHANGE UP (ref 1–3.3)
LYMPHOCYTES # BLD AUTO: 29.5 % — SIGNIFICANT CHANGE UP (ref 13–44)
MCHC RBC-ENTMCNC: 28.4 PG — SIGNIFICANT CHANGE UP (ref 27–34)
MCHC RBC-ENTMCNC: 32.8 GM/DL — SIGNIFICANT CHANGE UP (ref 32–36)
MCV RBC AUTO: 86.6 FL — SIGNIFICANT CHANGE UP (ref 80–100)
MONOCYTES # BLD AUTO: 0.58 K/UL — SIGNIFICANT CHANGE UP (ref 0–0.9)
MONOCYTES NFR BLD AUTO: 9.7 % — SIGNIFICANT CHANGE UP (ref 2–14)
NEUTROPHILS # BLD AUTO: 3.41 K/UL — SIGNIFICANT CHANGE UP (ref 1.8–7.4)
NEUTROPHILS NFR BLD AUTO: 56.8 % — SIGNIFICANT CHANGE UP (ref 43–77)
NRBC # BLD: 0 /100 WBCS — SIGNIFICANT CHANGE UP (ref 0–0)
PLATELET # BLD AUTO: 296 K/UL — SIGNIFICANT CHANGE UP (ref 150–400)
POTASSIUM SERPL-MCNC: 3.9 MMOL/L — SIGNIFICANT CHANGE UP (ref 3.5–5.3)
POTASSIUM SERPL-SCNC: 3.9 MMOL/L — SIGNIFICANT CHANGE UP (ref 3.5–5.3)
RBC # BLD: 4.76 M/UL — SIGNIFICANT CHANGE UP (ref 4.2–5.8)
RBC # FLD: 13 % — SIGNIFICANT CHANGE UP (ref 10.3–14.5)
SODIUM SERPL-SCNC: 138 MMOL/L — SIGNIFICANT CHANGE UP (ref 135–145)
TSH SERPL-MCNC: 5.35 UIU/ML — HIGH (ref 0.27–4.2)
WBC # BLD: 6 K/UL — SIGNIFICANT CHANGE UP (ref 3.8–10.5)
WBC # FLD AUTO: 6 K/UL — SIGNIFICANT CHANGE UP (ref 3.8–10.5)

## 2019-10-04 PROCEDURE — 71045 X-RAY EXAM CHEST 1 VIEW: CPT

## 2019-10-04 PROCEDURE — 86803 HEPATITIS C AB TEST: CPT

## 2019-10-04 PROCEDURE — 84484 ASSAY OF TROPONIN QUANT: CPT

## 2019-10-04 PROCEDURE — 85610 PROTHROMBIN TIME: CPT

## 2019-10-04 PROCEDURE — 99285 EMERGENCY DEPT VISIT HI MDM: CPT | Mod: 25

## 2019-10-04 PROCEDURE — 84443 ASSAY THYROID STIM HORMONE: CPT

## 2019-10-04 PROCEDURE — 83036 HEMOGLOBIN GLYCOSYLATED A1C: CPT

## 2019-10-04 PROCEDURE — 85730 THROMBOPLASTIN TIME PARTIAL: CPT

## 2019-10-04 PROCEDURE — 74174 CTA ABD&PLVS W/CONTRAST: CPT

## 2019-10-04 PROCEDURE — 73030 X-RAY EXAM OF SHOULDER: CPT

## 2019-10-04 PROCEDURE — 82962 GLUCOSE BLOOD TEST: CPT

## 2019-10-04 PROCEDURE — 80048 BASIC METABOLIC PNL TOTAL CA: CPT

## 2019-10-04 PROCEDURE — 93005 ELECTROCARDIOGRAM TRACING: CPT

## 2019-10-04 PROCEDURE — 71275 CT ANGIOGRAPHY CHEST: CPT

## 2019-10-04 PROCEDURE — 80053 COMPREHEN METABOLIC PANEL: CPT

## 2019-10-04 PROCEDURE — 96374 THER/PROPH/DIAG INJ IV PUSH: CPT | Mod: XU

## 2019-10-04 PROCEDURE — 85027 COMPLETE CBC AUTOMATED: CPT

## 2019-10-04 RX ORDER — LIDOCAINE 4 G/100G
1 CREAM TOPICAL
Qty: 90 | Refills: 0
Start: 2019-10-04 | End: 2019-11-02

## 2019-10-04 RX ORDER — TRAMADOL HYDROCHLORIDE 50 MG/1
25 TABLET ORAL EVERY 12 HOURS
Refills: 0 | Status: DISCONTINUED | OUTPATIENT
Start: 2019-10-04 | End: 2019-10-04

## 2019-10-04 RX ORDER — TRAMADOL HYDROCHLORIDE 50 MG/1
0.5 TABLET ORAL
Qty: 3 | Refills: 0
Start: 2019-10-04 | End: 2019-10-06

## 2019-10-04 RX ORDER — ACETAMINOPHEN 500 MG
2 TABLET ORAL
Qty: 0 | Refills: 0 | DISCHARGE
Start: 2019-10-04

## 2019-10-04 RX ADMIN — LIDOCAINE 1 PATCH: 4 CREAM TOPICAL at 11:57

## 2019-10-04 RX ADMIN — TRAMADOL HYDROCHLORIDE 25 MILLIGRAM(S): 50 TABLET ORAL at 09:57

## 2019-10-04 RX ADMIN — ATENOLOL 50 MILLIGRAM(S): 25 TABLET ORAL at 17:08

## 2019-10-04 RX ADMIN — Medication 25 MICROGRAM(S): at 05:17

## 2019-10-04 RX ADMIN — TRAMADOL HYDROCHLORIDE 25 MILLIGRAM(S): 50 TABLET ORAL at 08:07

## 2019-10-04 RX ADMIN — Medication 2: at 18:05

## 2019-10-04 RX ADMIN — Medication 3: at 13:41

## 2019-10-04 RX ADMIN — LIDOCAINE 1 PATCH: 4 CREAM TOPICAL at 02:00

## 2019-10-04 RX ADMIN — Medication 81 MILLIGRAM(S): at 11:56

## 2019-10-04 RX ADMIN — ATENOLOL 50 MILLIGRAM(S): 25 TABLET ORAL at 05:17

## 2019-10-04 RX ADMIN — PANTOPRAZOLE SODIUM 40 MILLIGRAM(S): 20 TABLET, DELAYED RELEASE ORAL at 05:17

## 2019-10-04 NOTE — CHART NOTE - NSCHARTNOTEFT_GEN_A_CORE
Stress Lab NP    During interview process pt was in pain with left arm over his head.  Unable to lie flat with arms over his head and stay still for 10min for resting and for stress images.  Informed Christie the ACP covering him on [address] [place].  Attempted to inform Dr. Hoffman,  Unable to reach him.  Pt sent back to his room.    Linda Ramirez, [facility]-BC  Cardiology

## 2019-10-04 NOTE — DISCHARGE NOTE NURSING/CASE MANAGEMENT/SOCIAL WORK - PATIENT PORTAL LINK FT
You can access the FollowMyHealth Patient Portal offered by [facility] by registering at the following website: [email or web address]. By joining [email or web address] [email or web address] portal, you will also be able to view your health information using other applications (apps) compatible with our system.

## 2019-10-04 NOTE — DISCHARGE NOTE PROVIDER - CARE PROVIDER_API CALL
Kenyatta Chaney)  Cardiovascular Disease; Internal Medicine  [address], [address] [facility] [address]  [place] [address]  Phone: [phone]  Fax: [phone]  Follow Up Time: 2 weeks Kenyatta Chaney)  Cardiovascular Disease; Internal Medicine  [address], [address]  [place] [address]  Phone: [phone]  Fax: [phone]  Follow Up Time: 1 week

## 2019-10-04 NOTE — PROGRESS NOTE ADULT - PROBLEM SELECTOR PLAN 1
ACS ruled out, trop negative  pain likely 2/2 Right infraspinatus calcific tendinosis  EKG NSR no ST changes  CT chest shows no intramural hematoma or aortic dissection  chest xray clear lungs  pain control w/ tylenol PRN and tramadol  cont tele monitor  appreciate cardio recs, for nuclear stress

## 2019-10-04 NOTE — PROGRESS NOTE ADULT - ATTENDING COMMENTS
Patient seen and examined.  Agree with above.   -Pt. chest pain free  -pt. unable to tolerate NST due to inability to raise arms  -pt. being treated for Right infraspinatus calcific tendinosis which is the likely cause of his symptoms  -at this time, the patient does not want invasive cv procedures (i.e. cath) or further ischemic evaluation  -given above and since patient's symptoms were atypical and likely secondary to calcific tendinosis, no further cardiac workup planned at this time  -follow up with Dr. Matute after dc    Linda Hoffman MD
dispo: dc pending stress test, oupt PT for calcific tendinosis

## 2019-10-04 NOTE — DISCHARGE NOTE PROVIDER - HOSPITAL COURSE
58 yo M PMHx CAD,CABG x3 in Oct 2018, DM2, HLD, hypothyroidism, former smoker, p/w R sided neck/chest/arm/back pain.         Problem/Plan - 1:Chest pain.  Plan: ACS ruled out, trop negative    pain likely 2/2 Right infraspinatus calcific tendinosis    EKG NSR no ST changes    CT chest shows no intramural hematoma or aortic dissection    chest xray clear lungs    pain control w/ Tylenol PRN and tramadol    Unable to do nuclear stress test, as patient unable to lye flat with arms at side    per cards will need Echocardiogram as outpatient          Problem/Plan - 2:   CAD (coronary artery disease).  Plan: pt s/p CABGx3 10/22/18    continue  Aspirin, Atenolol, Statin.          Problem/Plan - 3:   Diabetes mellitus type 2 in nonobese.     Continue Glimipiside 4 mg po daily     check hgbA1c.          Problem/Plan - 4:  Hyperlipidemia.      Continue Atorvastatin.          Problem/Plan - 5:  Hypothyroidism.      Continue  levothyroxine.

## 2019-10-04 NOTE — PROGRESS NOTE ADULT - SUBJECTIVE AND OBJECTIVE BOX
Patient is a 59y old  Male who presents with a chief complaint of Chest pain (04 Oct 2019 05:05)      SUBJECTIVE / OVERNIGHT EVENTS:    Patient seen and examined. denies cp sob. states he cannot run 2/2 pain in right shoulder. states lidoderm patch did not help but tramadol helps pain.      Vital Signs Last 24 Hrs  T(C): 36.8 (04 Oct 2019 04:53), Max: 36.8 (03 Oct 2019 20:52)  T(F): 98.2 (04 Oct 2019 04:53), Max: 98.2 (03 Oct 2019 20:52)  HR: 73 (04 Oct 2019 04:53) (67 - 97)  BP: 138/93 (04 Oct 2019 04:53) (134/87 - 154/79)  BP(mean): --  RR: 18 (04 Oct 2019 04:53) (16 - 18)  SpO2: 97% (04 Oct 2019 04:53) (93% - 99%)  I&O's Summary    03 Oct 2019 07:01  -  04 Oct 2019 07:00  --------------------------------------------------------  IN: 240 mL / OUT: 1100 mL / NET: -860 mL        PE:  GENERAL: NAD, AAOx3  HEAD:  Atraumatic, Normocephalic  EYES: EOMI, PERRLA, conjunctiva and sclera clear  NECK: Supple, No JVD  CHEST/LUNG: CTABL, No wheeze  HEART: Regular rate and rhythm; no murmur  ABDOMEN: Soft, Nontender, Nondistended; Bowel sounds present  EXTREMITIES:  2+ Peripheral Pulses, No clubbing, cyanosis, or edema, FROM, TTP right scapula and right shoulder  SKIN: No rashes or lesions  NEURO: No focal deficits    LABS:                        13.5   6.00  )-----------( 296      ( 04 Oct 2019 07:23 )             41.2     10-04    138  |  99  |  19  ----------------------------<  211<H>  3.9   |  23  |  0.99    Ca    9.5      04 Oct 2019 07:02    TPro  8.0  /  Alb  4.9  /  TBili  0.5  /  DBili  x   /  AST  32  /  ALT  36  /  AlkPhos  99  10-03    PT/INR - ( 03 Oct 2019 07:29 )   PT: 12.8 sec;   INR: 1.11 ratio         PTT - ( 03 Oct 2019 07:29 )  PTT:33.4 sec  CAPILLARY BLOOD GLUCOSE      POCT Blood Glucose.: 218 mg/dL (04 Oct 2019 08:28)  POCT Blood Glucose.: 206 mg/dL (03 Oct 2019 21:43)  POCT Blood Glucose.: 187 mg/dL (03 Oct 2019 18:29)  POCT Blood Glucose.: 120 mg/dL (03 Oct 2019 15:28)            RADIOLOGY & ADDITIONAL TESTS:    Imaging Personally Reviewed:  [x] YES  [ ] NO    Consultant(s) Notes Reviewed:  [x] YES  [ ] NO    MEDICATIONS  (STANDING):  aspirin enteric coated 81 milliGRAM(s) Oral daily  ATENolol  Tablet 50 milliGRAM(s) Oral every 12 hours  atorvastatin 80 milliGRAM(s) Oral at bedtime  dextrose 5%. 1000 milliLiter(s) (50 mL/Hr) IV Continuous <Continuous>  dextrose 50% Injectable 12.5 Gram(s) IV Push once  influenza   Vaccine 0.5 milliLiter(s) IntraMuscular once  insulin lispro (HumaLOG) corrective regimen sliding scale   SubCutaneous three times a day before meals  levothyroxine 25 MICROGram(s) Oral daily  lidocaine   Patch 1 Patch Transdermal daily  pantoprazole    Tablet 40 milliGRAM(s) Oral before breakfast  traMADol 25 milliGRAM(s) Oral every 12 hours    MEDICATIONS  (PRN):  acetaminophen   Tablet .. 650 milliGRAM(s) Oral every 6 hours PRN Mild Pain (1 - 3)  dextrose 40% Gel 15 Gram(s) Oral once PRN Blood Glucose LESS THAN 70 milliGRAM(s)/deciliter  glucagon  Injectable 1 milliGRAM(s) IntraMuscular once PRN Glucose LESS THAN 70 milligrams/deciliter      Care Discussed with Consultants/Other Providers [x] YES  [ ] NO    HEALTH ISSUES - PROBLEM Dx:  Prophylactic measure: Prophylactic measure  Hypothyroidism: Hypothyroidism  Hyperlipidemia: Hyperlipidemia  Diabetes mellitus type 2 in nonobese: Diabetes mellitus type 2 in nonobese  CAD (coronary artery disease): CAD (coronary artery disease)  Chest pain: Chest pain

## 2019-10-04 NOTE — DISCHARGE NOTE PROVIDER - NSDCCAREPROVSEEN_GEN_ALL_CORE_FT
Isabel, Evangelista Hoffman, Linda  [facility] Medicine, Advance PracticeTeam Isabel, Evangelista Hoffman, Linda  [facility] Medicine, Advance PracticeTeShalonda Jean Deborah

## 2019-10-04 NOTE — DISCHARGE NOTE PROVIDER - PROVIDER TOKENS
PROVIDER:[TOKEN:[[id number]],FOLLOWUP:[2 weeks]] PROVIDER:[TOKEN:[[id number]],FOLLOWUP:[1 week]]

## 2019-10-04 NOTE — PROGRESS NOTE ADULT - SUBJECTIVE AND OBJECTIVE BOX
Subjective: pt seen and examined, no complaints, ROS - .          acetaminophen   Tablet .. 650 milliGRAM(s) Oral every 6 hours PRN  aspirin enteric coated 81 milliGRAM(s) Oral daily  ATENolol  Tablet 50 milliGRAM(s) Oral every 12 hours  atorvastatin 80 milliGRAM(s) Oral at bedtime  dextrose 40% Gel 15 Gram(s) Oral once PRN  dextrose 5%. 1000 milliLiter(s) IV Continuous <Continuous>  dextrose 50% Injectable 12.5 Gram(s) IV Push once  glucagon  Injectable 1 milliGRAM(s) IntraMuscular once PRN  influenza   Vaccine 0.5 milliLiter(s) IntraMuscular once  insulin lispro (HumaLOG) corrective regimen sliding scale   SubCutaneous three times a day before meals  levothyroxine 25 MICROGram(s) Oral daily  lidocaine   Patch 1 Patch Transdermal daily  pantoprazole    Tablet 40 milliGRAM(s) Oral before breakfast                            13.4   7.23  )-----------( 265      ( 03 Oct 2019 07:29 )             40.5       Hemoglobin: 13.4 g/dL (10-03 @ 07:29)      10-03    137  |  96  |  24<H>  ----------------------------<  327<H>  4.5   |  23  |  1.03    Ca    9.8      03 Oct 2019 07:29    TPro  8.0  /  Alb  4.9  /  TBili  0.5  /  DBili  x   /  AST  32  /  ALT  36  /  AlkPhos  99  10-03    Creatinine Trend: 1.03<--    COAGS:           T(C): 36.8 (10-04-19 @ 04:53), Max: 36.8 (10-03-19 @ 20:52)  HR: 73 (10-04-19 @ 04:53) (67 - 97)  BP: 138/93 (10-04-19 @ 04:53) (134/87 - 180/93)  RR: 18 (10-04-19 @ 04:53) (16 - 20)  SpO2: 97% (10-04-19 @ 04:53) (93% - 99%)  Wt(kg): --    I&O's Summary    03 Oct 2019 07:01  -  04 Oct 2019 05:06  --------------------------------------------------------  IN: 240 mL / OUT: 1100 mL / NET: -860 mL      HEENT:   Normal oral mucosa, PERRL, EOMI	  Lymphatic: No lymphadenopathy , no edema  Cardiovascular: Normal S1 S2, No JVD, No murmurs , Peripheral pulses palpable 2+ bilaterally  Respiratory: Lungs clear to auscultation, normal effort 	  Gastrointestinal:  Soft, Non-tender, + BS	  Skin: No rashes, No ecchymoses, No cyanosis, warm to touch  Musculoskeletal: Normal range of motion, normal strength  Psychiatry:  Mood & affect appropriate      TELEMETRY: SR	    ECG:  NSR	  RADIOLOGY:  OTHER:     DIAGNOSTIC TESTING:  [ ] Echocardiogram: < from: Transthoracic Echocardiogram (10.18.18 @ 12:46) >  Conclusions:  1. Normal left ventricular internal dimensions and wall  thicknesses.  2. Mild global left ventricular systolic dysfunction with  mild segmental abnormalities.  Mild hypokinesis of the base  to mid anteroseptal, septal walls.  3. Normal diastolic function  4. Normal right ventricular size and function.  *** No previous Echo exam.    < end of copied text >    [ ]  Catheterization: < from: Cardiac Cath Lab - Adult (10.17.18 @ 15:56) >  VENTRICLES: EF estimated was 55 %.  CORONARY VESSELS: The coronary circulation is right dominant.  LM:   --  LM: Normal.  LAD:   --  Proximal LAD: Angiography showed minor luminal irregularities  with no flow limiting lesions.  --  Mid LAD: There was a diffuse 70 % stenosis that was significant by IFR.  --  Distal LAD: Angiography showed mild atherosclerosis with no flow  limiting lesions.  CX:   --  Circumflex: Angiography showed minor luminal irregularities with  no flow limiting lesions.  --  OM1: There was a discrete 90 % stenosis.  RI:   --  Ramus intermedius: There was a diffuse 70 % stenosis.  RCA:   --  Proximal RCA: Angiography showed minor luminal irregularities  with no flow limiting lesions.  --  Mid RCA: There was a diffuse 50 % stenosis.  --  Distal RCA: Angiography showed mild atherosclerosis with no flow  limiting lesions.  --  RPDA: Angiography showed minor luminal irregularities with no flow  limiting lesions.  --  RPLS: Angiography showed minor luminal irregularities with no flow  limiting lesions.  COMPLICATIONS: There were no complications.  DIAGNOSTIC RECOMMENDATIONS: Coronary angiogram demonstrates multivessel CAD  including a functionally significant lesion by IFR in the LAD. CT surgery  consultation will be obtained for CABG evaluation.  Prepared and signed by  Linda Hoffman M.D.    < end of copied text >      ASSESSMENT/PLAN: 	59y Male  with history of CAD s/p CABG (10/2018, LIMA-LAD, SVG-RI, SVG-LCx) HLD, DM, former smoker who is being seen for chest pain and management of CAD.     tele stable overnight , nsr   ASA, statin  tolerating BB,    Cardiac markers neg so far   **given known cad and chest pain, recommend NST   -if NST abnormal recommend cath  -further interventional workup pending above

## 2019-10-04 NOTE — DISCHARGE NOTE PROVIDER - NSDCCPCAREPLAN_GEN_ALL_CORE_FT
PRINCIPAL DISCHARGE DIAGNOSIS  Diagnosis: Chest pain  Assessment and Plan of Treatment:   HOME CARE INSTRUCTIONS  For the next few days, avoid physical activities that bring on chest pain. Continue physical activities as directed.  Do not smoke.  Avoid drinking alcohol.   Only take over-the-counter or prescription medicine for pain, discomfort, or fever as directed by your caregiver.  Follow your caregiver's suggestions for further testing if your chest pain does not go away.  Keep any follow-up appointments you made. If you do not go to an appointment, you could develop lasting (chronic) problems with pain. If there is any problem keeping an appointment, you must call to reschedule.   SEEK MEDICAL CARE IF:  You think you are having problems from the medicine you are taking. Read your medicine instructions carefully.  Your chest pain does not go away, even after treatment.  You develop a rash with blisters on your chest.  SEEK IMMEDIATE MEDICAL CARE IF:  You have increased chest pain or pain that spreads to your arm, neck, jaw, back, or abdomen.   You develop shortness of breath, an increasing cough, or you are coughing up blood.  You have severe back or abdominal pain, feel nauseous, or vomit.  You develop severe weakness, fainting, or chills.  You have a fever.  THIS IS AN EMERGENCY. Do not wait to see if the pain will go away. Get medical help at once. Call your local emergency services ([phone]). Do not drive yourself to the hospital.        SECONDARY DISCHARGE DIAGNOSES  Diagnosis: CAD (coronary artery disease)  Assessment and Plan of Treatment: Coronary artery disease is a condition where the arteries the supply the heart muscle get clogged with fatty deposits & puts you at risk for a heart attack  Call your doctor if you have any new pain, pressure, or discomfort in the center of your chest, pain, tingling or discomfort in arms, back, neck, jaw, or stomach, shortness of breath, nausea, vomiting, burping or heartburn, sweating, cold and clammy skin, racing or abnormal heartbeat for more than 10 minutes or if they keep coming & going.  Call 911 and do not try to get to hospital by self   You can help yourself with lifestyle changes (quitting smoking if you smoke), eat lots of fruits & vegetables & low fat dairy products, not a lot of meat & fatty foods, walk or some form of physical activity most days of the week, lose weight if you are overweight  Take your cardiac medication as prescribed to lower cholesterol, to lower blood pressure, aspirin to prevent blood clots, and diabetes control  Make sure to keep appointments with doctor for cardiac follow up care      Diagnosis: Diabetes mellitus type 2 in nonobese  Assessment and Plan of Treatment: HgA1C this admission.  Make sure you get your HgA1c checked every three months.  If you take oral diabetes medications, check your blood glucose two times a day.  If you take insulin, check your blood glucose before meals and at bedtime.  It's important not to skip any meals.  Keep a log of your blood glucose results and always take it with you to your doctor appointments.  Keep a list of your current medications including injectables and over the counter medications and bring this medication list with you to all your doctor appointments.  If you have not seen your opthalmologist this year call for appointment.  Check your feet daily for redness, sores, or openings. Do not self treat. If no improvement in two days call your primary care physician for an appointment.  Low blood sugar (hypoglycemia) is a blood sugar below 70mg/dl. Check your blood sugar if you feel signs/symptoms of hypoglycemia. If your blood sugar is below 70 take 15 grams of carbohydrates (ex 4 oz of apple juice, 3-4 glucosr tablets, or 4-6 oz of regular soda) wait 15 minutes and repeat blood sugar to make sure it comes up above 70.  If your blood sugar is above 70 and you are due for a meal, have a meal.  If you are not due for a meal have a snack.  This snack helps keeps your blood sugar at a safe range.      Diagnosis: Hypothyroidism  Assessment and Plan of Treatment:

## 2019-10-04 NOTE — DISCHARGE NOTE PROVIDER - NSDCACTIVITY_GEN_ALL_CORE
Walking - Outdoors allowed/Walking - Indoors allowed/Do not drive or operate machinery/No heavy lifting/straining

## 2019-10-10 NOTE — PROGRESS NOTE ADULT - SUBJECTIVE AND OBJECTIVE BOX
UROLOGY [place]  10 10 19    Age 44 , comes in with interest in vasectomy  Has three children, one female age 14, one boy age 6 months, and one baby 2.5 months. Couple does not want any more children.     Pt voids well, no complaints.       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     Medical:  has a past medical history of ADHD (attention deficit hyperactivity disorder), Chronic Mildly Elevated ALT Levels, Fatty liver, HLD (hyperlipidemia), Hypertension, and Positive Stool Hemoccult Test 4/2/18.    Familial: no fh of kidney stones    Social:  [employer] x 6 yrs, , moved from [place] to [place]    Meds:  Current Outpatient Medications on File Prior to Visit   Medication Sig Dispense Refill    atorvastatin (LIPITOR) 40 MG tablet TAKE ONE TABLET BY MOUTH O 90 tablet 0    omeprazole (PRILOSEC) 40 MG capsule Take 1 capsule (40 mg total) by 30 capsule 11    dextroamphetamine-amphetamine (ADDERALL) 20 mg tablet Take 1 tablet (20 mg total) by  60 tablet 0       REVIEW OF SYSTEMS  GENERAL:  No headaches or dizzy spells.   HEENT: vision preserved. Sinuses: No complaints.   CARDIOPULMONARY: No swelling of the legs; no chest pain. No shortness of breath.   GASTROINTESTINAL: has heartburn. Denies diarrhea; denies constipation, no blood or mucus in stools.   GENITOURINARY: Denies dysuria, bleeding or incontinence.   MUSCULOSKELETAL: No arthritic complaints such as pain or stiffness.   PSYCHIATRIC: No history of depression or anxiety.   ENDOCRINOLOGIC: No reports of sweating, cold or heat intolerance. No polyuria or polydipsia.   NEUROLOGICAL: No dizziness, syncope, paralysis  LYMPHATICS: No enlarged nodes. No history of splenectomy.  ==    Pt alert, oriented, no distress  HEENT: wnl.  Neck: supple, no JVD, no lymphadenopathy  Chest: CV NSR  Lungs: normal chest expansion, no labored breathing  Abdomen slightly prominent,  VITAL SIGNS    Telemetry: SR    Vital Signs Last 24 Hrs  T(C): 36.8 (10-20-18 @ 13:26), Max: 37 (10-20-18 @ 05:00)  T(F): 98.2 (10-20-18 @ 13:26), Max: 98.6 (10-20-18 @ 05:00)  HR: 94 (10-20-18 @ 13:26) (79 - 94)  BP: 106/72 (10-20-18 @ 13:26) (106/72 - 120/75)  RR: 18 (10-20-18 @ 13:26) (18 - 18)  SpO2: 94% (10-20-18 @ 13:26) (94% - 95%)            10-19 @ 07:01  -  10-20 @ 07:00  --------------------------------------------------------  IN: 724 mL / OUT: 400 mL / NET: 324 mL    10-20 @ 07:01  -  10-20 @ 16:14  --------------------------------------------------------  IN: 340 mL / OUT: 500 mL / NET: -160 mL       Daily     Daily Weight in k.7 (20 Oct 2018 07:15)  Admit Wt: Drug Dosing Weight  Height (cm): 167.64 (18 Oct 2018 00:05)  Weight (kg): 72 (18 Oct 2018 00:05)  BMI (kg/m2): 25.6 (18 Oct 2018 00:05)  BSA (m2): 1.81 (18 Oct 2018 00:05)      CAPILLARY BLOOD GLUCOSE      POCT Blood Glucose.: 119 mg/dL (20 Oct 2018 11:48)  POCT Blood Glucose.: 183 mg/dL (20 Oct 2018 07:54)  POCT Blood Glucose.: 125 mg/dL (19 Oct 2018 21:28)  POCT Blood Glucose.: 163 mg/dL (19 Oct 2018 16:46)          MEDICATIONS  acetaminophen   Tablet .. 650 milliGRAM(s) Oral every 6 hours PRN  aspirin enteric coated 81 milliGRAM(s) Oral daily  atorvastatin 40 milliGRAM(s) Oral at bedtime  cefuroxime  IVPB 1500 milliGRAM(s) IV Intermittent once  dextrose 40% Gel 15 Gram(s) Oral once PRN  dextrose 5%. 1000 milliLiter(s) IV Continuous <Continuous>  dextrose 50% Injectable 12.5 Gram(s) IV Push once  dextrose 50% Injectable 25 Gram(s) IV Push once  dextrose 50% Injectable 25 Gram(s) IV Push once  glucagon  Injectable 1 milliGRAM(s) IntraMuscular once PRN  heparin  Infusion 1000 Unit(s)/Hr IV Continuous <Continuous>  insulin glargine Injectable (LANTUS) 16 Unit(s) SubCutaneous at bedtime  insulin lispro (HumaLOG) corrective regimen sliding scale   SubCutaneous Before meals and at bedtime  insulin lispro Injectable (HumaLOG) 6 Unit(s) SubCutaneous three times a day before meals  metoprolol tartrate 25 milliGRAM(s) Oral two times a day  oxyCODONE    5 mG/acetaminophen 325 mG 2 Tablet(s) Oral every 6 hours PRN  oxyCODONE    5 mG/acetaminophen 325 mG 1 Tablet(s) Oral every 4 hours PRN  sodium chloride 0.9% lock flush 3 milliLiter(s) IV Push every 8 hours      PHYSICAL EXAM  Subjective: NAD  Neurology: alert and oriented x 3, nonfocal, no gross deficits  CV : S1S2   Lungs: CTA  Abdomen: soft, NT,ND, (+ )BM  :  voiding  Extremities:   -c/c/e    LABS  10-20    135  |  101  |  14  ----------------------------<  166<H>  4.1   |  23  |  0.96    Ca    9.6      20 Oct 2018 05:48                                   16.2   8.4   )-----------( 296      ( 20 Oct 2018 05:48 )             47.3          PT/INR - ( 20 Oct 2018 05:48 )   PT: 11.4 sec;   INR: 1.05 ratio         PTT - ( 20 Oct 2018 05:48 )  PTT:73.2 sec       PAST MEDICAL & SURGICAL HISTORY:  CAD (coronary artery disease)  Umbilical hernia  Diabetes mellitus type 2 in nonobese  Paraumbilical hernia nontender, no organomegaly, no masses.  There is a prominent surgical scar LLQ secondary to abd explo and partial colectomy and to placement of temporary colostomy  Penis circumcised, meatus nl  Testes nl, epi nl, scrotum nl  Extremities: no edema, peripheral pulses nl  Neuro: preserved      IMP  Encounter for vasectomy counseling    Plan: Counseled pt on surgical sterilization. Discussed pros and cons. Discussed possible complications. Wants to proceed with vasectomy.

## 2019-10-23 NOTE — PHYSICAL THERAPY INITIAL EVALUATION ADULT - WEIGHT-BEARING RESTRICTIONS: GAIT, REHAB EVAL
Hoang  - forgot to mention I started OCPs this visit for continuous menstrual supression and anthony-menstrual mood symptoms. 
full weight-bearing

## 2020-03-24 ENCOUNTER — EMERGENCY (EMERGENCY)
Facility: HOSPITAL | Age: 60
LOS: 1 days | Discharge: HOME | End: 2020-03-24
Attending: EMERGENCY MEDICINE | Admitting: EMERGENCY MEDICINE
Payer: COMMERCIAL

## 2020-03-24 VITALS
OXYGEN SATURATION: 100 % | HEART RATE: 100 BPM | DIASTOLIC BLOOD PRESSURE: 70 MMHG | RESPIRATION RATE: 18 BRPM | SYSTOLIC BLOOD PRESSURE: 116 MMHG | TEMPERATURE: 98 F

## 2020-03-24 VITALS
SYSTOLIC BLOOD PRESSURE: 116 MMHG | DIASTOLIC BLOOD PRESSURE: 73 MMHG | OXYGEN SATURATION: 100 % | HEART RATE: 80 BPM | RESPIRATION RATE: 16 BRPM | TEMPERATURE: 98 F

## 2020-03-24 DIAGNOSIS — Z95.1 PRESENCE OF AORTOCORONARY BYPASS GRAFT: Chronic | ICD-10-CM

## 2020-03-24 DIAGNOSIS — K42.9 UMBILICAL HERNIA WITHOUT OBSTRUCTION OR GANGRENE: Chronic | ICD-10-CM

## 2020-03-24 PROBLEM — E78.5 HYPERLIPIDEMIA, UNSPECIFIED: Chronic | Status: ACTIVE | Noted: 2019-10-03

## 2020-03-24 PROCEDURE — 73522 X-RAY EXAM HIPS BI 3-4 VIEWS: CPT | Mod: 26

## 2020-03-24 PROCEDURE — 72170 X-RAY EXAM OF PELVIS: CPT | Mod: 26,59

## 2020-03-24 PROCEDURE — 72192 CT PELVIS W/O DYE: CPT | Mod: 26

## 2020-03-24 PROCEDURE — 99284 EMERGENCY DEPT VISIT MOD MDM: CPT

## 2020-03-24 PROCEDURE — 76377 3D RENDER W/INTRP POSTPROCES: CPT | Mod: 26

## 2020-03-24 PROCEDURE — 72100 X-RAY EXAM L-S SPINE 2/3 VWS: CPT | Mod: 26

## 2020-03-24 RX ORDER — IBUPROFEN 200 MG
600 TABLET ORAL ONCE
Refills: 0 | Status: COMPLETED | OUTPATIENT
Start: 2020-03-24 | End: 2020-03-24

## 2020-03-24 RX ORDER — KETOROLAC TROMETHAMINE 30 MG/ML
30 SYRINGE (ML) INJECTION ONCE
Refills: 0 | Status: DISCONTINUED | OUTPATIENT
Start: 2020-03-24 | End: 2020-03-24

## 2020-03-24 RX ORDER — OXYCODONE HYDROCHLORIDE 5 MG/1
1 TABLET ORAL
Qty: 16 | Refills: 0
Start: 2020-03-24 | End: 2020-03-27

## 2020-03-24 RX ORDER — OXYCODONE HYDROCHLORIDE 5 MG/1
1 TABLET ORAL
Qty: 10 | Refills: 0
Start: 2020-03-24 | End: 2020-03-25

## 2020-03-24 RX ORDER — OXYCODONE AND ACETAMINOPHEN 5; 325 MG/1; MG/1
1 TABLET ORAL ONCE
Refills: 0 | Status: DISCONTINUED | OUTPATIENT
Start: 2020-03-24 | End: 2020-03-24

## 2020-03-24 RX ORDER — IBUPROFEN 200 MG
1 TABLET ORAL
Qty: 40 | Refills: 0
Start: 2020-03-24 | End: 2020-04-02

## 2020-03-24 RX ADMIN — OXYCODONE AND ACETAMINOPHEN 1 TABLET(S): 5; 325 TABLET ORAL at 16:13

## 2020-03-24 RX ADMIN — Medication 600 MILLIGRAM(S): at 11:25

## 2020-03-24 RX ADMIN — OXYCODONE AND ACETAMINOPHEN 1 TABLET(S): 5; 325 TABLET ORAL at 11:26

## 2020-03-24 RX ADMIN — Medication 30 MILLIGRAM(S): at 16:13

## 2020-03-24 NOTE — ED PROVIDER NOTE - PMH
CAD (coronary artery disease)    Diabetes mellitus type 2 in nonobese    Hyperlipidemia    Umbilical hernia

## 2020-03-24 NOTE — ED ADULT TRIAGE NOTE - CHIEF COMPLAINT QUOTE
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 Pt reports pain to lower back that radiates to legs, pt states unable to weight bear difficulty standing and walking.

## 2020-03-24 NOTE — ED ADULT NURSE NOTE - OBJECTIVE STATEMENT
Pt was struck across lower back by an 800 lb machine at 15:00 yesterday 3/23; Today unable to bear weight or even move without excruciating pain in lower back, hips; no bruising; Hx of DM type 2; 3x bypass 2018; denies numbness or tingling in extremities; bilateral extremities equally strong; pt reports pain is 10 on movement but can tolerate sitting.

## 2020-03-24 NOTE — ED PROVIDER NOTE - OBJECTIVE STATEMENT
59M states yest. while at work, lg. industrial machinery toppled (800lbs) and struck him on R thigh/hip, pt. fell to floor with blow to L side, was able to slide out before being pinned. Pt. had mild pain at that time, was ambulating and drove home comfortably. This a.m. has inc. pain krishna. with weight bearing, L groin, low back and R hip pain. No rad below knees, no weakness/numbness. Pt. comfortable in sitting position without moving,.

## 2020-03-24 NOTE — ED PROVIDER NOTE - PROGRESS NOTE DETAILS
Wes: Improved ROM with pain ctrl, still requires assistance to weight bear. Will do CT imaging despite neg. XR imaging.

## 2020-03-24 NOTE — CONSULT NOTE ADULT - SUBJECTIVE AND OBJECTIVE BOX
59yMale c/o non-radiating sacral pain and left hip pain after having an 800lb weight fall on him at work yesterday. The weight first fell on his left, then rolled across the middle then right side of his lower back/pelvis. He describes the pain in his lower back is aching, without any radiating or electric type pain down their legs. He states that this pain is limiting his ability to ambulate, but he is fine when he is sitting in a chair. He denies bowel/bladder dysfunction. Also has left groin pain when he lifts/moves his left leg Patient denies head hit or LOC. Patient denies numbness or tingling in the BLE. Patient denies any other injuries.    ROS: 10 point review of systems otherwise negative unless noted in HPI    PMH:  Hyperlipidemia  CAD (coronary artery disease)  Umbilical hernia  Diabetes mellitus type 2 in nonobese  Diabetes, type I    PSH:  S/P CABG (coronary artery bypass graft)  Paraumbilical hernia    AH:    Meds: See med rec    T(C): 36.8 (03-24-20 @ 09:41)  HR: 100 (03-24-20 @ 09:41)  BP: 116/70 (03-24-20 @ 09:41)  RR: 18 (03-24-20 @ 09:41)  SpO2: 100% (03-24-20 @ 09:41)  Wt(kg): --                PE  Gen: NAD, alert and oriented  Resp: Unlabored breathing  Spine: no TTP along C/T/L spine      Mild TTP over the sacrum, primarily on the right side (pt notes where he was hit)  RLE: Skin intact, no ecchymosis,        SILT DP/SP/ Julio/Saph/Post Tib       +EHL/FHL/TA/Gastroc,        Knee/ankle painless ROM,        hip: Painless internal/external rotation             painless SLR, Heel strike, log roll  LLE: Skin intact, no ecchymosis,        SILT DP/SP/ Julio/Saph/Post Tib       +EHL/FHL/TA/Gastroc,        Knee/ankle painless ROM,        hip: Painless internal/external rotation             painless SLR, Heel strike, log roll       Groin pain with resisted hip ADD      Secondary:  No TTP over bony landmarks, SILT BL, ROM intact BL, distal pulses palpable.    Imaging:  < from: CT Pelvis Bony Only No Cont (03.24.20 @ 12:54) >  IMPRESSION:  Fractures involving the proximal left superior pubic ramus/acetabular column junction, mid left inferior pubic ramus, and anterior left sacral wing as above. Associated mild posttraumatic stranding in the fat < from: Xray Lumbar Spine AP + Lateral (03.24.20 @ 11:12) >      INTERPRETATION:  Indication: Trauma. 800 pound machine fell on patient.    Technique: AP, lateral, and coned-down lateral views of the lumbar spine were obtained.    Findings: The vertebral body heights are intact. There is no spondylolisthesis. There is mild degenerative disc disease at L4-L5 and L5-S1 with marginal osteophytes also present. No discrete osseous lesion is seen. The pedicles are intact. The sacroiliac joint spaces appear unremarkable.    Impression: No fracture or spondylolisthesis visualized in the lumbar spine.    < end of copied text >  planes adjacent to the fracture sites. Otherwise no focal hematoma collections.    Intact bilateral femoral head neck junctions.    < end of copied text >

## 2020-03-24 NOTE — CONSULT NOTE ADULT - ASSESSMENT
59M with a L LC1 pelvic fx after blunt trauma yesterday    ·	pain control  ·	WBAT with crutches for protected weight baring  ·	Follow Up with Dr. Patricia or Melina in 1-2 weeks  ·	[email or web address] or [phone]    [place] [phone]

## 2020-03-24 NOTE — ED PROVIDER NOTE - PATIENT PORTAL LINK FT
You can access the FollowMyHealth Patient Portal offered by [facility] by registering at the following website: [email or web address]. By joining [facility]’s FollowMyHealth portal, you will also be able to view your health information using other applications (apps) compatible with our system.

## 2020-07-14 ENCOUNTER — EMERGENCY (EMERGENCY)
Facility: HOSPITAL | Age: 60
LOS: 1 days | Discharge: HOME | End: 2020-07-14
Attending: EMERGENCY MEDICINE | Admitting: EMERGENCY MEDICINE
Payer: OTHER MISCELLANEOUS

## 2020-07-14 VITALS
DIASTOLIC BLOOD PRESSURE: 87 MMHG | SYSTOLIC BLOOD PRESSURE: 134 MMHG | OXYGEN SATURATION: 100 % | HEART RATE: 111 BPM | TEMPERATURE: 99 F | RESPIRATION RATE: 16 BRPM

## 2020-07-14 DIAGNOSIS — K42.9 UMBILICAL HERNIA WITHOUT OBSTRUCTION OR GANGRENE: Chronic | ICD-10-CM

## 2020-07-14 DIAGNOSIS — Z95.1 PRESENCE OF AORTOCORONARY BYPASS GRAFT: Chronic | ICD-10-CM

## 2020-07-14 PROCEDURE — 73630 X-RAY EXAM OF FOOT: CPT | Mod: 26,LT

## 2020-07-14 PROCEDURE — 73590 X-RAY EXAM OF LOWER LEG: CPT | Mod: 26,LT

## 2020-07-14 PROCEDURE — 99284 EMERGENCY DEPT VISIT MOD MDM: CPT

## 2020-07-14 PROCEDURE — 73610 X-RAY EXAM OF ANKLE: CPT | Mod: 26,LT

## 2020-07-14 RX ORDER — ACETAMINOPHEN 500 MG
650 TABLET ORAL ONCE
Refills: 0 | Status: COMPLETED | OUTPATIENT
Start: 2020-07-14 | End: 2020-07-14

## 2020-07-14 RX ADMIN — Medication 650 MILLIGRAM(S): at 20:18

## 2020-07-14 NOTE — CONSULT NOTE ADULT - ASSESSMENT
58 y/o M presents to ED w/ Left stable mildly displaced intra-articular fracture of the medial malleolus   - Pt seen and evaluated in ED  - Neurovascular status intact, fracture blister overlying medial malleolus with edema and ecchymosis surrounding the ankle    - X rays reviewed   - Fracture blister on medial malleolus lanced using suture removal kit, dressed with xeroform   - Applied Tavarez compression with posterior splint to left lower extremity   - Dispensed Surgical Shoe  - Dispensed Crutches  - Pt instructed to remain non-weightbearing  - Pt instructed to keep left lower extremity elevated, Ice behind knee, Rest   - Pt expressed verbal understanding   - Pt to f/u as outpatient  - Discussed w/ attending

## 2020-07-14 NOTE — ED PROVIDER NOTE - CLINICAL SUMMARY MEDICAL DECISION MAKING FREE TEXT BOX
John Paul Curiel MD. pt c/o left ankle/foot pain after 200 lb pole/vice  swivel fell on it earlier. ambulatory afterwards. tender to medial mal and lateral malleolus with 4th/5th metatarsal tenderness. will xray, analgesia, reassess.

## 2020-07-14 NOTE — ED PROVIDER NOTE - OBJECTIVE STATEMENT
58 yo M PMHx CAD, s/p cabg, DM2, presents to ED c/o left ankle pain after a 200 pound metal pole and vice  swivel fell onto his left foot earlier this AM. pt was able to continue to walk and work but when he got home, his girlfriend noted bruising and swelling and encouraged pt to come to ED. pt c/o pain and mild ankle swelling to left foot. deneis numbness, tingling, weakness. no other injury or trauma.

## 2020-07-14 NOTE — ED PROVIDER NOTE - ATTENDING CONTRIBUTION TO CARE
agree with resident note    "60 yo M PMHx CAD, s/p cabg, DM2, presents to ED c/o left ankle pain after a 200 pound metal pole and vice  swivel fell onto his left foot earlier this AM. pt was able to continue to walk and work but when he got home, his girlfriend noted bruising and swelling and encouraged pt to come to ED. pt c/o pain and mild ankle swelling to left foot. deneis numbness, tingling, weakness. no other injury or trauma."    PE: well appearing; VSS; CTAB/L; s1 s2 no m/r/g abd soft/NT/ND pelvis stable; left foot deformity on medial aspect; redness; no puncture wound; no crepitus; vasc: 2+ DP pulses; sensation intact    Imp: medial mall fx  podiatry placed in splint, crutch training and f/u with podiatry

## 2020-07-14 NOTE — ED PROVIDER NOTE - NS ED ROS FT
Constitutional: no fevers; no chills  HEENT: no visual changes, no sore throat, no rhinorrhea  CV: no cp; no palpitations  Resp: no sob; no cough  GI: no abd pain, no nausea, no vomiting, no diarrhea, no constipation  : no dysuria, no hematuria  MSK: nleft ankle and foot pain/swelling  skin: no rashes  neuro: no HA, no numbness; no weakness, no tingling  ROS statement: all other ROS negative except as per HPI

## 2020-07-14 NOTE — ED ADULT TRIAGE NOTE - CHIEF COMPLAINT QUOTE
Pt is , says 200lb piece of metal fell on left ankle this AM, c/o swelling and ecchymosis to L foot with blister noted to inner aspect of L ankle. Able to ambulate and move all digits in extremity, denies paresthesias. PMH DMII, CAD, CABG.

## 2020-07-14 NOTE — CONSULT NOTE ADULT - SUBJECTIVE AND OBJECTIVE BOX
Podiatry pager #: [phone] ([place])/ [phone] ([facility])    Patient is a 59y old  Male who presents with a chief complaint of left pain    HPI: 60 y/o M presents to the ED w/ left ankle pain. Pt is an  and while at work a 200 lb metal pole and vice  swivel fell unto his left ankle this morning. Pt was able to continue walking the rest of the say. When he got home, his girlfriend noted bruising and swelling on his left ankle and encouraged him to come to the ED. Pt complains of pain and mild swelling to left ankle. Pt denies numbness/tingling/weakness.      PAST MEDICAL & SURGICAL HISTORY:  Hyperlipidemia  CAD (coronary artery disease)  Umbilical hernia  Diabetes mellitus type 2 in nonobese  S/P CABG (coronary artery bypass graft): October 2018  Paraumbilical hernia      MEDICATIONS  (STANDING):    MEDICATIONS  (PRN):      Allergies    No Known Allergies    Intolerances        VITALS:    Vital Signs Last 24 Hrs  T(C): 37.1 (14 Jul 2020 19:04), Max: 37.1 (14 Jul 2020 19:04)  T(F): 98.7 (14 Jul 2020 19:04), Max: 98.7 (14 Jul 2020 19:04)  HR: 111 (14 Jul 2020 19:04) (111 - 111)  BP: 134/87 (14 Jul 2020 19:04) (134/87 - 134/87)  BP(mean): --  RR: 16 (14 Jul 2020 19:04) (16 - 16)  SpO2: 100% (14 Jul 2020 19:04) (100% - 100%)    LABS:                CAPILLARY BLOOD GLUCOSE              LOWER EXTREMITY PHYSICAL EXAM:    Vascular: DP/PT 2/4 Right, nonpalpable DP/PT Left d/t edema, CFT <3 seconds, 3+ edema surrounding left ankle, B/L, Temperature gradient warm to cool B/L  Neuro: Epicritic sensation intact to the level of knee B/L  Musculoskeletal/Ortho: pain on palpation left medial malleolus  Skin: serous fracture blister overlying  left medial malleolus, ecchymosis present surrounding the ankle and medial aspect of heel, no open wound, no clinical signs of infection       RADIOLOGY & ADDITIONAL STUDIES:  < from: Xray Ankle Complete 3 Views, Left (07.14.20 @ 20:18) >    EXAM:  RAD FOOT MIN 3 VIEWS LT      EXAM:  RAD TIB-FIB LT      EXAM:  RAD ANKLE MIN 3 VIEWS LEFT        PROCEDURE DATE:  Jul 14 2020         INTERPRETATION:  TIME OF EXAM: July 14, 2020 at 8:06 PM    CLINICAL INFORMATION: 200 pound height fell on foot with pain to leg, ankle and foot.    TECHNIQUE:   AP and lateral views of the left leg; AP, oblique and lateral views of the left ankle and foot    INTERPRETATION:     There is a mildly displaced fracture of the medial malleolus with intra-articular extension. No other fractures seen in the ankle, foot or leg. Soft tissue swelling over the lateral malleolus.      COMPARISON:  None available      IMPRESSION:  Displaced intra-articular fracture of the medial malleolus.                  OLIVERIO GARCIA M.D., ATTENDING RADIOLOGIST  This document has been electronically signed. Jul 14 2020  8:35PM        < end of copied text >

## 2020-07-14 NOTE — ED PROVIDER NOTE - PROGRESS NOTE DETAILS
John Paul Curiel MD. xrays noted. podiatry paged and consulted. will come see pt shortly. explained to pt

## 2020-07-14 NOTE — ED PROVIDER NOTE - PHYSICAL EXAMINATION
PHYSICAL EXAM:  GENERAL: non-toxic appearing; in no respiratory distress  HEAD Atraumatic, Normocephalic  NECK: No JVD; FROM  EYES: PERRL, EOMs intact b/l w/out deficits  CHEST/LUNG: CTAB no wheezes/rhonchi/rales  HEART: RRR no murmur/gallops/rubs  ABDOMEN: +BS, soft, NT, ND  EXTREMITIES: No LE edema, +2 radial pulses b/l, +2 DP/PT pulses b/l  MUSCULOSKELETAL: tender to left lateral and medial malleolus, as well as 4th/5th metatarsal; ecchymosis to lower foot  NERVOUS SYSTEM:  A&Ox3, No motor deficits or sensory deficits; CNII-XII intact; no focal neurologic deficits  SKIN:  No new rashes

## 2020-07-14 NOTE — ED PROVIDER NOTE - NSFOLLOWUPINSTRUCTIONS_ED_ALL_ED_FT
Follow up with Dr. Haley Bran podiatry in 1 week.  Number to his office is [phone].  Attempt to remain non weight bearing and use crutches    Return to the ED for worse pain, or any other complaints in which you feel you require immediate care.

## 2020-07-14 NOTE — ED PROVIDER NOTE - PATIENT PORTAL LINK FT
You can access the FollowMyHealth Patient Portal offered by [facility] by registering at the following website: [email or web address]. By joining [facility]’s FollowMyHealth portal, you will also be able to view your health information using other applications (apps) compatible with our system.

## 2020-07-15 LAB — GLUCOSE BLDC GLUCOMTR-MCNC: 334 MG/DL — HIGH (ref 70–99)

## 2021-03-27 NOTE — PHYSICAL THERAPY INITIAL EVALUATION ADULT - GENERAL OBSERVATIONS, REHAB EVAL
Patient
Pt rec'd sitting in bedside chair in CTU in NAD, VSS, +O2 via NC, +chest tube, +a-line, +kraus, +emi, family at b/s, agreeable to PT

## 2021-05-28 NOTE — BRIEF OPERATIVE NOTE - PRE-OP DX
Writer spoke with patient.  Patient verbalized understanding. No further questions at this time. Patient appreciative of services provided.      ASD (atrial septal defect)  10/22/2018    Active  Alma Ornelas  CAD (coronary artery disease)  10/22/2018    Active  Alma Ornelas

## 2022-05-18 NOTE — PRE-OP CHECKLIST - SITE MARKED BY ANESTHESIOLOGIST
Bed alarm on and bed in lowest position. Call light within reach. Frequent rounding on patient. Grippy socks and gait belt with ambulation.  Problem: At Risk for Falls  Goal: # Patient does not fall  Outcome: Outcome Met, Continue evaluating goal progress toward completion  Problem: At Risk for Injury Due to Fall  Goal: # Patient does not fall  Outcome: Outcome Met, Continue evaluating goal progress toward completion     Patient repositions independently in bed frequently. Frequent assessment for incontinence.  Problem: Pressure Injury, Risk for  Goal: # Skin remains intact  Outcome: Outcome Met, Continue evaluating goal progress toward completion        n/a

## 2023-05-28 NOTE — PATIENT PROFILE ADULT - PRIMARY ROLES/RESPONSIBILITIES
[facility]     Progress Note    Patient Name: Kirby Gunter  : 1962  MRN: [id number]  Primary Care Physician:  Chava López MD  Date of admission: 2023  Service date and time: 23 11:52 EDT  Subjective   Subjective     Chief Complaint: shortness of breath   HPI:  Patient Reports   Shortness of breath  Nasal congestion  wheezing      Objective   Objective     Vitals:   Temp:  [97.8 °F (36.6 °C)-98.5 °F (36.9 °C)] 98.5 °F (36.9 °C)  Heart Rate:  [] 104  Resp:  [16-24] 18  BP: ()/(60-89) 123/78  Flow (L/min):  [1-2] 1  Physical Exam    Constitutional: Awake, alert, in mild respiratory distress    Eyes: PERRLA, sclerae anicteric, no conjunctival injection   HENT: NCAT, mucous membranes moist   Neck: Supple, no thyromegaly, no lymphadenopathy, trachea midline   Respiratory: scattered expiratory wheezing    Cardiovascular: RRR, no murmurs, rubs, or gallops, palpable pedal pulses bilaterally   Gastrointestinal: Positive bowel sounds, soft, nontender, nondistended   Musculoskeletal: No bilateral ankle edema, no clubbing or cyanosis to extremities   Psychiatric: Appropriate affect, cooperative   Neurologic: Oriented x 3, strength symmetric in all extremities, Cranial Nerves grossly intact to confrontation, speech clear   Skin: No rashes     Result Review    Result Review:  I have personally reviewed the results from the time of this admission to 2023 11:52 EDT and agree with these findings:  []  Laboratory list / accordion  []  Microbiology  []  Radiology  []  EKG/Telemetry   []  Cardiology/Vascular   []  Pathology  []  Old records  []  Other:  Most notable findings include: chest xray : unremarkable       Assessment & Plan   Assessment / Plan       Active Hospital Problems:  Active Hospital Problems    Diagnosis    • **Hypoxia    • Bronchitis due to human metapneumovirus (hMPV)      Plan:    Hypoxia  -CXR from 2023 negative,      Acute bronchitis due to human  metapneumovirus  Continue breathing treatments   Add Mucinex for chest congestion     Current smoker  -Smoking cessation education  -NicoDerm     Hypertension  -/68  -Resume lisinopril-hydrochlorothiazide and amlodipine  -Hold if SBP <110     Hypothyroidism  -TSH 0.229  -Resume levothyroxine      DVT prophylaxis:  Medical DVT prophylaxis orders are present.    DVT prophylaxis:  Medical DVT prophylaxis orders are present.    CODE STATUS:   Level Of Support Discussed With: Patient  Code Status (Patient has no pulse and is not breathing): CPR (Attempt to Resuscitate)  Medical Interventions (Patient has pulse or is breathing): Full Support    Disposition:  I expect patient to be discharged 1 day .    Jonny Reyes MD   none

## 2023-07-10 NOTE — PHYSICAL THERAPY INITIAL EVALUATION ADULT - NAME OF DISCHARGE PLANNER
Please re-send pharmacy. Scopolamine 1.5mg TD patch 1mg/3days     Patient switched pharmacies     [facility], [address] [phone], [phone] [[id number]]     Last ov 6/12/23    Patient said we sent it to the wrong pharmacy previously. Please re-send.
Sent to requested pharmacy
Left email for PEDRO

## 2023-08-07 NOTE — PHYSICAL THERAPY INITIAL EVALUATION ADULT - PHYSICAL ASSIST/NONPHYSICAL ASSIST: STAND/SIT, REHAB EVAL
Dia Snell called to request a refill on his medication.       Last office visit : 7/3/2023   Next office visit : 10/3/2023     Requested Prescriptions     Pending Prescriptions Disp Refills    furosemide (LASIX) 40 MG tablet [Pharmacy Med Name: FUROSEMIDE 40MG TABLET] 90 tablet 3     Sig: TAKE ONE TABLET DAILY            Cesar Lockwood LPN verbal cues/1 person assist/nonverbal cues (demo/gestures)

## 2023-10-02 NOTE — H&P CARDIOLOGY - PMH
Detail Level: Detailed Quality 130: Documentation Of Current Medications In The Medical Record: Current Medications Documented Quality 137: Melanoma: Continuity Of Care - Recall System: Patient information entered into a recall system that includes: target date for the next exam specified AND a process to follow up with patients regarding missed or unscheduled appointments Diabetes mellitus type 2 in nonobese    Diabetes, type I    Umbilical hernia
